# Patient Record
Sex: MALE | Race: WHITE | NOT HISPANIC OR LATINO | URBAN - METROPOLITAN AREA
[De-identification: names, ages, dates, MRNs, and addresses within clinical notes are randomized per-mention and may not be internally consistent; named-entity substitution may affect disease eponyms.]

---

## 2019-01-22 ENCOUNTER — EMERGENCY (EMERGENCY)
Facility: HOSPITAL | Age: 44
LOS: 1 days | Discharge: OTHER INSTITUTION | End: 2019-01-22
Attending: EMERGENCY MEDICINE
Payer: MEDICARE

## 2019-01-22 VITALS
RESPIRATION RATE: 20 BRPM | OXYGEN SATURATION: 97 % | TEMPERATURE: 98 F | SYSTOLIC BLOOD PRESSURE: 162 MMHG | WEIGHT: 147.05 LBS | HEART RATE: 100 BPM | DIASTOLIC BLOOD PRESSURE: 90 MMHG | HEIGHT: 67 IN

## 2019-01-22 DIAGNOSIS — F20.0 PARANOID SCHIZOPHRENIA: ICD-10-CM

## 2019-01-22 DIAGNOSIS — F12.10 CANNABIS ABUSE, UNCOMPLICATED: ICD-10-CM

## 2019-01-22 DIAGNOSIS — F10.10 ALCOHOL ABUSE, UNCOMPLICATED: ICD-10-CM

## 2019-01-22 DIAGNOSIS — F14.10 COCAINE ABUSE, UNCOMPLICATED: ICD-10-CM

## 2019-01-22 LAB
ALBUMIN SERPL ELPH-MCNC: 4.6 G/DL — SIGNIFICANT CHANGE UP (ref 3.3–5.2)
ALP SERPL-CCNC: 71 U/L — SIGNIFICANT CHANGE UP (ref 40–120)
ALT FLD-CCNC: 50 U/L — HIGH
AMPHET UR-MCNC: NEGATIVE — SIGNIFICANT CHANGE UP
ANION GAP SERPL CALC-SCNC: 13 MMOL/L — SIGNIFICANT CHANGE UP (ref 5–17)
AST SERPL-CCNC: 63 U/L — HIGH
BARBITURATES UR SCN-MCNC: NEGATIVE — SIGNIFICANT CHANGE UP
BASOPHILS # BLD AUTO: 0 K/UL — SIGNIFICANT CHANGE UP (ref 0–0.2)
BASOPHILS NFR BLD AUTO: 0.4 % — SIGNIFICANT CHANGE UP (ref 0–2)
BENZODIAZ UR-MCNC: NEGATIVE — SIGNIFICANT CHANGE UP
BILIRUB SERPL-MCNC: 0.5 MG/DL — SIGNIFICANT CHANGE UP (ref 0.4–2)
BUN SERPL-MCNC: 18 MG/DL — SIGNIFICANT CHANGE UP (ref 8–20)
CALCIUM SERPL-MCNC: 9.7 MG/DL — SIGNIFICANT CHANGE UP (ref 8.6–10.2)
CHLORIDE SERPL-SCNC: 105 MMOL/L — SIGNIFICANT CHANGE UP (ref 98–107)
CO2 SERPL-SCNC: 26 MMOL/L — SIGNIFICANT CHANGE UP (ref 22–29)
COCAINE METAB.OTHER UR-MCNC: NEGATIVE — SIGNIFICANT CHANGE UP
CREAT SERPL-MCNC: 0.95 MG/DL — SIGNIFICANT CHANGE UP (ref 0.5–1.3)
EOSINOPHIL # BLD AUTO: 0 K/UL — SIGNIFICANT CHANGE UP (ref 0–0.5)
EOSINOPHIL NFR BLD AUTO: 0.4 % — SIGNIFICANT CHANGE UP (ref 0–5)
ETHANOL SERPL-MCNC: <10 MG/DL — SIGNIFICANT CHANGE UP
GLUCOSE SERPL-MCNC: 53 MG/DL — LOW (ref 70–115)
HCT VFR BLD CALC: 45.3 % — SIGNIFICANT CHANGE UP (ref 42–52)
HGB BLD-MCNC: 15.4 G/DL — SIGNIFICANT CHANGE UP (ref 14–18)
LYMPHOCYTES # BLD AUTO: 1.3 K/UL — SIGNIFICANT CHANGE UP (ref 1–4.8)
LYMPHOCYTES # BLD AUTO: 13.3 % — LOW (ref 20–55)
MCHC RBC-ENTMCNC: 31.3 PG — HIGH (ref 27–31)
MCHC RBC-ENTMCNC: 34 G/DL — SIGNIFICANT CHANGE UP (ref 32–36)
MCV RBC AUTO: 92.1 FL — SIGNIFICANT CHANGE UP (ref 80–94)
METHADONE UR-MCNC: NEGATIVE — SIGNIFICANT CHANGE UP
MONOCYTES # BLD AUTO: 0.7 K/UL — SIGNIFICANT CHANGE UP (ref 0–0.8)
MONOCYTES NFR BLD AUTO: 6.9 % — SIGNIFICANT CHANGE UP (ref 3–10)
NEUTROPHILS # BLD AUTO: 7.6 K/UL — SIGNIFICANT CHANGE UP (ref 1.8–8)
NEUTROPHILS NFR BLD AUTO: 78.8 % — HIGH (ref 37–73)
OPIATES UR-MCNC: NEGATIVE — SIGNIFICANT CHANGE UP
PCP SPEC-MCNC: SIGNIFICANT CHANGE UP
PCP UR-MCNC: NEGATIVE — SIGNIFICANT CHANGE UP
PLATELET # BLD AUTO: 260 K/UL — SIGNIFICANT CHANGE UP (ref 150–400)
POTASSIUM SERPL-MCNC: 4.4 MMOL/L — SIGNIFICANT CHANGE UP (ref 3.5–5.3)
POTASSIUM SERPL-SCNC: 4.4 MMOL/L — SIGNIFICANT CHANGE UP (ref 3.5–5.3)
PROT SERPL-MCNC: 7.4 G/DL — SIGNIFICANT CHANGE UP (ref 6.6–8.7)
RBC # BLD: 4.92 M/UL — SIGNIFICANT CHANGE UP (ref 4.6–6.2)
RBC # FLD: 13.6 % — SIGNIFICANT CHANGE UP (ref 11–15.6)
SODIUM SERPL-SCNC: 144 MMOL/L — SIGNIFICANT CHANGE UP (ref 135–145)
THC UR QL: POSITIVE
WBC # BLD: 9.6 K/UL — SIGNIFICANT CHANGE UP (ref 4.8–10.8)
WBC # FLD AUTO: 9.6 K/UL — SIGNIFICANT CHANGE UP (ref 4.8–10.8)

## 2019-01-22 PROCEDURE — 99285 EMERGENCY DEPT VISIT HI MDM: CPT

## 2019-01-22 PROCEDURE — 93010 ELECTROCARDIOGRAM REPORT: CPT

## 2019-01-22 PROCEDURE — 90792 PSYCH DIAG EVAL W/MED SRVCS: CPT

## 2019-01-22 RX ADMIN — Medication 2 MILLIGRAM(S): at 13:36

## 2019-01-22 NOTE — ED BEHAVIORAL HEALTH ASSESSMENT NOTE - OTHER PAST PSYCHIATRIC HISTORY (INCLUDE DETAILS REGARDING ONSET, COURSE OF ILLNESS, INPATIENT/OUTPATIENT TREATMENT)
h/o over 40 inpatient psychiatric hospitalizations for schizophrenia.  Course is complicated by substance use. Currently followed by [facility] ACT team.

## 2019-01-22 NOTE — ED ADULT NURSE REASSESSMENT NOTE - CONDITION
unchanged/Pt is loud, Making irrational statements. Tells staff he loves  them. Pt is singing, jumping, and swing his arms. Is concerned about his money stating " I want to find a pot of gold to bring me inner peace" No auditory hallucinations observed. Poor impulse control
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
## 2019-01-22 NOTE — ED PROVIDER NOTE - PROGRESS NOTE DETAILS
As per sign-out from Dr. Blevins, patient is medically cleared and awaiting inpatient psych placement for schizophrenia treatment.

## 2019-01-22 NOTE — ED PROVIDER NOTE - CARE PLAN
Principal Discharge DX:	Paranoid schizophrenia  Secondary Diagnosis:	Cocaine abuse  Secondary Diagnosis:	ETOH abuse

## 2019-01-22 NOTE — ED BEHAVIORAL HEALTH NOTE - BEHAVIORAL HEALTH NOTE
SW Note: pt requires inpatient admission for safety and stability. 9.37 completed and on chart. SW called [facility], [facility], [facility], [facility], [facility], [facility], [facility], [facility], [facility] and [facility] and there are no adult beds available at this time.  SW to follow for transfer when appropriate bed is available. SW contacted by pt mother Judy Sainto H: [phone] C:[phone] (note that other numbers in reg are not correct) pt had resided with her at [address] [place] [address] in [facility] but pt can not return to his housing per mother - too many disruptions, etc.  pt mother feels that pt should attend inpatient rehab upon DC from inpatient psych. SW informed family that it was unclear where pt would go as there are no adult male beds available just now - but mother said that [facility] too far. Prefer [facility], [facility] or, ideally [facility] because she visits another family member over there in [place]. Explained that SW would try to get requested facilities - but not able to promise. pt endorsed to day team.

## 2019-01-22 NOTE — ED BEHAVIORAL HEALTH ASSESSMENT NOTE - RISK ASSESSMENT
high-Given disorganized, erratic behavior, impulsivity, h/o aggression, poor insight and poor judgment.

## 2019-01-22 NOTE — ED ADULT NURSE REASSESSMENT NOTE - CONDITION
Pt sleeping soundly. No distress noted. Dinner held until Pt awakes. Breathing soft and even. No distress noted/unchanged
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
## 2019-01-22 NOTE — ED PROVIDER NOTE - OBJECTIVE STATEMENT
The patient is a 43 year old male presents with history of schizophrenia complaining of hearing voices. No HA, No CP, No SOB, No Abd pain, No motor No sensory loss
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
## 2019-01-22 NOTE — ED PROVIDER NOTE - MEDICAL DECISION MAKING DETAILS
The patient presents with paranoid schizophrenia attack and will be transferred to inpatient psychiatry

## 2019-01-22 NOTE — ED ADULT NURSE REASSESSMENT NOTE - CONDITION
Pt continues to escalate and demands his money. Easily deescalated when spoken to about rules of unit. Pt is extremely paranoid and experiencing flight of ideas . thought process is impaired./unchanged

## 2019-01-22 NOTE — ED BEHAVIORAL HEALTH ASSESSMENT NOTE - DESCRIPTION
Patient was pacing, exhibited pressured speech Patient was pacing, exhibited pressured and disorganized speech, intense affect.  He was not violent or physically aggressive towards staff. Patient was given Ativan 2 mg IM x 1 dose voluntarily secondary to psychomotor agitation. Denies Resides intermittently with mother. Has been housed by [facility] last two weeks Patient was pacing, exhibited pressured and disorganized speech, intense affect.  He was not violent or physically aggressive towards staff. Patient was given Ativan 2 mg IM x 1 dose voluntarily secondary to psychomotor agitation. Patient AST: 63, ALT:50, THC: positive, UDS is otherwise unremarkable, BAL<10.    Vital Signs Last 24 Hrs  T(C): 36.8 (22 Jan 2019 13:07), Max: 36.8 (22 Jan 2019 13:07)  T(F): 98.2 (22 Jan 2019 13:07), Max: 98.2 (22 Jan 2019 13:07)  HR: 100 (22 Jan 2019 13:07) (100 - 100)  BP: 162/90 (22 Jan 2019 13:07) (162/90 - 162/90)  BP(mean): --  RR: 20 (22 Jan 2019 13:07) (20 - 20)  SpO2: 97% (22 Jan 2019 13:07) (97% - 97%)

## 2019-01-22 NOTE — ED ADULT NURSE NOTE - NSIMPLEMENTINTERV_GEN_ALL_ED
Implemented All Universal Safety Interventions:  Gar[place]di to call system. Call bell, personal items and telephone within reach. Instruct patient to call for assistance. Room bathroom lighting operational. Non-slip footwear when patient is off stretcher. Physically safe environment: no spills, clutter or unnecessary equipment. Stretcher in lowest position, wheels locked, appropriate side rails in place.

## 2019-01-23 VITALS
HEART RATE: 97 BPM | DIASTOLIC BLOOD PRESSURE: 85 MMHG | TEMPERATURE: 98 F | RESPIRATION RATE: 18 BRPM | OXYGEN SATURATION: 98 % | SYSTOLIC BLOOD PRESSURE: 135 MMHG

## 2019-01-23 PROCEDURE — 99285 EMERGENCY DEPT VISIT HI MDM: CPT | Mod: 25

## 2019-01-23 PROCEDURE — 85027 COMPLETE CBC AUTOMATED: CPT

## 2019-01-23 PROCEDURE — 93005 ELECTROCARDIOGRAM TRACING: CPT

## 2019-01-23 PROCEDURE — 80307 DRUG TEST PRSMV CHEM ANLYZR: CPT

## 2019-01-23 PROCEDURE — 80053 COMPREHEN METABOLIC PANEL: CPT

## 2019-01-23 PROCEDURE — 36415 COLL VENOUS BLD VENIPUNCTURE: CPT

## 2019-01-23 PROCEDURE — 99213 OFFICE O/P EST LOW 20 MIN: CPT

## 2019-01-23 PROCEDURE — 96372 THER/PROPH/DIAG INJ SC/IM: CPT

## 2019-01-23 RX ORDER — FLUPHENAZINE HYDROCHLORIDE 1 MG/1
5 TABLET, FILM COATED ORAL DAILY
Qty: 0 | Refills: 0 | Status: DISCONTINUED | OUTPATIENT
Start: 2019-01-23 | End: 2019-01-27

## 2019-01-23 RX ADMIN — FLUPHENAZINE HYDROCHLORIDE 5 MILLIGRAM(S): 1 TABLET, FILM COATED ORAL at 10:15

## 2019-01-23 RX ADMIN — Medication 1 MILLIGRAM(S): at 10:15

## 2019-01-23 NOTE — ED ADULT NURSE REASSESSMENT NOTE - COMFORT CARE
meal provided/pt threw in the garbage
meal provided/ambulated to bathroom/plan of care explained/po fluids offered/wait time explained
po fluids offered/plan of care explained/ambulated to bathroom/meal provided/wait time explained/darkened lights
dinner held
meal provided/plan of care explained
plan of care explained
po fluids offered/cookies and gingerale given
po fluids offered/snack provided
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
## 2019-01-23 NOTE — ED BEHAVIORAL HEALTH NOTE - BEHAVIORAL HEALTH NOTE
PROGRESS NOTE: 01-23-19 @ 10:31  	  • Reason for Ongoing Consultation: 	on hold for inpatient bed    ID: [id number] Male with HEALTH ISSUES - PROBLEM Dx:  ETOH abuse  Cannabis abuse  Cocaine abuse  Paranoid schizophrenia          INTERVAL DATA:   • Interval Chief Complaint: Pills destroy the brain  • Interval History: pacing delusional easily agitated no insight paranoid themes     REVIEW OF SYSTEMS:   • Constitutional Symptoms	No complaints  • Eyes	No complaints  • Ears / Nose / Throat / Mouth	No complaints  • Cardiovascular	No complaints  • Respiratory	No complaints  • Gastrointestinal	No complaints  • Genitourinary	No complaints  • Musculoskeletal	No complaints  • Skin	No complaints  • Neurological	No complaints  • Psychiatric (see HPI)	See HPI  • Endocrine	No complaints  • Hematologic / Lymphatic	No complaints  • Allergic / Immunologic	No complaints    REVIEW OF VITALS/LABS/IMAGING/INVESTIGATIONS:   • Vital signs reviewed: Yes  • Vital Signs:	    T(C): 36.4 (01-23-19 @ 07:43), Max: 36.9 (01-22-19 @ 19:15)  HR: 84 (01-23-19 @ 07:43) (80 - 100)  BP: 129/79 (01-23-19 @ 07:43) (108/64 - 162/90)  RR: 18 (01-23-19 @ 07:43) (18 - 20)  SpO2: 98% (01-23-19 @ 07:43) (97% - 99%)    • Available labs reviewed: Yes  • Available Lab Results:                           15.4   9.6   )-----------( 260      ( 22 Jan 2019 13:50 )             45.3     01-22    144  |  105  |  18.0  ----------------------------<  53<L>  4.4   |  26.0  |  0.95    Ca    9.7      22 Jan 2019 13:50    TPro  7.4  /  Alb  4.6  /  TBili  0.5  /  DBili  x   /  AST  63<H>  /  ALT  50<H>  /  AlkPhos  71  01-22    LIVER FUNCTIONS - ( 22 Jan 2019 13:50 )  Alb: 4.6 g/dL / Pro: 7.4 g/dL / ALK PHOS: 71 U/L / ALT: 50 U/L / AST: 63 U/L / GGT: x                   MEDICATIONS:      PRN Medications:  • PRN Medications since last evaluation	Ativan 1 mg for anxiety  • PRN Details	    Current Medications:   fluphenazine 5 milliGRAM(s) Oral daily     Medication Side Effects:  • Medication Side Effects or Adverse Reactions (new or ongoing)	None known    MENTAL STATUS EXAM:   • Level of Consciousness	Alert  • General Appearance	Well developed  • Body Habitus	Well nourished  • Hygiene	poor  • Grooming	poor  • Behavior	Cooperative  • Eye Contact	poor  • Relatedness	poor  • Impulse Control	Normal  • Muscle Tone / Strength	Normal muscle tone/strength  • Abnormal Movements	No abnormal movements  • Gait / Station	Normal gait / station  • Speech Volume	loud  • Speech Rate	Normal  • Speech Spontaneity	Normal  • Speech Articulation	Normal  • Mood	Normal  • Affect Quality	Euthymic  • Affect Range	Full  • Affect Congruence	Congruent  • Thought Process	Linear  • Thought Associations	Normal  • Thought Content	Unremarkable  • Perceptions	No abnormalities  • Oriented to Time	Yes  • Oriented to Place	Yes  • Oriented to Situation	Yes  • Oriented to Person	Yes  • Attention / Concentration	Normal  • Estimated Intelligence	Average  • Recent Memory	Normal  • Remote Memory	Normal  • Fund of Knowledge	Normal  • Language	No abnormalities noted  • Judgment (regarding everyday events)	Fair  • Insight (regarding psychiatric illness)	Fair    SUICIDALITY:   • Suicidality (Interval)	none known    HOMICIDALITY/AGGRESSION:   • Homicidality/Aggression	none known    DIAGNOSIS DSM-V:    Psychiatric Diagnosis (Corresponds to DSM-IV Axis I, II):   HEALTH ISSUES - PROBLEM Dx:  ETOH abuse  Cannabis abuse  Cocaine abuse  Paranoid schizophrenia           Medical Diagnosis (Corresponds to DSM-IV Axis III):  • Axis III	  none    ASSESSMENT OF CURRENT CONDITION:   Summary (include case differential, formulation and patient response to therapy): acute exacerbation of paranoid schizophrenia    Risk Assessment (consider static vs modifiable risk factors and protective factors; comment on level of risk for dangerous behavior): elevated risk of violence    PLAN  prolixin given for psychosis  involuntary admission accepted by [facility]

## 2019-01-23 NOTE — ED BEHAVIORAL HEALTH NOTE - BEHAVIORAL HEALTH NOTE
JULIO Note: Pt was transferred to [facility] for inpt psychiatric tx. 9.37 legals completed. Ambulance arranged with [facility]. The pt gave verbal permission and stated we could notify his mother. Notification made to Abeba JACKSON [phone]. Notified his ACT team, Nav Valdes [phone]. No auth needed, medicare and medicaid.

## 2019-01-23 NOTE — ED ADULT NURSE REASSESSMENT NOTE - STATUS
awaiting transfer, no change
awaiting bed/awaiting transfer, no change
awaiting consult
awaiting consult/awaiting psych consult
awaiting transfer, no change

## 2019-01-23 NOTE — ED ADULT NURSE REASSESSMENT NOTE - GENERAL PATIENT STATE
resting/sleeping/comfortable appearance/cooperative
anxious
anxious/cooperative
comfortable appearance/cooperative/resting/sleeping
comfortable appearance/resting/sleeping
resting/sleeping
smiling/interactive/bizarre behavior/anxious

## 2019-12-01 ENCOUNTER — OUTPATIENT (OUTPATIENT)
Dept: OUTPATIENT SERVICES | Facility: HOSPITAL | Age: 44
LOS: 1 days | End: 2019-12-01
Payer: MEDICARE

## 2019-12-01 PROCEDURE — G9001: CPT

## 2019-12-05 ENCOUNTER — EMERGENCY (EMERGENCY)
Facility: HOSPITAL | Age: 44
LOS: 0 days | Discharge: HOME | End: 2019-12-06
Attending: EMERGENCY MEDICINE
Payer: MEDICARE

## 2019-12-05 VITALS
RESPIRATION RATE: 16 BRPM | DIASTOLIC BLOOD PRESSURE: 93 MMHG | OXYGEN SATURATION: 98 % | HEIGHT: 69 IN | TEMPERATURE: 98 F | SYSTOLIC BLOOD PRESSURE: 129 MMHG | HEART RATE: 98 BPM | WEIGHT: 160.06 LBS

## 2019-12-05 DIAGNOSIS — R10.84 GENERALIZED ABDOMINAL PAIN: ICD-10-CM

## 2019-12-05 DIAGNOSIS — Z79.899 OTHER LONG TERM (CURRENT) DRUG THERAPY: ICD-10-CM

## 2019-12-05 DIAGNOSIS — R05 COUGH: ICD-10-CM

## 2019-12-05 DIAGNOSIS — R10.9 UNSPECIFIED ABDOMINAL PAIN: ICD-10-CM

## 2019-12-05 DIAGNOSIS — F33.9 MAJOR DEPRESSIVE DISORDER, RECURRENT, UNSPECIFIED: ICD-10-CM

## 2019-12-05 DIAGNOSIS — F20.9 SCHIZOPHRENIA, UNSPECIFIED: ICD-10-CM

## 2019-12-05 DIAGNOSIS — R41.82 ALTERED MENTAL STATUS, UNSPECIFIED: ICD-10-CM

## 2019-12-05 PROCEDURE — 80053 COMPREHEN METABOLIC PANEL: CPT

## 2019-12-05 PROCEDURE — 81003 URINALYSIS AUTO W/O SCOPE: CPT

## 2019-12-05 PROCEDURE — 90792 PSYCH DIAG EVAL W/MED SRVCS: CPT

## 2019-12-05 PROCEDURE — 80307 DRUG TEST PRSMV CHEM ANLYZR: CPT

## 2019-12-05 PROCEDURE — 85025 COMPLETE CBC W/AUTO DIFF WBC: CPT

## 2019-12-05 PROCEDURE — 99285 EMERGENCY DEPT VISIT HI MDM: CPT

## 2019-12-05 PROCEDURE — 96374 THER/PROPH/DIAG INJ IV PUSH: CPT

## 2019-12-05 PROCEDURE — 99285 EMERGENCY DEPT VISIT HI MDM: CPT | Mod: 25

## 2019-12-05 PROCEDURE — 36415 COLL VENOUS BLD VENIPUNCTURE: CPT

## 2019-12-05 NOTE — ED ADULT TRIAGE NOTE - CHIEF COMPLAINT QUOTE
Patient comes in with AMS as per PD who called EMS. Patient now complaining of cough and abdominal pain. Patient has hx of schizophrenia as per EMS. Patient A&O x3 at triage. Denies chest pain, sob. Patient comes in with AMS as per PD who called EMS. Patient now complaining of cough and abdominal pain. Patient has hx of schizophrenia as per EMS. Patient appears manic at triage. Denies SI and HI. Patient A&O x3 at triage.

## 2019-12-05 NOTE — ED ADULT NURSE NOTE - CHIEF COMPLAINT QUOTE
Patient comes in with AMS as per PD who called EMS. Patient now complaining of cough and abdominal pain. Patient has hx of schizophrenia as per EMS. Patient A&O x3 at triage. Denies chest pain, sob.

## 2019-12-05 NOTE — ED ADULT NURSE NOTE - OBJECTIVE STATEMENT
pt presents to the ED BIB EMS for AMS from group home. pt unable to recall why EMS was called. reports that nothing is bothering him. pt noted to be disoriented, manic. thoughts arent logical, speaking on a tangent.

## 2019-12-06 VITALS
HEART RATE: 71 BPM | OXYGEN SATURATION: 99 % | SYSTOLIC BLOOD PRESSURE: 124 MMHG | DIASTOLIC BLOOD PRESSURE: 71 MMHG | RESPIRATION RATE: 16 BRPM | TEMPERATURE: 98 F

## 2019-12-06 LAB
ALBUMIN SERPL ELPH-MCNC: 4 G/DL — SIGNIFICANT CHANGE UP (ref 3.3–5)
ALP SERPL-CCNC: 92 U/L — SIGNIFICANT CHANGE UP (ref 40–120)
ALT FLD-CCNC: 34 U/L — SIGNIFICANT CHANGE UP (ref 12–78)
ANION GAP SERPL CALC-SCNC: 8 MMOL/L — SIGNIFICANT CHANGE UP (ref 5–17)
APAP SERPL-MCNC: < 2 UG/ML (ref 10–30)
APPEARANCE UR: CLEAR — SIGNIFICANT CHANGE UP
AST SERPL-CCNC: 36 U/L — SIGNIFICANT CHANGE UP (ref 15–37)
BASOPHILS # BLD AUTO: 0.06 K/UL — SIGNIFICANT CHANGE UP (ref 0–0.2)
BASOPHILS NFR BLD AUTO: 0.8 % — SIGNIFICANT CHANGE UP (ref 0–2)
BILIRUB SERPL-MCNC: 0.4 MG/DL — SIGNIFICANT CHANGE UP (ref 0.2–1.2)
BILIRUB UR-MCNC: NEGATIVE — SIGNIFICANT CHANGE UP
BUN SERPL-MCNC: 17 MG/DL — SIGNIFICANT CHANGE UP (ref 7–23)
CALCIUM SERPL-MCNC: 9.7 MG/DL — SIGNIFICANT CHANGE UP (ref 8.5–10.1)
CHLORIDE SERPL-SCNC: 103 MMOL/L — SIGNIFICANT CHANGE UP (ref 96–108)
CO2 SERPL-SCNC: 26 MMOL/L — SIGNIFICANT CHANGE UP (ref 22–31)
COLOR SPEC: YELLOW — SIGNIFICANT CHANGE UP
CREAT SERPL-MCNC: 1.32 MG/DL — HIGH (ref 0.5–1.3)
DIFF PNL FLD: NEGATIVE — SIGNIFICANT CHANGE UP
EOSINOPHIL # BLD AUTO: 0.07 K/UL — SIGNIFICANT CHANGE UP (ref 0–0.5)
EOSINOPHIL NFR BLD AUTO: 1 % — SIGNIFICANT CHANGE UP (ref 0–6)
ETHANOL SERPL-MCNC: <10 MG/DL — SIGNIFICANT CHANGE UP (ref 0–10)
GLUCOSE SERPL-MCNC: 89 MG/DL — SIGNIFICANT CHANGE UP (ref 70–99)
GLUCOSE UR QL: NEGATIVE MG/DL — SIGNIFICANT CHANGE UP
HCT VFR BLD CALC: 44.9 % — SIGNIFICANT CHANGE UP (ref 39–50)
HGB BLD-MCNC: 15.2 G/DL — SIGNIFICANT CHANGE UP (ref 13–17)
IMM GRANULOCYTES NFR BLD AUTO: 0.3 % — SIGNIFICANT CHANGE UP (ref 0–1.5)
KETONES UR-MCNC: NEGATIVE — SIGNIFICANT CHANGE UP
LEUKOCYTE ESTERASE UR-ACNC: NEGATIVE — SIGNIFICANT CHANGE UP
LYMPHOCYTES # BLD AUTO: 1.28 K/UL — SIGNIFICANT CHANGE UP (ref 1–3.3)
LYMPHOCYTES # BLD AUTO: 17.4 % — SIGNIFICANT CHANGE UP (ref 13–44)
MCHC RBC-ENTMCNC: 30.5 PG — SIGNIFICANT CHANGE UP (ref 27–34)
MCHC RBC-ENTMCNC: 33.9 GM/DL — SIGNIFICANT CHANGE UP (ref 32–36)
MCV RBC AUTO: 90 FL — SIGNIFICANT CHANGE UP (ref 80–100)
MONOCYTES # BLD AUTO: 0.98 K/UL — HIGH (ref 0–0.9)
MONOCYTES NFR BLD AUTO: 13.4 % — SIGNIFICANT CHANGE UP (ref 2–14)
NEUTROPHILS # BLD AUTO: 4.93 K/UL — SIGNIFICANT CHANGE UP (ref 1.8–7.4)
NEUTROPHILS NFR BLD AUTO: 67.1 % — SIGNIFICANT CHANGE UP (ref 43–77)
NITRITE UR-MCNC: NEGATIVE — SIGNIFICANT CHANGE UP
PCP SPEC-MCNC: SIGNIFICANT CHANGE UP
PH UR: 7 — SIGNIFICANT CHANGE UP (ref 5–8)
PLATELET # BLD AUTO: 244 K/UL — SIGNIFICANT CHANGE UP (ref 150–400)
POTASSIUM SERPL-MCNC: 4.3 MMOL/L — SIGNIFICANT CHANGE UP (ref 3.5–5.3)
POTASSIUM SERPL-SCNC: 4.3 MMOL/L — SIGNIFICANT CHANGE UP (ref 3.5–5.3)
PROT SERPL-MCNC: 7.4 GM/DL — SIGNIFICANT CHANGE UP (ref 6–8.3)
PROT UR-MCNC: NEGATIVE MG/DL — SIGNIFICANT CHANGE UP
RBC # BLD: 4.99 M/UL — SIGNIFICANT CHANGE UP (ref 4.2–5.8)
RBC # FLD: 13.2 % — SIGNIFICANT CHANGE UP (ref 10.3–14.5)
SALICYLATES SERPL-MCNC: <1.7 MG/DL — LOW (ref 2.8–20)
SODIUM SERPL-SCNC: 137 MMOL/L — SIGNIFICANT CHANGE UP (ref 135–145)
SP GR SPEC: 1.01 — SIGNIFICANT CHANGE UP (ref 1.01–1.02)
UROBILINOGEN FLD QL: NEGATIVE MG/DL — SIGNIFICANT CHANGE UP
WBC # BLD: 7.34 K/UL — SIGNIFICANT CHANGE UP (ref 3.8–10.5)
WBC # FLD AUTO: 7.34 K/UL — SIGNIFICANT CHANGE UP (ref 3.8–10.5)

## 2019-12-06 PROCEDURE — 90792 PSYCH DIAG EVAL W/MED SRVCS: CPT | Mod: GT

## 2019-12-06 RX ADMIN — Medication 2 MILLIGRAM(S): at 00:47

## 2019-12-06 NOTE — ED BEHAVIORAL HEALTH ASSESSMENT NOTE - CURRENT MEDICATION
Abilify Maintaina 400 mg IM X 4 weeks (next due on 1/24/18) unclear what current medications     previously on abilify, invega, prolixin, risperdal Risperidone 50 RENO

## 2019-12-06 NOTE — ED BEHAVIORAL HEALTH ASSESSMENT NOTE - PSYCHIATRIC ISSUES AND PLAN (INCLUDE STANDING AND PRN MEDICATION)
Treatment team to evaluate and make recommendations unclear what current psychiatric medications are, most recent meds have been RENO, haldol 5 mg and ativan q8h prn for severe agitation

## 2019-12-06 NOTE — ED BEHAVIORAL HEALTH ASSESSMENT NOTE - OTHER
looking for area beds [facility] external billing EMS unclear at this time roommates unable to asseess did not assess slurred some paranoia unable to assess

## 2019-12-06 NOTE — ED PROVIDER NOTE - PATIENT PORTAL LINK FT
You can access the FollowMyHealth Patient Portal offered by [facility] by registering at the following website: [email or web address]. By joining [employer]’s FollowMyHealth portal, you will also be able to view your health information using other applications (apps) compatible with our system.
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
## 2019-12-06 NOTE — ED PROVIDER NOTE - PROGRESS NOTE DETAILS
pt refusing any testing but when offered ativan after IV placement and labs, pt agreed.   concern for possible manipulation of system pt refusing CTs.   denies any complaints.   case d/w Dr Medina (telepsych) and will evaluate pt pt evaluated by telepsych and recommend reeval in AM  I spoke to pt and refusing meds now psych is evaluating at the bedside pt was cleared by psych contractewd to safety has follow up as outpatient   Return to the ER immediately for any worsening symptoms, concerns, chest pain, fevers, shortness of breath, vomiting, abdominal pain, rashes, neck pain, back pain, numbness, paresthesias, pain or any difficulties at all.  Please follow up with your own private physician or our medical clinic at [phone] in the next 2-3 days.  Find a doctor at [phone].  Copies of your tests were provided to you for follow-up.  You must address all your findings with your doctor.
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
## 2019-12-06 NOTE — ED ADULT NURSE REASSESSMENT NOTE - NS ED NURSE REASSESS COMMENT FT1
1:1 initiated @0600. pt not currently agitated or restless. awoken for vitals and continues to have confused thoughts. Pt in no acute distress. will ctm
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
## 2019-12-06 NOTE — ED ADULT NURSE REASSESSMENT NOTE - NS ED NURSE REASSESS COMMENT FT1
pt refusing blood glucose, EKG at this time. pt delusional, stating he does not want to be bothered at this time.

## 2019-12-06 NOTE — ED ADULT NURSE REASSESSMENT NOTE - NS ED NURSE REASSESS COMMENT FT1
pt refused CT. MD mace aware. report received from TWYLA SCHMIDT. pt refused CT despite explanations on necessity. patient does not endorse abd pain/nausea/vomiting at this time. MD mace aware.

## 2019-12-06 NOTE — ED ADULT NURSE REASSESSMENT NOTE - NS ED NURSE REASSESS COMMENT FT1
pt belongings returned to him, medicaid cab in place [facility] in [place], per Dr sarkar request. pt discharged calm cooeprative in no acute distress

## 2019-12-06 NOTE — ED BEHAVIORAL HEALTH ASSESSMENT NOTE - ACTIVATING EVENTS/STRESSORS
Non-compliant or not receiving treatment Non-compliant or not receiving treatment/Pending incarceration or homelessness None known

## 2019-12-06 NOTE — ED PROVIDER NOTE - CARE PLAN
Principal Discharge DX:	Generalized abdominal pain  Secondary Diagnosis:	Recurrent major depressive disorder, remission status unspecified

## 2019-12-06 NOTE — ED BEHAVIORAL HEALTH ASSESSMENT NOTE - DESCRIPTION
Denies Resides intermittently with mother. Has been housed by [facility] last two weeks Records checked/sent to [facility] – with data: [facility] (past BH/medical visits), [facility]    Records checked- no data: [facility], [facility], [facility], [facility], [facility] tab, [facility], [facility], [facility], [facility], [facility], [facility], [facility], [facility], [facility] (medical visits), webcrims, [email or web address], [facility], [facility] ED Visits, google search unable to assess Records checked/sent to [facility] – with data: [facility] (past BH/medical visits), [facility]    Records checked- no data: [facility], [facility], [facility], [facility], [facility] tab, [facility], [facility], [facility], [facility], [facility], [facility], [facility], [facility], [facility] (medical visits), webcrims, nysdoccslookup, [facility]iat[facility], [facility] ED Visits, google search      ED Course: Pt was in ED ~4 hours prior to telepsychiatry consult which was requested at 2:21  and started at 3:12.  Per RN pt. arrived by police/EMS for AMS.  Pt. reluctant to engage but complied with triage process, provided routine labs and urine willingly, allowed gowning and security wanding without incident. Pt. irritable and requested medication for sleep and rest, was given Ativan 2mg IV push at 00:36 with calming effect. Pt. appears manic to and disorganized to RN, but denies psychiatric symptoms and SI/HI AH/VH. Pt’s thoughts reportedly illogical, tangential and disorganized.   Pt’s mood described as anxious, with congruent affect.  Pt. has not had any visitors in ED. As per HPI

## 2019-12-06 NOTE — ED BEHAVIORAL HEALTH ASSESSMENT NOTE - RISK ASSESSMENT
high-Given disorganized, erratic behavior, impulsivity, h/o aggression, poor insight and poor judgment. Unable to determine Suicide Risk LOW RISK Low Acute Suicide Risk

## 2019-12-11 DIAGNOSIS — Z71.89 OTHER SPECIFIED COUNSELING: ICD-10-CM

## 2020-01-19 ENCOUNTER — EMERGENCY (EMERGENCY)
Facility: HOSPITAL | Age: 45
LOS: 1 days | Discharge: HOME | End: 2020-01-19
Attending: EMERGENCY MEDICINE
Payer: MEDICARE

## 2020-01-19 VITALS
WEIGHT: 154.98 LBS | OXYGEN SATURATION: 100 % | SYSTOLIC BLOOD PRESSURE: 136 MMHG | HEART RATE: 115 BPM | DIASTOLIC BLOOD PRESSURE: 88 MMHG | TEMPERATURE: 99 F | RESPIRATION RATE: 18 BRPM | HEIGHT: 68 IN

## 2020-01-19 DIAGNOSIS — R69 ILLNESS, UNSPECIFIED: ICD-10-CM

## 2020-01-19 DIAGNOSIS — F14.10 COCAINE ABUSE, UNCOMPLICATED: ICD-10-CM

## 2020-01-19 DIAGNOSIS — F25.0 SCHIZOAFFECTIVE DISORDER, BIPOLAR TYPE: ICD-10-CM

## 2020-01-19 LAB
ALBUMIN SERPL ELPH-MCNC: 4.6 G/DL — SIGNIFICANT CHANGE UP (ref 3.3–5.2)
ALP SERPL-CCNC: 74 U/L — SIGNIFICANT CHANGE UP (ref 40–120)
ALT FLD-CCNC: 20 U/L — SIGNIFICANT CHANGE UP
AMPHET UR-MCNC: NEGATIVE — SIGNIFICANT CHANGE UP
ANION GAP SERPL CALC-SCNC: 16 MMOL/L — SIGNIFICANT CHANGE UP (ref 5–17)
APPEARANCE UR: CLEAR — SIGNIFICANT CHANGE UP
AST SERPL-CCNC: 40 U/L — HIGH
BACTERIA # UR AUTO: ABNORMAL
BARBITURATES UR SCN-MCNC: NEGATIVE — SIGNIFICANT CHANGE UP
BASOPHILS # BLD AUTO: 0.04 K/UL — SIGNIFICANT CHANGE UP (ref 0–0.2)
BASOPHILS NFR BLD AUTO: 0.3 % — SIGNIFICANT CHANGE UP (ref 0–2)
BENZODIAZ UR-MCNC: NEGATIVE — SIGNIFICANT CHANGE UP
BILIRUB SERPL-MCNC: 0.8 MG/DL — SIGNIFICANT CHANGE UP (ref 0.4–2)
BILIRUB UR-MCNC: NEGATIVE — SIGNIFICANT CHANGE UP
BUN SERPL-MCNC: 22 MG/DL — HIGH (ref 8–20)
CALCIUM SERPL-MCNC: 9.6 MG/DL — SIGNIFICANT CHANGE UP (ref 8.6–10.2)
CHLORIDE SERPL-SCNC: 102 MMOL/L — SIGNIFICANT CHANGE UP (ref 98–107)
CO2 SERPL-SCNC: 21 MMOL/L — LOW (ref 22–29)
COCAINE METAB.OTHER UR-MCNC: POSITIVE
COLOR SPEC: YELLOW — SIGNIFICANT CHANGE UP
CREAT SERPL-MCNC: 1.19 MG/DL — SIGNIFICANT CHANGE UP (ref 0.5–1.3)
DIFF PNL FLD: NEGATIVE — SIGNIFICANT CHANGE UP
EOSINOPHIL # BLD AUTO: 0 K/UL — SIGNIFICANT CHANGE UP (ref 0–0.5)
EOSINOPHIL NFR BLD AUTO: 0 % — SIGNIFICANT CHANGE UP (ref 0–6)
EPI CELLS # UR: SIGNIFICANT CHANGE UP
ETHANOL SERPL-MCNC: <10 MG/DL — SIGNIFICANT CHANGE UP
GLUCOSE SERPL-MCNC: 107 MG/DL — SIGNIFICANT CHANGE UP (ref 70–115)
GLUCOSE UR QL: NEGATIVE MG/DL — SIGNIFICANT CHANGE UP
HCT VFR BLD CALC: 42.7 % — SIGNIFICANT CHANGE UP (ref 39–50)
HGB BLD-MCNC: 14.6 G/DL — SIGNIFICANT CHANGE UP (ref 13–17)
IMM GRANULOCYTES NFR BLD AUTO: 0.3 % — SIGNIFICANT CHANGE UP (ref 0–1.5)
KETONES UR-MCNC: ABNORMAL
LEUKOCYTE ESTERASE UR-ACNC: NEGATIVE — SIGNIFICANT CHANGE UP
LYMPHOCYTES # BLD AUTO: 1.46 K/UL — SIGNIFICANT CHANGE UP (ref 1–3.3)
LYMPHOCYTES # BLD AUTO: 12.5 % — LOW (ref 13–44)
MCHC RBC-ENTMCNC: 30.9 PG — SIGNIFICANT CHANGE UP (ref 27–34)
MCHC RBC-ENTMCNC: 34.2 GM/DL — SIGNIFICANT CHANGE UP (ref 32–36)
MCV RBC AUTO: 90.3 FL — SIGNIFICANT CHANGE UP (ref 80–100)
METHADONE UR-MCNC: NEGATIVE — SIGNIFICANT CHANGE UP
MONOCYTES # BLD AUTO: 0.95 K/UL — HIGH (ref 0–0.9)
MONOCYTES NFR BLD AUTO: 8.1 % — SIGNIFICANT CHANGE UP (ref 2–14)
NEUTROPHILS # BLD AUTO: 9.24 K/UL — HIGH (ref 1.8–7.4)
NEUTROPHILS NFR BLD AUTO: 78.8 % — HIGH (ref 43–77)
NITRITE UR-MCNC: NEGATIVE — SIGNIFICANT CHANGE UP
OPIATES UR-MCNC: NEGATIVE — SIGNIFICANT CHANGE UP
PCP SPEC-MCNC: SIGNIFICANT CHANGE UP
PCP UR-MCNC: NEGATIVE — SIGNIFICANT CHANGE UP
PH UR: 6.5 — SIGNIFICANT CHANGE UP (ref 5–8)
PLATELET # BLD AUTO: 243 K/UL — SIGNIFICANT CHANGE UP (ref 150–400)
POTASSIUM SERPL-MCNC: 4 MMOL/L — SIGNIFICANT CHANGE UP (ref 3.5–5.3)
POTASSIUM SERPL-SCNC: 4 MMOL/L — SIGNIFICANT CHANGE UP (ref 3.5–5.3)
PROT SERPL-MCNC: 7.6 G/DL — SIGNIFICANT CHANGE UP (ref 6.6–8.7)
PROT UR-MCNC: 30 MG/DL
RBC # BLD: 4.73 M/UL — SIGNIFICANT CHANGE UP (ref 4.2–5.8)
RBC # FLD: 13.8 % — SIGNIFICANT CHANGE UP (ref 10.3–14.5)
RBC CASTS # UR COMP ASSIST: SIGNIFICANT CHANGE UP /HPF (ref 0–4)
SODIUM SERPL-SCNC: 139 MMOL/L — SIGNIFICANT CHANGE UP (ref 135–145)
SP GR SPEC: 1.02 — SIGNIFICANT CHANGE UP (ref 1.01–1.02)
THC UR QL: POSITIVE
UROBILINOGEN FLD QL: 1 MG/DL
WBC # BLD: 11.72 K/UL — HIGH (ref 3.8–10.5)
WBC # FLD AUTO: 11.72 K/UL — HIGH (ref 3.8–10.5)
WBC UR QL: SIGNIFICANT CHANGE UP

## 2020-01-19 PROCEDURE — 93010 ELECTROCARDIOGRAM REPORT: CPT

## 2020-01-19 PROCEDURE — 99284 EMERGENCY DEPT VISIT MOD MDM: CPT

## 2020-01-19 PROCEDURE — 90792 PSYCH DIAG EVAL W/MED SRVCS: CPT

## 2020-01-19 NOTE — ED PROVIDER NOTE - PATIENT PORTAL LINK FT
You can access the FollowMyHealth Patient Portal offered by [facility] by registering at the following website: [email or web address]. By joining [facility]’s FollowMyHealth portal, you will also be able to view your health information using other applications (apps) compatible with our system.

## 2020-01-19 NOTE — ED BEHAVIORAL HEALTH ASSESSMENT NOTE - PRIMARY DX
Schizoaffective disorder, bipolar type without good prognostic features Deferred condition on axis II

## 2020-01-19 NOTE — ED BEHAVIORAL HEALTH ASSESSMENT NOTE - OTHER
patient lives in a house but will not identify where that is or who he lives with residents not cooperative pending bed availability

## 2020-01-19 NOTE — ED ADULT NURSE NOTE - NSIMPLEMENTINTERV_GEN_ALL_ED
Implemented All Fall Risk Interventions:  [place] to call system. Call bell, personal items and telephone within reach. Instruct patient to call for assistance. Room bathroom lighting operational. Non-slip footwear when patient is off stretcher. Physically safe environment: no spills, clutter or unnecessary equipment. Stretcher in lowest position, wheels locked, appropriate side rails in place. Provide visual cue, wrist band, yellow gown, etc. Monitor gait and stability. Monitor for mental status changes and reorient to person, place, and time. Review medications for side effects contributing to fall risk. Reinforce activity limits and safety measures with patient and family. Implemented All Universal Safety Interventions:  [place] to call system. Call bell, personal items and telephone within reach. Instruct patient to call for assistance. Room bathroom lighting operational. Non-slip footwear when patient is off stretcher. Physically safe environment: no spills, clutter or unnecessary equipment. Stretcher in lowest position, wheels locked, appropriate side rails in place.

## 2020-01-19 NOTE — ED BEHAVIORAL HEALTH NOTE - BEHAVIORAL HEALTH NOTE
JULIONote: pt seen by psych NP(John) pt found to benefit from inpatient hospitalization .Pt was d/c from [facility] recently (no beds available today ), no beds available at [facility] either.  Pt's family requesting to be informed about accepting hospital (no [facility]). SW will continue efforts to find bed.

## 2020-01-19 NOTE — ED BEHAVIORAL HEALTH ASSESSMENT NOTE - SUMMARY
44 year old male recently discharged from [facility] 5 days ago and with chronic hx of schizoaffective d/o, substance abuse and medication non-compliance  brought to the ER after telling his mother he would kill himself. He remains psychotic, with BRAYAN, continued passive SI "if I have to be on psychiatric medications". Easily agitated, unhappy with his life.

## 2020-01-19 NOTE — ED BEHAVIORAL HEALTH ASSESSMENT NOTE - DESCRIPTION
Admit to psychiatry to any bed after medical clearance . none known Patient lives in supportive housing

## 2020-01-19 NOTE — ED ADULT TRIAGE NOTE - CHIEF COMPLAINT QUOTE
Patient arrived via EMS, awake alert, disoriented, bizarre behavior, bizarre statements, breathing unlabored.  Patient admits to doing crack cocaine, and drinking alcohol.   Patient had 6 ativan tablets found in pants pocket.  Patient admits to SI and " self sacrifice"  Patient re-directed in times.  patient undressed placed in yellow gown.

## 2020-01-19 NOTE — ED PROVIDER NOTE - PROGRESS NOTE DETAILS
As per sign-out from Dr. Ceron, patient whom is medically cleared but requires inpatient psych admission for management of schizophrenia. Patient has remained stable. Still awaiting inpatient psych bed placement. Patient signed out to Dr. Blevins. Patient stable and received in sign-out from Dr. Blevins pending placement. Patient remained stable during the day. Placement still pending. cleared by dr javed: will follow up with ACT team. No acute medical conditions.  Will discharge.

## 2020-01-19 NOTE — ED PROVIDER NOTE - OBJECTIVE STATEMENT
45 yo male with SI. Pt has dx schizophrenia, dx age 17 yo. Non compliant on meds. States he is SI and "Will sacrifice himself before dying from drugs" Did use crack last night. Just d/c after a month hospitalization from [facility]. Went to his mother's today and relative found him to be "hyped up" as per mother. Pt admitted crack use to family. Also admitted use during ER interview.  No medical complaints.  Med hx neg  Psych hx Scizophrenia  Soc Hx drug use
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
## 2020-01-19 NOTE — ED BEHAVIORAL HEALTH ASSESSMENT NOTE - RISK ASSESSMENT
Risk factors include chronic mental illness, substance abuse which is active, his persistent refusal to take medications when at home, impulsivity problem. and when he begins to talk about killing himself.  Protective factors is his mothers continued support and that he goes to see. Moderate Acute Suicide Risk

## 2020-01-19 NOTE — ED PROVIDER NOTE - CONSTITUTIONAL, MLM
normal... Well appearing, awake, alert, oriented to person, place, time/situation and in + emotional  distress.

## 2020-01-19 NOTE — ED BEHAVIORAL HEALTH ASSESSMENT NOTE - CURRENT MEDICATION
Chago Palafox : Olanzapine 10mg po in am. Olanzapine 15mg po hs. Melatonin 3 mg  2 tabs po hs, Lorazepam 2mg po daily prn anxiety, Abilify Maintena 400mg once monthly IM - due on 1-20-20  Cyanocobalamin 50mcg po daily

## 2020-01-19 NOTE — ED PROVIDER NOTE - CLINICAL SUMMARY MEDICAL DECISION MAKING FREE TEXT BOX
pt is known schizophrenic. Recent hospitalization at [facility]. Presents after using crack. Suicidality+ flight of ideas. Exaggerated speech.   ekg lab ua tox Psych eval pt is known schizophrenic. Recent hospitalization at [facility]. Presents after using crack. Suicidality+ flight of ideas. Exaggerated speech.   ekg lab ua tox Psych eval  pt to be admited inpatient, bed pending
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
## 2020-01-20 PROCEDURE — 99213 OFFICE O/P EST LOW 20 MIN: CPT

## 2020-01-20 NOTE — CHART NOTE - NSCHARTNOTEFT_GEN_A_CORE
JULIO placed call to [facility] and spoke with employee, Azalea, whom reported patient is accepted to [facility]. Azalea requested SW fax EKG to Fax: [phone]. Fax completed. JULIO continues to follow.

## 2020-01-20 NOTE — CHART NOTE - NSCHARTNOTEFT_GEN_A_CORE
JULIO received call from [facility] employee, Azalea, whom reported patient cannot come to the facility. Patient does not have any medicare days left and [facility] does not accept Medicaid. JULIO placed call to [facility]. There are no available beds at the facility. JULIO placed call to patient's mother, Deisy ([phone]), to make aware patient is no longer accepted to [facility]. JULIO explained worker will continue to look for placement on behalf of patient.

## 2020-01-20 NOTE — ED BEHAVIORAL HEALTH NOTE - BEHAVIORAL HEALTH NOTE
PROGRESS NOTE: 20 @ 11:56  	  • Reason for Ongoing Consultation: 	Disorganized and suicidal ideation     ID: [id number] Male with HEALTH ISSUES - PROBLEM Dx:  Deferred condition on axis II  Cocaine abuse  Schizoaffective disorder, bipolar type without good prognostic features          INTERVAL DATA:   • Interval Chief Complaint:  "I know I am not right"  • Interval History:  Patient admits to feeling "delusional and self sacrificial". Patient was labile. He reports medications make him feel violent.  He reports he wants to die if he takes medications.  He states "medications make me talk about death and dying".   Patient speech was rapid and difficult ot interrupt.        REVIEW OF SYSTEMS:   • Constitutional Symptoms	No complaints  • Eyes	No complaints  • Ears / Nose / Throat / Mouth	No complaints  • Cardiovascular	No complaints  • Respiratory	No complaints  • Gastrointestinal	No complaints  • Genitourinary	No complaints  • Musculoskeletal	No complaints  • Skin	No complaints  • Neurological	No complaints  • Psychiatric (see HPI)	See HPI  • Endocrine	No complaints  • Hematologic / Lymphatic	No complaints  • Allergic / Immunologic	No complaints    REVIEW OF VITALS/LABS/IMAGING/INVESTIGATIONS:   • Vital signs reviewed: Yes  • Vital Signs:	    T(C): 37 (20 @ 23:43), Max: 37 (20 @ 15:20)  HR: 83 (20 @ 23:43) (83 - 93)  BP: 105/68 (20 @ 23:43) (105/68 - 134/88)  RR: 19 (20 @ 23:43) (18 - 20)  SpO2: 93% (20 @ 23:43) (93% - 98%)    • Available labs reviewed: Yes  • Available Lab Results:                           14.6   11.72 )-----------( 243      ( 2020 11:20 )             42.7         139  |  102  |  22.0<H>  ----------------------------<  107  4.0   |  21.0<L>  |  1.19    Ca    9.6      2020 11:20    TPro  7.6  /  Alb  4.6  /  TBili  0.8  /  DBili  x   /  AST  40<H>  /  ALT  20  /  AlkPhos  74      LIVER FUNCTIONS - ( 2020 11:20 )  Alb: 4.6 g/dL / Pro: 7.6 g/dL / ALK PHOS: 74 U/L / ALT: 20 U/L / AST: 40 U/L / GGT: x             Urinalysis Basic - ( 2020 12:45 )    Color: Yellow / Appearance: Clear / S.020 / pH: x  Gluc: x / Ketone: Trace  / Bili: Negative / Urobili: 1 mg/dL   Blood: x / Protein: 30 mg/dL / Nitrite: Negative   Leuk Esterase: Negative / RBC: 0-2 /HPF / WBC 0-2   Sq Epi: x / Non Sq Epi: Few / Bacteria: Few          MEDICATIONS:      PRN Medications:  • PRN Medications since last evaluation	  • PRN Details	    Current Medications:      Medication Side Effects:  • Medication Side Effects or Adverse Reactions (new or ongoing)	None known    SUICIDALITY:   • Suicidality (Interval)	reports suicidal ideation yesterday, vague about current ideation    HOMICIDALITY/AGGRESSION:   • Homicidality/Aggression	denies    MENTAL STATUS EXAM:   · General Appearance	Well developed  · Body Habitus	Well nourished  · Hygiene	Fair  · Grooming	Fair  · Behavior	guarded   · Eye Contact	Fair  · Relatedness	Fair  · Impulse Control	Normal  · Muscle Tone / Strength	Normal muscle tone/strength  · Abnormal Movements	No abnormal movements  · Gait / Station	Normal gait / station  · Speech Volume	Loud  · Speech Rate	Fast, difficult to interrupt  · Speech Spontaneity	Normal  · Speech Articulation	Normal  · Reported mood	Anxious  Irritable  · Observed mood	Irritable  · Affect Range	Labile  · Affect Congruence	Congruent  · Thought Process	Disorganized  · Thought Associations	Loose  · Thought Content	Unremarkable  · Perceptions	No abnormalities  · Oriented to Time	No  · Oriented to Place	Yes  · Oriented to Situation	Yes  · Oriented to Person	Yes  · Attention / Concentration	Impaired  · Estimated Intelligence	Average  · Recent Memory	Other  · Other	not cooperative  · Remote Memory	Other  · Other	not cooperative  · Fund of Knowledge	Normal  · Language	No abnormalities noted  · Judgment (regarding everyday events)	Poor  · Insight (regarding psychiatric illness)	Poor    FORMULATION:    Formulation:  · Summary (brief)	44 year old male recently discharged from [facility] 5 days ago and with chronic hx of schizoaffective d/o, substance abuse and medication non-compliance  brought to the ER after telling his mother he would kill himself. He remains psychotic, with BRAYAN, continued passive SI "if I have to be on psychiatric medications". Easily agitated, unhappy with his life, unreliable historian   · Differential	Schizophrenia  · Risk Assessment	Moderate Acute Suicide Risk  · Elevated Chronic Risk	Yes  · Rationale/Summary (include warning signs, risk indicators, protective factors, access to lethal means, collateral sources used, assessment data, rationale):	Risk factors include chronic mental illness, substance abuse which is active, his persistent refusal to take medications when at home, impulsivity problem. and when he begins to talk about killing himself.  Protective factors is his mothers continued support and that he goes to see.    AXIS:    Axis I:  Primary Dx Schizoaffective disorder, bipolar type without good prognostic features F25.0. Secondary Dx 1 Cocaine abuse F14.10.     Axis II:  Primary Dx Deferred condition on axis II R69.     Axis III:  · Axis III	see PE     Axis IV:  · Axis IV	Problem related to social environment     Axis V:  · GAF	30    PLAN:   Patient requires inpatient psychiatric hospitalization  Will restart Olanzapine  5mg twice daily   Haldol PRN

## 2020-01-21 PROCEDURE — 99212 OFFICE O/P EST SF 10 MIN: CPT

## 2020-01-21 RX ORDER — OLANZAPINE 15 MG/1
10 TABLET, FILM COATED ORAL DAILY
Refills: 0 | Status: DISCONTINUED | OUTPATIENT
Start: 2020-01-21 | End: 2020-02-05

## 2020-01-21 RX ADMIN — Medication 1 MILLIGRAM(S): at 09:48

## 2020-01-21 NOTE — ED BEHAVIORAL HEALTH NOTE - BEHAVIORAL HEALTH NOTE
PROGRESS NOTE: 20 @ 10:24  	  • Reason for Ongoing Consultation: 	    ID: 43 yo Male with HEALTH ISSUES - PROBLEM Dx:    Cocaine abuse  Schizoaffective disorder, bipolar type without good prognostic features          INTERVAL DATA:   • Interval Chief Complaint: "Im not well and want to stop using drugs"  • Interval History: 43 yo male with PMHx of polysubstance abuse and schizoaffective disorder was BIBA, his mother called the police. Pt was recently discharged from [facility] 6 days ago and states he was staying at house in [place] but left because he felt like people were watching him. On Saturday night around 7:30 pm pt states he left and went to the streets to smoke crack and CBD. On  morning he went to his mothers house and she called the police because he was high. Pt denies suicidal/homicidal ideations and states " my life means more to me than drugs." Pt states he sees TOM Caro at [facility] once a month, seen last week. Patient would like to go to a 28 day program for drug abuse. Admits to not taking his psychiatric medications.    Spoke with [facility] Team, patient is on Assisted Outpatient Treatment. He has not been compliant despite a court order.    REVIEW OF SYSTEMS:   • Constitutional Symptoms	No complaints  • Eyes	No complaints  • Ears / Nose / Throat / Mouth	No complaints  • Cardiovascular	No complaints  • Respiratory	No complaints  • Gastrointestinal	No complaints  • Genitourinary	No complaints  • Musculoskeletal	No complaints  • Skin	No complaints  • Neurological	No complaints  • Psychiatric (see HPI)	See HPI  • Endocrine	No complaints  • Hematologic / Lymphatic	No complaints  • Allergic / Immunologic	No complaints    REVIEW OF VITALS/LABS/IMAGING/INVESTIGATIONS:   • Vital signs reviewed: Yes  • Vital Signs:	    T(C): 37 (20 @ 07:12), Max: 37 (20 @ 07:12)  HR: 73 (20 @ 07:12) (68 - 74)  BP: 110/72 (20 @ 07:12) (105/71 - 133/83)  RR: 18 (20 @ 07:12) (18 - 19)  SpO2: 96% (20 @ 07:12) (93% - 96%)    • Available labs reviewed: Yes  • Available Lab Results:                           14.6   11.72 )-----------( 243      ( 2020 11:20 )             42.7     -19    139  |  102  |  22.0<H>  ----------------------------<  107  4.0   |  21.0<L>  |  1.19    Ca    9.6      2020 11:20    TPro  7.6  /  Alb  4.6  /  TBili  0.8  /  DBili  x   /  AST  40<H>  /  ALT  20  /  AlkPhos  74      LIVER FUNCTIONS - ( 2020 11:20 )  Alb: 4.6 g/dL / Pro: 7.6 g/dL / ALK PHOS: 74 U/L / ALT: 20 U/L / AST: 40 U/L / GGT: x             Urinalysis Basic - ( 2020 12:45 )    Color: Yellow / Appearance: Clear / S.020 / pH: x  Gluc: x / Ketone: Trace  / Bili: Negative / Urobili: 1 mg/dL   Blood: x / Protein: 30 mg/dL / Nitrite: Negative   Leuk Esterase: Negative / RBC: 0-2 /HPF / WBC 0-2   Sq Epi: x / Non Sq Epi: Few / Bacteria: Few          MEDICATIONS:      PRN Medications:  • PRN Medications since last evaluation	  • PRN Details	    Current Medications:   LORazepam     Tablet 1 milliGRAM(s) Oral every 6 hours PRN  OLANZapine 10 milliGRAM(s) Oral daily     Medication Side Effects:  • Medication Side Effects or Adverse Reactions (new or ongoing)	None known    MENTAL STATUS EXAM:   • Level of Consciousness	Alert  • General Appearance	Well developed  • Body Habitus	Well nourished  • Hygiene	Fair  • Grooming	Fair  • Behavior	Cooperative  • Eye Contact	Good  • Relatedness	Good  • Impulse Control	Normal  • Muscle Tone / Strength	Normal muscle tone/strength  • Abnormal Movements	restless   • Gait / Station	Normal gait / station  • Speech Volume	Normal  • Speech Rate	Fast  • Speech Spontaneity	Normal  • Speech Articulation	Normal  • Mood	anxious  • Affect Quality	nervous  • Affect Range	Full  • Affect Congruence	Congruent  • Thought Process	              Disorganized  • Thought Associations	Normal  • Thought Content	              Unremarkable  • Perceptions	No abnormalities  • Oriented to Time	Yes  • Oriented to Place	Yes  • Oriented to Situation	Yes  • Oriented to Person	Yes  • Attention / Concentration	Normal  • Estimated Intelligence	Average  • Recent Memory	Normal  • Remote Memory	Normal  • Fund of Knowledge	Normal  • Language	No abnormalities noted  • Judgment (regarding everyday events)	Fair  • Insight (regarding psychiatric illness)	Fair  SUICIDALITY:   • Suicidality (Interval)	none known  HOMICIDALITY/AGGRESSION:   • Homicidality/Aggression	none known  DIAGNOSIS DSM-V:    Psychiatric Diagnosis (Corresponds to DSM-IV Axis I, II):   HEALTH ISSUES - PROBLEM Dx:  Cocaine abuse  Schizoaffective disorder, bipolar type without good prognostic features   Medical Diagnosis (Corresponds to DSM-IV Axis III):  • Axis III	  none  ASSESSMENT OF CURRENT CONDITION:   Summary (include case differential, formulation and patient response to therapy):  43 yo male with PMHx of polysubstance abuse and schizoaffective disorder was BIBA, his mother called the police. Pt was discharged from [facility] 6 days ago, is non compliant on his psychiatric medications, is labile  and is using drugs.  Although  today Pt denies suicidal/homicidal ideations and states " my life means more to me than drugs." Yesterday he endorsed  both violent and self injurious urges. Pt states he sees T[facility] Caro at [facility] once a month, seen last week. Patient would like to go to a 28 day program for drug abuse however is refusing meds despite AOT order his psych meds..     Risk Assessment (consider static vs modifiable risk factors and protective factors; comment on level of risk for dangerous behavior): Risk Factors: Patient does not have a place to live, chronic mental illness, active  substance abuse. Protective Factors: goes to see mother, expresses wish to stop taking drugs.    PLAN:  Admit for Inpatient Treatment  9.37 application

## 2020-01-21 NOTE — ED BEHAVIORAL HEALTH NOTE - BEHAVIORAL HEALTH NOTE
SW Note: Plan is to transfer pt for inpt psychiatric care. Pt is followed by [facility] team [phone] and is under an AOT order. Pt has medicare ( exhausted days) and straight medicaid. Due to ins unable to refer to [facility] and [facility]. Called the following and no beds available at this time: [facility], [facility], [facility], [facility], [facility], [facility], [place], [facility] and [place]. [facility] would not give bed census, chart faxed, msg left for intake dept at [facility]. SW to follow

## 2020-01-22 RX ORDER — ARIPIPRAZOLE 15 MG/1
400 TABLET ORAL ONCE
Refills: 0 | Status: COMPLETED | OUTPATIENT
Start: 2020-01-22 | End: 2020-01-22

## 2020-01-22 RX ADMIN — Medication 1 MILLIGRAM(S): at 09:18

## 2020-01-22 RX ADMIN — ARIPIPRAZOLE 400 MILLIGRAM(S): 15 TABLET ORAL at 10:53

## 2020-01-22 NOTE — ED BEHAVIORAL HEALTH NOTE - BEHAVIORAL HEALTH NOTE
PROGRESS NOTE: 01-22-20 @ 13:52  	  • Reason for Ongoing Consultation: 	    ID: 45yo Male with HEALTH ISSUES - PROBLEM Dx:  Cocaine abuse  Schizoaffective disorder, bipolar type without good prognostic features      INTERVAL DATA:   • Interval Chief Complaint:   • Interval History: Pt is lying in bed calmly answering questions. Refusing Olanzapine, accepted Abilify 400 mg IM. Patient states he feels good and is wondering where he will be transferred. Pt still wants to quit drugs and denies suicidal ideations / homicidal ideations and violent thoughts. He says the Abilify is helping and feels better than yesterday.      REVIEW OF SYSTEMS:   • Constitutional Symptoms	No complaints  • Eyes	No complaints  • Ears / Nose / Throat / Mouth	No complaints  • Cardiovascular	No complaints  • Respiratory	No complaints  • Gastrointestinal	No complaints  • Genitourinary	No complaints  • Musculoskeletal	No complaints  • Skin	No complaints  • Neurological	No complaints  • Psychiatric (see HPI)	See HPI  • Endocrine	No complaints  • Hematologic / Lymphatic	No complaints  • Allergic / Immunologic	No complaints    REVIEW OF VITALS/LABS/IMAGING/INVESTIGATIONS:   • Vital signs reviewed: Yes  • Vital Signs:	    T(C): 36.6 (01-22-20 @ 11:24), Max: 37.2 (01-21-20 @ 16:03)  HR: 67 (01-22-20 @ 11:24) (66 - 85)  BP: 113/75 (01-22-20 @ 11:24) (113/73 - 127/69)  RR: 18 (01-22-20 @ 11:24) (17 - 19)  SpO2: 98% (01-22-20 @ 11:24) (93% - 98%)    • Available labs reviewed: Yes  • Available Lab Results:       MEDICATIONS:      PRN Medications:  • PRN Medications since last evaluation	  • PRN Details	    Current Medications:   LORazepam     Tablet 1 milliGRAM(s) Oral every 6 hours PRN  OLANZapine 10 milliGRAM(s) Oral daily     Medication Side Effects:  • Medication Side Effects or Adverse Reactions (new or ongoing)	None known    MENTAL STATUS EXAM:   • Level of Consciousness	Alert  • General Appearance	Well developed  • Body Habitus	Well nourished  • Hygiene	Good  • Grooming	Good  • Behavior	Cooperative  • Eye Contact	Good  • Relatedness	Good  • Impulse Control	Normal  • Muscle Tone / Strength	Normal muscle tone/strength  • Abnormal Movements	No abnormal movements  • Gait / Station	Normal gait / station  • Speech Volume	Normal  • Speech Rate	Normal  • Speech Spontaneity	Normal  • Speech Articulation	Normal  • Mood	                            Normal  • Affect Quality	              Euthymic  • Affect Range	              Full  • Affect Congruence	Congruent  • Thought Process	              Linear  • Thought Associations	Normal  • Thought Content	              Unremarkable  • Perceptions	              No abnormalities  • Oriented to Time 	Yes  • Oriented to Place 	Yes  • Oriented to Situation	Yes  • Oriented to Person	Yes  • Attention / Concentration	Normal  • Estimated Intelligence	Average  • Recent Memory	Normal  • Remote Memory	Normal  • Fund of Knowledge	Normal  • Language	No abnormalities noted  • Judgment (regarding everyday events)	Fair  • Insight (regarding psychiatric illness)	Fair    SUICIDALITY:   • Suicidality (Interval)	none known    HOMICIDALITY/AGGRESSION:   • Homicidality/Aggression	none known    DIAGNOSIS DSM-V:    Psychiatric Diagnosis (Corresponds to DSM-IV Axis I, II):   HEALTH ISSUES - PROBLEM Dx:    Cocaine abuse  Schizoaffective disorder, bipolar type without good prognostic features           Medical Diagnosis (Corresponds to DSM-IV Axis III):  • Axis III	none      ASSESSMENT OF CURRENT CONDITION:   Summary (include case differential, formulation and patient response to therapy): Pt is lying in bed calmly answering questions. Refusing Olanzapine, accepted Abilify 400 mg IM. Patient states he feels better than yesterday after taking his medication. Pt still wants to quit drugs and denies suicidal ideations / homicidal ideations and violent thoughts.        Risk Assessment (consider static vs modifiable risk factors and protective factors; comment on level of risk for dangerous behavior): Risk Factors: Patient does not have a place to live, chronic mental illness, active  substance abuse. Protective Factors: goes to see mother, expresses wish to stop taking drugs.      PLAN  monitor for response  to Abilify  hold for re-assessmsnet in AM    delusions and dangerous symptoms appear to be abating

## 2020-01-22 NOTE — ED BEHAVIORAL HEALTH NOTE - BEHAVIORAL HEALTH NOTE
SW Note: PLan is to transfer pt for inpt psychiatric care. Pt does not have medicare days left and placement would be under his medicaid policy. No available beds for transfer today.  provider and  staff aware. Following facilities were called: [facility], [facility], [place], [facility] ( [facility] had over 20 in their ED) , , [facility], [facility], [facility], [facility], [facility] and [facility]. Due to ins issue unable to refer to [facility] and [facility].

## 2020-01-23 VITALS
OXYGEN SATURATION: 96 % | SYSTOLIC BLOOD PRESSURE: 117 MMHG | TEMPERATURE: 98 F | RESPIRATION RATE: 20 BRPM | DIASTOLIC BLOOD PRESSURE: 71 MMHG | HEART RATE: 84 BPM

## 2020-01-23 PROCEDURE — 85027 COMPLETE CBC AUTOMATED: CPT

## 2020-01-23 PROCEDURE — 80053 COMPREHEN METABOLIC PANEL: CPT

## 2020-01-23 PROCEDURE — 80307 DRUG TEST PRSMV CHEM ANLYZR: CPT

## 2020-01-23 PROCEDURE — 96372 THER/PROPH/DIAG INJ SC/IM: CPT

## 2020-01-23 PROCEDURE — 99213 OFFICE O/P EST LOW 20 MIN: CPT

## 2020-01-23 PROCEDURE — 36415 COLL VENOUS BLD VENIPUNCTURE: CPT

## 2020-01-23 PROCEDURE — 93005 ELECTROCARDIOGRAM TRACING: CPT

## 2020-01-23 PROCEDURE — 81001 URINALYSIS AUTO W/SCOPE: CPT

## 2020-01-23 PROCEDURE — 99284 EMERGENCY DEPT VISIT MOD MDM: CPT | Mod: 25

## 2020-01-23 RX ADMIN — Medication 1 MILLIGRAM(S): at 08:36

## 2020-01-23 NOTE — ED BEHAVIORAL HEALTH NOTE - BEHAVIORAL HEALTH NOTE
SW Note: pt has been cleared for discharge by  provider. Pt given IM medication yesterday. Called and spoke with Nina at [facility] team, [phone]. They will follow up with pt in the community. Pt is also under an AOT order. Pt is currently homeless and the ACT does not have any housing options at this time. Had discussed possible referral to [facility]. pt has been there before and has had behavioral issues and kicked out. The pt declined a referral to [facility] and requested bus tickets to go to [facility] for shelter placement. pt signed consent form and  D/C note and meds given from 1/19 thru today faxed to [facility] for continued care, Fax# [phone].

## 2020-01-23 NOTE — ED ADULT NURSE REASSESSMENT NOTE - GENERAL PATIENT STATE
resting/sleeping
resting/sleeping
comfortable appearance
cooperative/comfortable appearance
resting/sleeping
resting/sleeping
comfortable appearance
comfortable appearance
comfortable appearance/cooperative
cooperative
cooperative
resting/sleeping
resting/sleeping/comfortable appearance

## 2020-01-23 NOTE — ED ADULT NURSE REASSESSMENT NOTE - NS ED NURSE REASSESS COMMENT FT1
Patient contacted his mother via telephone but ended the call abruptly.  Patient provided reassurance when he questioned if resting in bed would be held against him.
Patient mother Abeba can be contacted at cellphone [phone] and Home phone [phone]
Pt brought to  as per MD Ceron for further evaluation, no IV access in place, pt remains in yellow gown, calm and cooperative, ambulatory to  w/out assistance, mother w/ pt, will remain in ED to speak w/ staff, report given.
Pt with flight of ideas at this time, stating "I can't pee I need to go on vacation first", pt provided with gingerale and urine cup, 1:1 aide at bedside for safety
oob ambulatory to bathroom requested and received apple juice
patient out of bed to nurses station requesting water and given patient asking when he was leaving and to where. Explained that he would be staying until later today and that SW team is working on placement for. patient states understanding of same and then went back to bed. safe environment maintained
pt resting on stretcher , engaged in conversation, pt has a flat affect, denies intent to harm self or others, denies avh. pt made aware of likely isabel of staying over night in  due to no beds available at this time.
pt resting on stretcher s/p dinner exhibiting no acute distress, pt offers no complaints at this time. pt's area screened for safety.
pt sleeping comfortably, in no apparent distress or discomfort. will continue to monitor.
pt was awake and alert, came into common area for lunch and ate 100%, pt offered no complaints at this time, plan of care discussed. pt is calm and cooperative.
remains in room through this  time resting quietly
patient resting/sleeping at long intervals nad noted. safe environment maintained
patient sleeping at long intervals patient woke for vital signs an charted no c/o will maintain safe environment
Patient ate 100% of his lunch.  Patient not overtly expressing delusional thoughts during interaction.  Patient expressing desire to go to rehab in the future to help him cease his drug use.  No attempts to harm self or others.  Patient receptive to verbal support and reassurance.  Safety of patient maintained.
Patient cooperated with vital sign assessment.  Patient ate 100% of his dinner.  Educated about pending transfer when bed is available and he agrees with plan of care.
patient out of bed to bathroom ambulates with steady gait no c/o will monitor and chart changes maintain safe environment
received report.  received pt in room.  states feeling better taking medication and took shot today.  pt stating he would like to go to rehab.  pt cooperative and pleasant at this time
Assumed care of patient at 0720.  Patient laying in bed awake.  Patient calm and pleasant on interaction.  Patient oriented to staff and educated about plan of care.  No attempts to harm self or others and safety maintained.
Assumed care of patient at 0720.  Patient resting in bed asleep with no distress.  Safety of patient maintained.
Assumed care of patient at 0725.  Patient resting in bed, appears to be sleeping with no distress.  Safety of patient maintained.
Assumed care of patient. patient out to nurses station requesting that the lights in the hallway be turned off. patient informed that there is no control over the light but moved patient's bed out to stream of light. patient  patient offer po fluids and tolerated well.  will monitor and chart changes
Patient ate 100% of his breakfast.  Patient offered and accepted Ativan 1mg PO 0918 for anxiety with good effect.  Patient refused Zyprexa but is requesting Abilify long acting injection.  Dr. Jung notified and medication to be administered when available.  Safety of patient maintained.
Patient ate 100% of his dinner.  Patient verbalized knowledge of plan of care related to pending transfer.  Patient reports he does not feel treatment with medication would benefit him.  No attempts to harm self or others and safety maintained.
Patient awake out of his room, ambulating with a steady independent gait.  Patient ate 100% of his breakfast and then returned to bed.  Safety of patient maintained.
Patient complaint of anxiety.  Patient accepted Ativan 1mg PO at 0948.  Patient refused Zyprexa as ordered after education and encouragement to comply and Dr. Jung notified.  Patient reeducated about plan of care about pending transfer when psychiatric bed available.  Safety of patient maintained.
assumed care ofpt at 1915 patient alert and coopertive. patient awakes to verbal stimuli to states feeling better. patient awaiting on of care of plan of care.  patient sleeping at long intervals .  safe environemnt maintained

## 2020-01-23 NOTE — ED ADULT NURSE REASSESSMENT NOTE - COMFORT CARE
ambulated to bathroom
meal provided/plan of care explained
meal provided/plan of care explained
ambulated to bathroom/plan of care explained/meal provided
darkened lights
darkened lights/po fluids offered/side rails down/repositioned/warm blanket provided/ambulated to bathroom
darkened lights/side rails down/warm blanket provided/plan of care explained/po fluids offered
meal provided
meal provided/plan of care explained
plan of care explained
plan of care explained
plan of care explained/po fluids offered

## 2020-01-23 NOTE — ED BEHAVIORAL HEALTH NOTE - BEHAVIORAL HEALTH NOTE
PROGRESS NOTE: 01-23-20 @ 09:28  	  • Reason for Ongoing Consultation: 	    ID: 45yo Male with HEALTH ISSUES - PROBLEM Dx:  Cocaine abuse  Schizoaffective disorder, bipolar type without good prognostic features          INTERVAL DATA:   • Interval Chief Complaint: I feel better  • Interval History: Pt is feeling well this morning, denies any Suicidal/Homicidal Ideations or Violent thoughts. Patient received Ativan 1 mg PO PRN. Pt showered and is calmly waiting for discharge. Discussed possibly going to [facility]. Social work to touch base with [facility] Team for options.    REVIEW OF SYSTEMS:   • Constitutional Symptoms	No complaints  • Eyes	No complaints  • Ears / Nose / Throat / Mouth	No complaints  • Cardiovascular	No complaints  • Respiratory	No complaints  • Gastrointestinal	No complaints  • Genitourinary	No complaints  • Musculoskeletal	No complaints  • Skin	No complaints  • Neurological	No complaints  • Psychiatric (see HPI)	See HPI  • Endocrine	No complaints  • Hematologic / Lymphatic	No complaints  • Allergic / Immunologic	No complaints    REVIEW OF VITALS/LABS/IMAGING/INVESTIGATIONS:   • Vital signs reviewed: Yes  • Vital Signs:	    T(C): 36.8 (01-23-20 @ 07:35), Max: 36.9 (01-22-20 @ 18:40)  HR: 84 (01-23-20 @ 07:35) (66 - 89)  BP: 117/71 (01-23-20 @ 07:35) (102/67 - 117/71)  RR: 20 (01-23-20 @ 07:35) (17 - 20)  SpO2: 96% (01-23-20 @ 07:35) (96% - 98%)    • Available labs reviewed: Yes  • Available Lab Results:       MEDICATIONS:      PRN Medications:  • PRN Medications since last evaluation	  • PRN Details	    Current Medications:   LORazepam     Tablet 1 milliGRAM(s) Oral every 6 hours PRN  OLANZapine 10 milliGRAM(s) Oral daily     Medication Side Effects:  • Medication Side Effects or Adverse Reactions (new or ongoing)	Pt states Olanzapine makes him dizzy with the Abilify    MENTAL STATUS EXAM:   • Level of Consciousness	Alert  • General Appearance	Well developed  • Body Habitus	Well nourished  • Hygiene	Good  • Grooming	Good  • Behavior	Cooperative  • Eye Contact	Good  • Relatedness	Good  • Impulse Control	Normal  • Muscle Tone / Strength	Normal muscle tone/strength  • Abnormal Movements	No abnormal movements  • Gait / Station	Normal gait / station  • Speech Volume	Normal  • Speech Rate	Normal  • Speech Spontaneity	Normal  • Speech Articulation	Normal  • Mood	Normal  • Affect Quality	Euthymic  • Affect Range	Full  • Affect Congruence	Congruent  • Thought Process	              Linear  • Thought Associations	Normal  • Thought Content	Unremarkable  • Perceptions	No abnormalities  • Oriented to Time	Yes  • Oriented to Place	Yes  • Oriented to Situation	Yes  • Oriented to Person	Yes  • Attention / Concentration	Normal  • Estimated Intelligence	Average  • Recent Memory	Normal  • Remote Memory	Normal  • Fund of Knowledge	Normal  • Language	No abnormalities noted  • Judgment (regarding everyday events)	Fair  • Insight (regarding psychiatric illness)	Fair    SUICIDALITY:   • Suicidality (Interval)	none known    HOMICIDALITY/AGGRESSION:   • Homicidality/Aggression	none known    DIAGNOSIS DSM-V:    Psychiatric Diagnosis (Corresponds to DSM-IV Axis I, II):   HEALTH ISSUES - PROBLEM Dx:  Cocaine abuse  Schizoaffective disorder, bipolar type without good prognostic features  Deferred condition on axis II       Medical Diagnosis (Corresponds to DSM-IV Axis III):  • Axis III	none      ASSESSMENT OF CURRENT CONDITION:   Summary (include case differential, formulation and patient response to therapy): At present pt is of no harm to himself or others. Social work working on discharge plan as patient states he is homeless.    Risk Assessment (consider static vs modifiable risk factors and protective factors; comment on level of risk for dangerous behavior):low acute risk but elevated chronic risk due to  cocaine abuse, schizoaffective disorder, bipolar type, homeless non compliance w treatment    PLAN  discharge today  f/u w ACT team

## 2020-02-19 PROBLEM — Z00.00 ENCOUNTER FOR PREVENTIVE HEALTH EXAMINATION: Status: ACTIVE | Noted: 2020-02-19

## 2020-02-25 ENCOUNTER — APPOINTMENT (OUTPATIENT)
Dept: FAMILY MEDICINE | Facility: CLINIC | Age: 45
End: 2020-02-25

## 2020-03-02 NOTE — ED ADULT TRIAGE NOTE - WEIGHT IN LBS
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, mets to liver and is being treated by [facility]  Now at home and is doing better  Still has faitguvarsha  Review of Systems   Constitutional: Positive for appetite change and fatigue  Respiratory: Negative for cough and shortness of breath  Cardiovascular: Negative for chest pain and palpitations  Gastrointestinal: Positive for abdominal pain and diarrhea  Musculoskeletal: Positive for arthralgias  All other systems reviewed and are negative        Objective:    Vitals:    03/02/20 1047   Weight: 57 2 kg (126 lb)   Height: 5' 1" (1 549 m)       Current Outpatient Medications on File Prior to Visit   Medication Sig Dispense Refill    ALPRAZolam (XANAX) 0 25 mg tablet Take by mouth daily at bedtime as needed       aspirin (ESTELA ASPIRIN) 325 mg tablet Take 325 mg by mouth daily       Azelastine HCl 137 MCG/SPRAY SOLN instill 2 sprays into each nostril twice a day if needed for congestion  0    calcium carbonate (TUMS) 500 mg chewable tablet Chew 1 tablet daily as needed for indigestion or heartburn      diphenhydrAMINE (BENADRYL) 25 mg tablet Take 25 mg by mouth every 6 (six) hours as needed for itching      diphenoxylate-atropine (LOMOTIL) 2 5-0 025 mg per tablet TAKE 1 TABLET BY MOUTH 4 TIMES DAILY AS NEEDED FOR DIARRHEA  0    docusate sodium (COLACE) 100 mg capsule Take 100 mg by mouth 2 (two) times a day as needed for constipation      famotidine (PEPCID) 10 mg tablet Take 10 mg by mouth 2 (two) times a day      furosemide (LASIX) 20 mg tablet Take 20 mg by mouth daily       hydroxychloroquine (PLAQUENIL) 200 mg tablet Take 200 mg by mouth daily in the early morning       loperamide (IMODIUM) 2 mg capsule Take 2 capsules by mouth 4 (four) times a day as needed      ondansetron (ZOFRAN) 8 mg tablet take 1 tablet by mouth every 8 hours if needed for nausea      pantoprazole (PROTONIX) 40 mg tablet Take 40 mg by mouth daily      TOPROL XL 25 MG 24 hr tablet 25 mg daily       Triamcinolone Acetonide (NASACORT ALLERGY 24HR NA) into each nostril 1 spray each nostril--at bedtime   (OTC)      [START ON 4/7/2020] ofloxacin (OCUFLOX) 0 3 % ophthalmic solution Starting April 7  0    [START ON 4/7/2020] prednisoLONE acetate (PRED FORTE) 1 % ophthalmic suspension POST OP: 1 DROP INTO LEFT EYE 4 TIMES DAILY; CONTINUE AS DIRECTED  0     No current facility-administered medications on file prior to visit  Physical Exam   Constitutional: She appears well-developed  HENT:   Head: Normocephalic  Eyes: Pupils are equal, round, and reactive to light  Cardiovascular: Normal rate, regular rhythm, normal heart sounds and intact distal pulses  Pulmonary/Chest: Effort normal and breath sounds normal    Abdominal: Soft  Bowel sounds are normal    Musculoskeletal: Normal range of motion  Neurological: She is alert  Skin: Skin is warm and dry  Psychiatric: She has a normal mood and affect  Her behavior is normal          Transitional Care Management Review:    TCM Call (since 1/31/2020)     Date and time call was made  2/24/2020  8:12 AM    Hospital care reviewed  Records reviewed    Patient was hospitialized at  [facility];  Other (comment)    Comment  [facility] [place]    Date of Admission  02/19/20    Date of discharge  02/20/20    Diagnosis  admitting dx---GIST, d/c dx--same and her active problems    Disposition  Home    Were the patients medications reviewed and updated  Yes    Current Symptoms  None      TCM Call (since 1/31/2020)     Should patient be enrolled in anticoag monitoring? No    Scheduled for follow up? Yes    Patients specialists  Other (comment)    Other specialists names  Nyasia Abernathy MD - Gastroenterology     Other specialists contcat #  [id number]     Did you obtain your prescribed medications  Yes    Do you need help managing your prescriptions or medications  No    Is transportation to your appointment needed  No    I have advised the patient to call PCP with any new or worsening symptoms  Cristobal Jewell 28; Family    Are you recieving any outpatient services  No    Are you recieving home care services  No    Comments  I spoke to pt  She is doing ok, but does have an URI, but is doing OTC--I told her to call if she needs anything  We reviewed the meds and the chart has been updated  Has f/u 2/24/20 with gb for TCM  dk              Assessment/Plan:     1  Malignant gastrointestinal stromal tumor (GIST) of other site ([facility] )     2  Metastatic cancer ([facility] )     3  Anemia due to other cause, not classified     4  Dehydration     5   IVIS (acute kidney injury) ([facility] )              Kina Richard MD 160

## 2020-04-28 ENCOUNTER — EMERGENCY (EMERGENCY)
Facility: HOSPITAL | Age: 45
LOS: 1 days | Discharge: HOME | End: 2020-04-28
Attending: EMERGENCY MEDICINE
Payer: MEDICARE

## 2020-04-28 VITALS
OXYGEN SATURATION: 95 % | DIASTOLIC BLOOD PRESSURE: 82 MMHG | SYSTOLIC BLOOD PRESSURE: 121 MMHG | TEMPERATURE: 99 F | HEIGHT: 68 IN | HEART RATE: 91 BPM | WEIGHT: 149.91 LBS | RESPIRATION RATE: 16 BRPM

## 2020-04-28 LAB
ALBUMIN SERPL ELPH-MCNC: 4.6 G/DL — SIGNIFICANT CHANGE UP (ref 3.3–5.2)
ALP SERPL-CCNC: 78 U/L — SIGNIFICANT CHANGE UP (ref 40–120)
ALT FLD-CCNC: 27 U/L — SIGNIFICANT CHANGE UP
ANION GAP SERPL CALC-SCNC: 16 MMOL/L — SIGNIFICANT CHANGE UP (ref 5–17)
APAP SERPL-MCNC: <7.5 UG/ML — LOW (ref 10–26)
AST SERPL-CCNC: 50 U/L — HIGH
BILIRUB SERPL-MCNC: 0.8 MG/DL — SIGNIFICANT CHANGE UP (ref 0.4–2)
BUN SERPL-MCNC: 24 MG/DL — HIGH (ref 8–20)
CALCIUM SERPL-MCNC: 9.7 MG/DL — SIGNIFICANT CHANGE UP (ref 8.6–10.2)
CHLORIDE SERPL-SCNC: 100 MMOL/L — SIGNIFICANT CHANGE UP (ref 98–107)
CO2 SERPL-SCNC: 24 MMOL/L — SIGNIFICANT CHANGE UP (ref 22–29)
CREAT SERPL-MCNC: 1.31 MG/DL — HIGH (ref 0.5–1.3)
ETHANOL SERPL-MCNC: <10 MG/DL — SIGNIFICANT CHANGE UP
GLUCOSE SERPL-MCNC: 97 MG/DL — SIGNIFICANT CHANGE UP (ref 70–99)
HCT VFR BLD CALC: 43.4 % — SIGNIFICANT CHANGE UP (ref 39–50)
HGB BLD-MCNC: 14.9 G/DL — SIGNIFICANT CHANGE UP (ref 13–17)
MCHC RBC-ENTMCNC: 30.8 PG — SIGNIFICANT CHANGE UP (ref 27–34)
MCHC RBC-ENTMCNC: 34.3 GM/DL — SIGNIFICANT CHANGE UP (ref 32–36)
MCV RBC AUTO: 89.9 FL — SIGNIFICANT CHANGE UP (ref 80–100)
PLATELET # BLD AUTO: 202 K/UL — SIGNIFICANT CHANGE UP (ref 150–400)
POTASSIUM SERPL-MCNC: 4 MMOL/L — SIGNIFICANT CHANGE UP (ref 3.5–5.3)
POTASSIUM SERPL-SCNC: 4 MMOL/L — SIGNIFICANT CHANGE UP (ref 3.5–5.3)
PROT SERPL-MCNC: 7.2 G/DL — SIGNIFICANT CHANGE UP (ref 6.6–8.7)
RBC # BLD: 4.83 M/UL — SIGNIFICANT CHANGE UP (ref 4.2–5.8)
RBC # FLD: 13.8 % — SIGNIFICANT CHANGE UP (ref 10.3–14.5)
SALICYLATES SERPL-MCNC: <0.6 MG/DL — LOW (ref 10–20)
SODIUM SERPL-SCNC: 140 MMOL/L — SIGNIFICANT CHANGE UP (ref 135–145)
WBC # BLD: 8.09 K/UL — SIGNIFICANT CHANGE UP (ref 3.8–10.5)
WBC # FLD AUTO: 8.09 K/UL — SIGNIFICANT CHANGE UP (ref 3.8–10.5)

## 2020-04-28 PROCEDURE — 80307 DRUG TEST PRSMV CHEM ANLYZR: CPT

## 2020-04-28 PROCEDURE — 93005 ELECTROCARDIOGRAM TRACING: CPT

## 2020-04-28 PROCEDURE — 36415 COLL VENOUS BLD VENIPUNCTURE: CPT

## 2020-04-28 PROCEDURE — 93010 ELECTROCARDIOGRAM REPORT: CPT

## 2020-04-28 PROCEDURE — 99284 EMERGENCY DEPT VISIT MOD MDM: CPT | Mod: 25

## 2020-04-28 PROCEDURE — 90792 PSYCH DIAG EVAL W/MED SRVCS: CPT

## 2020-04-28 PROCEDURE — 85027 COMPLETE CBC AUTOMATED: CPT

## 2020-04-28 PROCEDURE — 99284 EMERGENCY DEPT VISIT MOD MDM: CPT | Mod: CS

## 2020-04-28 PROCEDURE — 87635 SARS-COV-2 COVID-19 AMP PRB: CPT

## 2020-04-28 PROCEDURE — 96372 THER/PROPH/DIAG INJ SC/IM: CPT

## 2020-04-28 PROCEDURE — 80053 COMPREHEN METABOLIC PANEL: CPT

## 2020-04-28 RX ORDER — FLUPHENAZINE HYDROCHLORIDE 1 MG/1
50 TABLET, FILM COATED ORAL ONCE
Refills: 0 | Status: COMPLETED | OUTPATIENT
Start: 2020-04-28 | End: 2020-04-28

## 2020-04-28 RX ORDER — DIVALPROEX SODIUM 500 MG/1
500 TABLET, DELAYED RELEASE ORAL DAILY
Refills: 0 | Status: DISCONTINUED | OUTPATIENT
Start: 2020-04-28 | End: 2020-05-03

## 2020-04-28 RX ORDER — FLUPHENAZINE HYDROCHLORIDE 1 MG/1
2.5 TABLET, FILM COATED ORAL DAILY
Refills: 0 | Status: DISCONTINUED | OUTPATIENT
Start: 2020-04-28 | End: 2020-05-03

## 2020-04-28 RX ORDER — BENZTROPINE MESYLATE 1 MG
1 TABLET ORAL
Refills: 0 | Status: DISCONTINUED | OUTPATIENT
Start: 2020-04-28 | End: 2020-05-03

## 2020-04-28 RX ADMIN — FLUPHENAZINE HYDROCHLORIDE 50 MILLIGRAM(S): 1 TABLET, FILM COATED ORAL at 18:54

## 2020-04-28 NOTE — ED BEHAVIORAL HEALTH ASSESSMENT NOTE - DESCRIPTION
Denies Resides intermittently with mother. Patient was calm, polite. He did not appear to be responding to internal stimuli.  He was not noted to be aggressive or agitated.  He consistently denied any S/H I/I/P.  Will give patient Prolixin 50 decanoate 50 mg x 1 dose     Vital Signs Last 24 Hrs  T(C): 37.1 (28 Apr 2020 15:51), Max: 37.1 (28 Apr 2020 15:51)  T(F): 98.8 (28 Apr 2020 15:51), Max: 98.8 (28 Apr 2020 15:51)  HR: 91 (28 Apr 2020 15:51) (91 - 91)  BP: 121/82 (28 Apr 2020 15:51) (121/82 - 121/82)  BP(mean): --  RR: 16 (28 Apr 2020 15:51) (16 - 16)  SpO2: 95% (28 Apr 2020 15:51) (95% - 95%)
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
## 2020-04-28 NOTE — ED PROVIDER NOTE - NS_EDPROVIDERDISPOUSERTYPE_ED_A_ED
Attending Attestation (For Attendings USE Only).../Medical Students Attestation (For Medical Student USE Only)... Attending Attestation (For Attendings USE Only)...
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
## 2020-04-28 NOTE — ED BEHAVIORAL HEALTH ASSESSMENT NOTE - CURRENT MEDICATION
Prolixin 2.5 mg daily, Seroquel 100 mg at night, Cogentin 1 mg BID, Depakote 500 mg in am and 1000 mg HS, Prolixin decanoate 50 mg Q two weeks (due 4/27/20)

## 2020-04-28 NOTE — ED BEHAVIORAL HEALTH ASSESSMENT NOTE - OTHER PAST PSYCHIATRIC HISTORY (INCLUDE DETAILS REGARDING ONSET, COURSE OF ILLNESS, INPATIENT/OUTPATIENT TREATMENT)
h/o over 40 inpatient psychiatric hospitalizations for schizophrenia.  Course is complicated by substance use. Currently followed by [facility] ACT team. Last discharged from hospital in March ([facility]) h/o over 40 inpatient psychiatric hospitalizations for schizophrenia.  Course is complicated by substance use. Currently followed by [facility] ACT team. Last discharged from hospital in March ([facility]) where he was admitted for psychotic sx's and disorganized behavior.

## 2020-04-28 NOTE — ED PROVIDER NOTE - OBJECTIVE STATEMENT
43 yo male pmh schizoaffective disorder on prolixin brought in by ambulance because pt missed monthly dose of prolixin; pt admits  to using crack cocaine;  denies any headache, fever, cough, or chest pain; pt states rarely misses his medications; ACT sent ambulance to bring patient to ED for evaluation 45 yo male pmh schizophrenia   on prolixin brought in by ambulance because pt missed monthly dose of prolixin; pt admits  to using crack cocaine;  denies any headache, fever, cough, or chest pain; pt states rarely misses his medications; ACT sent ambulance to bring patient to ED for evaluation

## 2020-04-28 NOTE — ED ADULT NURSE REASSESSMENT NOTE - DESCRIPTION
received report.  received pt in room laying in bed awake alert. no c/o offered.  pt is cooperative at this time. orders noted as per prior rn urine is not needed.  awaiting sw for dispo.  awaiting covid results

## 2020-04-28 NOTE — ED ADULT TRIAGE NOTE - CHIEF COMPLAINT QUOTE
Pt A&Ox4 states "I missed my injection today."  BIBA c/o missing psychiatric injection Prolxin and hasn't taking his Seroquel. Patient denies any SI/HI, admits to crack use last night, ambulated into ED with steady gait no obvious signs of injury or trauma. MD STARKS at bedside to clear for BH.
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
## 2020-04-28 NOTE — ED ADULT NURSE NOTE - OBJECTIVE STATEMENT
pt states that he missed his appointment with "AOT" for his Prolixin Dec. injection. pt denies suicidal or homicidal ideations. pt is calm and cooperative, pt denies auditory or visual hallucinations. pt is A&O x3, pt speaking in an organized manner. pt makes good eye contact, grooming and hygiene is poor.

## 2020-04-28 NOTE — ED ADULT NURSE REASSESSMENT NOTE - NS ED NURSE REASSESS COMMENT FT1
pt evaluated by psychiatry and cleared. pt offered meal which he consumed 100%. pt received Prolixin decanoate im and tolerated well.

## 2020-04-28 NOTE — CHART NOTE - NSCHARTNOTEFT_GEN_A_CORE
SW Note: Per psych team, pt is T&R, and covid swab is pending. SW received verbal handoff from RN that pt reported he is not welcome to return to his mother's house where he was staying as she is sick and pt is not self isolating. Pt mentioned to psych. that he may be able to stay with a friend. SW met with pt at bedside to discuss further. Pt stating he does not think his mom will let him return, provided SW with his mother's # to call to inquire if he can go home tonight (Abeba- [phone]; [phone]). SW placed call to pt's mom, Pt's mo stating pt cannot return home as he is not self isolating and she is fearful for her own health, as well as other members of the household. Pt's mother requesting covid test results prior to pt returning. SW explained that there needs to be a d/c plan in place regardless of results, and legally this is pt's residence. Pt's mother still refusing pt to return at this time. MD made aware, Dr. Andino spoke to pt's mother as well, who is still declining accepting pt back home at this time. SW explored with pt option of staying with friend he mentioned to the psychiatrist. Pt reports "it's some noa I heard of who takes people in." SW inquired if he had any sort of relationship with him or knew for certain he could stay with him, which pt stated he did not. SW will continue to follow for d/c plan- (potentially emergency housing) when pt's covid results come back.

## 2020-04-28 NOTE — ED PROVIDER NOTE - PATIENT PORTAL LINK FT
You can access the FollowMyHealth Patient Portal offered by [facility] by registering at the following website: [email or web address]. By joining [facility]’s FollowMyHealth portal, you will also be able to view your health information using other applications (apps) compatible with our system.

## 2020-04-28 NOTE — ED BEHAVIORAL HEALTH ASSESSMENT NOTE - RISK ASSESSMENT
. Low Acute Suicide Risk Low-No acute sx's, denies any S/H I/I/P, missed Prolixin yesterday (but will receive dose in ER), no acute psychotic sx's were elicited, patient does have chronic substance use, and used crack yesterday and does have h/o aggressive behavior which increase in chronic risk, however no reports of recent aggression, denies high psychic anxiety, denies global insomnia, denies panic attacks, and states he has a place to stay, followed by ACT team on AOT.

## 2020-04-28 NOTE — ED PROVIDER NOTE - PHYSICAL EXAMINATION
Alert, lucid, and in no apparent distress. Pt is normocephalic, atraumatic.  Pupils are equal, round, no dysmetria.   Lungs clear . Heart regular rate and rhythm, normal S1, S2, no murmurs, gallops, rubs.  Abdomen is soft, nontender, no pulsatile mass, no masses, no distension, no rebound. No CVA Tenderness, no suprapubic tenderness.   Non-focal sensory, 5 out of 5 motor strength, no dysmetria, fluent, goal directed speech.

## 2020-04-29 VITALS
RESPIRATION RATE: 18 BRPM | HEART RATE: 68 BPM | SYSTOLIC BLOOD PRESSURE: 100 MMHG | TEMPERATURE: 98 F | DIASTOLIC BLOOD PRESSURE: 60 MMHG | OXYGEN SATURATION: 96 %

## 2020-04-29 LAB — SARS-COV-2 RNA SPEC QL NAA+PROBE: DETECTED

## 2020-04-29 NOTE — ED ADULT NURSE REASSESSMENT NOTE - GENERAL PATIENT STATE
resting/sleeping
resting/sleeping
resting/sleeping/comfortable appearance
resting/sleeping/comfortable appearance/cooperative

## 2020-04-29 NOTE — ED ADULT NURSE REASSESSMENT NOTE - NS ED NURSE REASSESS COMMENT FT1
Patient sitting in chair, comfortable, no acute distress, resp even/unlabored, awaiting for transportation, will monitor patient, IV DCd.

## 2020-04-29 NOTE — ED BEHAVIORAL HEALTH NOTE - BEHAVIORAL HEALTH NOTE
Social work note:  met with pt who gave SW permission to call his mother, Abeba ([phone]) to go over covid+ results and discharge planning. Worker spoke with pt's mother and informed that pt is COVID+ and at this time, [place] has halted all evictions and at this time, pt can return home to self quarantine. Worker encouraged pt's mother to call the police if she is concerned for her safety when pt returns home. Pt's mother refuses to let pt return home stating there are other people in the home (his sister) and they cannot quarantine from him. Pt is currently sleeping in the living room and uses all shared spaces in the home. Pt's mother was tested positive for COVID 2 weeks ago and no longer has any symptoms. SW explained to pt's mother again that he can return home, went over isolation. Pt's mother asked worker to call [employer] as he is part of the ACT team and they will find him a hotel to stay in. Worker called [phone] and left a message for someone to call this worker back. Worker also left message for ACT team nurse. Pt is medically and psychiatrically cleared at this time. SW to follow

## 2020-04-29 NOTE — ED ADULT NURSE REASSESSMENT NOTE - NS ED NURSE REASSESS COMMENT FT1
Pt  received in bed 12 , awake and alert , medically and psych cleared , awaiting SW and dispo , will continue to monitor

## 2020-04-29 NOTE — SBIRT NOTE ADULT - NSSBIRTDRGPASSREFTXDET_GEN_A_CORE
Worker offered referral to outpatient treatment at this time due to the pandemic. Pt declined. Pt is involved with ACT team.

## 2020-04-29 NOTE — ED ADULT NURSE REASSESSMENT NOTE - NS ED NURSE REASSESS COMMENT FT1
pt awake and consumed breakfast 100%, pt made aware of his covid test results, pt un-domiciled at this time referred to  for emergency housing and moved to an isolated room until ready for discharge. pt offers no complaints of chills, cough, or sob at this time.

## 2020-04-29 NOTE — SBIRT NOTE ADULT - NSSBIRTALCPOSREINDET_GEN_A_CORE
met with pt and discussed substance use. Pt states he does not have an "issue" and can stop when he wants to. Pt reports he has not been involved with substance abuse resources and worker offered to pt, he declined.

## 2020-11-12 ENCOUNTER — EMERGENCY (EMERGENCY)
Facility: HOSPITAL | Age: 45
LOS: 1 days | End: 2020-11-12
Admitting: EMERGENCY MEDICINE
Payer: MEDICARE

## 2020-11-12 PROCEDURE — 99283 EMERGENCY DEPT VISIT LOW MDM: CPT

## 2021-01-20 ENCOUNTER — EMERGENCY (EMERGENCY)
Facility: HOSPITAL | Age: 46
LOS: 1 days | Discharge: OTHER INSTITUTION | End: 2021-01-20
Attending: EMERGENCY MEDICINE
Payer: MEDICARE

## 2021-01-20 VITALS
SYSTOLIC BLOOD PRESSURE: 150 MMHG | HEIGHT: 68 IN | DIASTOLIC BLOOD PRESSURE: 90 MMHG | OXYGEN SATURATION: 96 % | HEART RATE: 109 BPM | WEIGHT: 184.97 LBS | RESPIRATION RATE: 20 BRPM

## 2021-01-20 LAB
ALBUMIN SERPL ELPH-MCNC: 4.3 G/DL — SIGNIFICANT CHANGE UP (ref 3.3–5.2)
ALP SERPL-CCNC: 97 U/L — SIGNIFICANT CHANGE UP (ref 40–120)
ALT FLD-CCNC: 38 U/L — SIGNIFICANT CHANGE UP
ANION GAP SERPL CALC-SCNC: 9 MMOL/L — SIGNIFICANT CHANGE UP (ref 5–17)
APAP SERPL-MCNC: <3 UG/ML — LOW (ref 10–26)
AST SERPL-CCNC: 47 U/L — HIGH
BASOPHILS # BLD AUTO: 0.07 K/UL — SIGNIFICANT CHANGE UP (ref 0–0.2)
BASOPHILS NFR BLD AUTO: 0.8 % — SIGNIFICANT CHANGE UP (ref 0–2)
BILIRUB SERPL-MCNC: 0.3 MG/DL — LOW (ref 0.4–2)
BUN SERPL-MCNC: 27 MG/DL — HIGH (ref 8–20)
CALCIUM SERPL-MCNC: 9.2 MG/DL — SIGNIFICANT CHANGE UP (ref 8.6–10.2)
CHLORIDE SERPL-SCNC: 105 MMOL/L — SIGNIFICANT CHANGE UP (ref 98–107)
CO2 SERPL-SCNC: 26 MMOL/L — SIGNIFICANT CHANGE UP (ref 22–29)
CREAT SERPL-MCNC: 1.03 MG/DL — SIGNIFICANT CHANGE UP (ref 0.5–1.3)
EOSINOPHIL # BLD AUTO: 0.07 K/UL — SIGNIFICANT CHANGE UP (ref 0–0.5)
EOSINOPHIL NFR BLD AUTO: 0.8 % — SIGNIFICANT CHANGE UP (ref 0–6)
ETHANOL SERPL-MCNC: <10 MG/DL — HIGH (ref 0–9)
GLUCOSE SERPL-MCNC: 120 MG/DL — HIGH (ref 70–99)
HCT VFR BLD CALC: 43.8 % — SIGNIFICANT CHANGE UP (ref 39–50)
HGB BLD-MCNC: 15 G/DL — SIGNIFICANT CHANGE UP (ref 13–17)
IMM GRANULOCYTES NFR BLD AUTO: 0.3 % — SIGNIFICANT CHANGE UP (ref 0–1.5)
LYMPHOCYTES # BLD AUTO: 2.35 K/UL — SIGNIFICANT CHANGE UP (ref 1–3.3)
LYMPHOCYTES # BLD AUTO: 25.9 % — SIGNIFICANT CHANGE UP (ref 13–44)
MCHC RBC-ENTMCNC: 31.3 PG — SIGNIFICANT CHANGE UP (ref 27–34)
MCHC RBC-ENTMCNC: 34.2 GM/DL — SIGNIFICANT CHANGE UP (ref 32–36)
MCV RBC AUTO: 91.4 FL — SIGNIFICANT CHANGE UP (ref 80–100)
MONOCYTES # BLD AUTO: 0.8 K/UL — SIGNIFICANT CHANGE UP (ref 0–0.9)
MONOCYTES NFR BLD AUTO: 8.8 % — SIGNIFICANT CHANGE UP (ref 2–14)
NEUTROPHILS # BLD AUTO: 5.75 K/UL — SIGNIFICANT CHANGE UP (ref 1.8–7.4)
NEUTROPHILS NFR BLD AUTO: 63.4 % — SIGNIFICANT CHANGE UP (ref 43–77)
PLATELET # BLD AUTO: 336 K/UL — SIGNIFICANT CHANGE UP (ref 150–400)
POTASSIUM SERPL-MCNC: 4.3 MMOL/L — SIGNIFICANT CHANGE UP (ref 3.5–5.3)
POTASSIUM SERPL-SCNC: 4.3 MMOL/L — SIGNIFICANT CHANGE UP (ref 3.5–5.3)
PROT SERPL-MCNC: 7 G/DL — SIGNIFICANT CHANGE UP (ref 6.6–8.7)
RBC # BLD: 4.79 M/UL — SIGNIFICANT CHANGE UP (ref 4.2–5.8)
RBC # FLD: 13.1 % — SIGNIFICANT CHANGE UP (ref 10.3–14.5)
SALICYLATES SERPL-MCNC: <0.6 MG/DL — LOW (ref 10–20)
SODIUM SERPL-SCNC: 139 MMOL/L — SIGNIFICANT CHANGE UP (ref 135–145)
WBC # BLD: 9.07 K/UL — SIGNIFICANT CHANGE UP (ref 3.8–10.5)
WBC # FLD AUTO: 9.07 K/UL — SIGNIFICANT CHANGE UP (ref 3.8–10.5)

## 2021-01-20 PROCEDURE — 99285 EMERGENCY DEPT VISIT HI MDM: CPT

## 2021-01-20 PROCEDURE — 93010 ELECTROCARDIOGRAM REPORT: CPT

## 2021-01-20 PROCEDURE — 71045 X-RAY EXAM CHEST 1 VIEW: CPT | Mod: 26

## 2021-01-20 RX ADMIN — Medication 4 MILLIGRAM(S): at 21:30

## 2021-01-20 NOTE — ED PROVIDER NOTE - CLINICAL SUMMARY MEDICAL DECISION MAKING FREE TEXT BOX
Decompensated schizophrenia, required lorazepam for behavior control and safety, no apparent medical issues. Will need psychiatric evaluation and likely admission. Decompensated schizophrenia, required lorazepam for behavior control and safety. Reportedly inhaled "oven " which could possibly contain hydrocarbon or caustics depending on brand, but CXR has no signs of pulmonary edema or lung injury, pt has no apparent toxidrome, no signs of inhalational injury on exam, or caustic ingestion requiring treatment or further investigation. Will need psychiatric evaluation and likely admission.
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
## 2021-01-21 VITALS
RESPIRATION RATE: 18 BRPM | SYSTOLIC BLOOD PRESSURE: 112 MMHG | TEMPERATURE: 98 F | OXYGEN SATURATION: 95 % | DIASTOLIC BLOOD PRESSURE: 72 MMHG | HEART RATE: 82 BPM

## 2021-01-21 LAB
AMPHET UR-MCNC: NEGATIVE — SIGNIFICANT CHANGE UP
APPEARANCE UR: CLEAR — SIGNIFICANT CHANGE UP
BACTERIA # UR AUTO: NEGATIVE — SIGNIFICANT CHANGE UP
BARBITURATES UR SCN-MCNC: NEGATIVE — SIGNIFICANT CHANGE UP
BENZODIAZ UR-MCNC: NEGATIVE — SIGNIFICANT CHANGE UP
BILIRUB UR-MCNC: NEGATIVE — SIGNIFICANT CHANGE UP
COCAINE METAB.OTHER UR-MCNC: NEGATIVE — SIGNIFICANT CHANGE UP
COLOR SPEC: YELLOW — SIGNIFICANT CHANGE UP
DIFF PNL FLD: NEGATIVE — SIGNIFICANT CHANGE UP
EPI CELLS # UR: SIGNIFICANT CHANGE UP
GLUCOSE UR QL: NEGATIVE MG/DL — SIGNIFICANT CHANGE UP
KETONES UR-MCNC: NEGATIVE — SIGNIFICANT CHANGE UP
LEUKOCYTE ESTERASE UR-ACNC: NEGATIVE — SIGNIFICANT CHANGE UP
METHADONE UR-MCNC: NEGATIVE — SIGNIFICANT CHANGE UP
NITRITE UR-MCNC: NEGATIVE — SIGNIFICANT CHANGE UP
OPIATES UR-MCNC: NEGATIVE — SIGNIFICANT CHANGE UP
PCP SPEC-MCNC: SIGNIFICANT CHANGE UP
PCP UR-MCNC: NEGATIVE — SIGNIFICANT CHANGE UP
PH UR: 6 — SIGNIFICANT CHANGE UP (ref 5–8)
PROT UR-MCNC: 15 MG/DL
RBC CASTS # UR COMP ASSIST: SIGNIFICANT CHANGE UP /HPF (ref 0–4)
SARS-COV-2 RNA SPEC QL NAA+PROBE: SIGNIFICANT CHANGE UP
SP GR SPEC: 1.02 — SIGNIFICANT CHANGE UP (ref 1.01–1.02)
THC UR QL: NEGATIVE — SIGNIFICANT CHANGE UP
UROBILINOGEN FLD QL: NEGATIVE MG/DL — SIGNIFICANT CHANGE UP
WBC UR QL: SIGNIFICANT CHANGE UP

## 2021-01-21 PROCEDURE — 96372 THER/PROPH/DIAG INJ SC/IM: CPT | Mod: XU

## 2021-01-21 PROCEDURE — 85025 COMPLETE CBC W/AUTO DIFF WBC: CPT

## 2021-01-21 PROCEDURE — 71045 X-RAY EXAM CHEST 1 VIEW: CPT

## 2021-01-21 PROCEDURE — 96374 THER/PROPH/DIAG INJ IV PUSH: CPT

## 2021-01-21 PROCEDURE — 81001 URINALYSIS AUTO W/SCOPE: CPT

## 2021-01-21 PROCEDURE — U0003: CPT

## 2021-01-21 PROCEDURE — 80053 COMPREHEN METABOLIC PANEL: CPT

## 2021-01-21 PROCEDURE — 36415 COLL VENOUS BLD VENIPUNCTURE: CPT

## 2021-01-21 PROCEDURE — 99220: CPT | Mod: CS

## 2021-01-21 PROCEDURE — G0378: CPT

## 2021-01-21 PROCEDURE — 93005 ELECTROCARDIOGRAM TRACING: CPT

## 2021-01-21 PROCEDURE — 99285 EMERGENCY DEPT VISIT HI MDM: CPT

## 2021-01-21 PROCEDURE — U0005: CPT

## 2021-01-21 PROCEDURE — 99285 EMERGENCY DEPT VISIT HI MDM: CPT | Mod: 25

## 2021-01-21 PROCEDURE — 80307 DRUG TEST PRSMV CHEM ANLYZR: CPT

## 2021-01-21 RX ORDER — DIPHENHYDRAMINE HCL 50 MG
50 CAPSULE ORAL EVERY 6 HOURS
Refills: 0 | Status: DISCONTINUED | OUTPATIENT
Start: 2021-01-21 | End: 2021-01-25

## 2021-01-21 RX ORDER — HALOPERIDOL DECANOATE 100 MG/ML
5 INJECTION INTRAMUSCULAR EVERY 6 HOURS
Refills: 0 | Status: DISCONTINUED | OUTPATIENT
Start: 2021-01-21 | End: 2021-01-25

## 2021-01-21 RX ADMIN — Medication 50 MILLIGRAM(S): at 17:20

## 2021-01-21 RX ADMIN — HALOPERIDOL DECANOATE 5 MILLIGRAM(S): 100 INJECTION INTRAMUSCULAR at 17:20

## 2021-01-21 RX ADMIN — Medication 2 MILLIGRAM(S): at 17:20

## 2021-01-21 NOTE — ED CDU PROVIDER DISPOSITION NOTE - CLINICAL COURSE
Pt presented with psychosis secondary to schizophrenia requiring psychiatric admission for stabilization and treatment.

## 2021-01-21 NOTE — ED BEHAVIORAL HEALTH ASSESSMENT NOTE - PAST PSYCHOTROPIC MEDICATION
As of April 28, 2020: Prolixin 2.5 mg daily, Seroquel 100 mg at night, Cogentin 1 mg BID, Depakote 500 mg in am and 1000 mg HS, Prolixin decanoate 50 mg Q two weeks

## 2021-01-21 NOTE — ED ADULT NURSE NOTE - OBJECTIVE STATEMENT
Assumed care of patient at 0725, patient is resting in bed at this time in a yellow gown. Pt appears to be sleeping, respirations are even and non labored.
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
## 2021-01-21 NOTE — ED BEHAVIORAL HEALTH ASSESSMENT NOTE - RISK ASSESSMENT
Low-No acute sx's, denies any S/H I/I/P, missed Prolixin yesterday (but will receive dose in ER), no acute psychotic sx's were elicited, patient does have chronic substance use, and used crack yesterday and does have h/o aggressive behavior which increase in chronic risk, however no reports of recent aggression, denies high psychic anxiety, denies global insomnia, denies panic attacks, and states he has a place to stay, followed by ACT team on AOT. Low Acute Suicide Risk High- acute symptoms of HI and violent behavior. patient does have chronic substance abuse history and does have h/o aggressive behavior which increase in chronic risk.

## 2021-01-21 NOTE — CHART NOTE - NSCHARTNOTEFT_GEN_A_CORE
JULIO Note: Per psych team, pt is in need of inpt psych trx on involuntary status. Pt is covid negative and medically cleared. JULIO placed call to [facility], spoke to Nicole who reported no adult beds at this time. JULIO placed call to [facility], spoke to Chelsea who stated no male beds for today. JULIO placed call to [facility], spoke to Mitzi who reported male beds are available, advised this writer to fax referral to [phone]. Referral faxed via [facility], referral currently under review. JULIO will continue to follow for safe/appropriate trx.

## 2021-01-21 NOTE — ED ADULT NURSE REASSESSMENT NOTE - REASSESS COMMUNICATION
received pt to  unit, pt reporting he feels more terrible after getting 24 hours of sleep at the hospital as apposed to the 3 minute intermittent sleeps he gets while living on the streets for the past 2 weeks. pt provided with lunch tray, given urine specimen cup with explanation to collect a sample when able too.

## 2021-01-21 NOTE — ED BEHAVIORAL HEALTH ASSESSMENT NOTE - PSYCHIATRIC ISSUES AND PLAN (INCLUDE STANDING AND PRN MEDICATION)
Schizophrenia, violent behavior - Ativan 4mg IV, push, once at 9:00PM on 1/20/21 would benefit from antipsychotic meds including long acting injectable

## 2021-01-21 NOTE — ED CDU PROVIDER INITIAL DAY NOTE - PROGRESS NOTE DETAILS
Pt awaiting acceptance to [facility] for psychiatric admission. Pt with no signs of any delayed lung injury, caustic injury or toxic ingestion and is medically clear for transfer.
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
## 2021-01-21 NOTE — ED BEHAVIORAL HEALTH ASSESSMENT NOTE - DESCRIPTION
Denies Homeless T(C): 36.7 (20 Jan 2021 23:38), Max: 36.7 (20 Jan 2021 23:38)  T(F): 98.1 (20 Jan 2021 23:38), Max: 98.1 (20 Jan 2021 23:38)  HR: 83 (21 Jan 2021 07:23) (82 - 109)  BP: 117/57 (21 Jan 2021 07:23) (116/74 - 150/90)  BP(mean): --  RR: 18 (21 Jan 2021 07:23) (16 - 20)  SpO2: 94% (21 Jan 2021 07:23) (92% - 96%)               15.0   9.07  )-----------( 336      ( 20 Jan 2021 21:37 )             43.8     01-20    139  |  105  |  27.0<H>  ----------------------------<  120<H>  4.3   |  26.0  |  1.03    Ca    9.2      20 Jan 2021 21:37  TPro  7.0  /  Alb  4.3  /  TBili  0.3<L>  /  DBili  x   /  AST  47<H>  /  ALT  38  /  AlkPhos  97  01-20
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
## 2021-01-21 NOTE — ED BEHAVIORAL HEALTH ASSESSMENT NOTE - OTHER
ACT Team Unable to assess due to pt being uncooperative Pt laying in bed billing in PK Homeless Sister
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
## 2021-01-21 NOTE — ED BEHAVIORAL HEALTH ASSESSMENT NOTE - VIOLENCE RISK FACTORS:
Violent ideation/threat/speech/Substance abuse/Impulsivity/Noncompliance with treatment/Community stressors that increase the risk of destabilization

## 2021-01-22 ENCOUNTER — INPATIENT (INPATIENT)
Facility: HOSPITAL | Age: 46
LOS: 41 days | Discharge: NURSING, REHAB OR LONG-TERM CARE | End: 2021-03-05
Attending: PSYCHIATRY & NEUROLOGY | Admitting: PSYCHIATRY & NEUROLOGY
Payer: MEDICARE

## 2021-01-22 VITALS — TEMPERATURE: 97 F

## 2021-01-22 DIAGNOSIS — F33.9 MAJOR DEPRESSIVE DISORDER, RECURRENT, UNSPECIFIED: ICD-10-CM

## 2021-01-22 RX ORDER — CHLORPROMAZINE HCL 10 MG
100 TABLET ORAL EVERY 4 HOURS
Refills: 0 | Status: DISCONTINUED | OUTPATIENT
Start: 2021-01-22 | End: 2021-03-05

## 2021-01-22 RX ORDER — CHLORPROMAZINE HCL 10 MG
100 TABLET ORAL ONCE
Refills: 0 | Status: DISCONTINUED | OUTPATIENT
Start: 2021-01-22 | End: 2021-03-05

## 2021-01-22 RX ORDER — DIPHENHYDRAMINE HCL 50 MG
50 CAPSULE ORAL EVERY 6 HOURS
Refills: 0 | Status: DISCONTINUED | OUTPATIENT
Start: 2021-01-22 | End: 2021-01-25

## 2021-01-22 RX ORDER — QUETIAPINE FUMARATE 200 MG/1
300 TABLET, FILM COATED ORAL AT BEDTIME
Refills: 0 | Status: DISCONTINUED | OUTPATIENT
Start: 2021-01-22 | End: 2021-01-28

## 2021-01-22 RX ORDER — PALIPERIDONE 1.5 MG/1
6 TABLET, EXTENDED RELEASE ORAL AT BEDTIME
Refills: 0 | Status: DISCONTINUED | OUTPATIENT
Start: 2021-01-22 | End: 2021-01-27

## 2021-01-22 RX ORDER — DIPHENHYDRAMINE HCL 50 MG
50 CAPSULE ORAL ONCE
Refills: 0 | Status: DISCONTINUED | OUTPATIENT
Start: 2021-01-22 | End: 2021-01-25

## 2021-01-22 RX ORDER — FLUPHENAZINE HYDROCHLORIDE 1 MG/1
5 TABLET, FILM COATED ORAL ONCE
Refills: 0 | Status: DISCONTINUED | OUTPATIENT
Start: 2021-01-22 | End: 2021-01-25

## 2021-01-22 RX ORDER — LANOLIN ALCOHOL/MO/W.PET/CERES
5 CREAM (GRAM) TOPICAL AT BEDTIME
Refills: 0 | Status: DISCONTINUED | OUTPATIENT
Start: 2021-01-22 | End: 2021-02-23

## 2021-01-22 RX ORDER — BENZTROPINE MESYLATE 1 MG
2 TABLET ORAL AT BEDTIME
Refills: 0 | Status: DISCONTINUED | OUTPATIENT
Start: 2021-01-22 | End: 2021-03-05

## 2021-01-22 RX ORDER — FLUPHENAZINE HYDROCHLORIDE 1 MG/1
5 TABLET, FILM COATED ORAL EVERY 6 HOURS
Refills: 0 | Status: DISCONTINUED | OUTPATIENT
Start: 2021-01-22 | End: 2021-01-25

## 2021-01-22 RX ADMIN — Medication 2 MILLIGRAM(S): at 11:02

## 2021-01-22 RX ADMIN — QUETIAPINE FUMARATE 300 MILLIGRAM(S): 200 TABLET, FILM COATED ORAL at 21:31

## 2021-01-22 RX ADMIN — Medication 100 MILLIGRAM(S): at 21:41

## 2021-01-22 RX ADMIN — Medication 5 MILLIGRAM(S): at 21:31

## 2021-01-22 RX ADMIN — Medication 100 MILLIGRAM(S): at 11:01

## 2021-01-22 RX ADMIN — Medication 2 MILLIGRAM(S): at 21:31

## 2021-01-22 RX ADMIN — Medication 50 MILLIGRAM(S): at 21:42

## 2021-01-22 RX ADMIN — Medication 2 MILLIGRAM(S): at 21:42

## 2021-01-22 RX ADMIN — PALIPERIDONE 6 MILLIGRAM(S): 1.5 TABLET, EXTENDED RELEASE ORAL at 21:30
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
## 2021-01-22 NOTE — BH SOCIAL WORK INITIAL PSYCHOSOCIAL EVALUATION - NSPROPTRIGHTSUPPORTPERSON_GEN_A_NUR
Chief Complaint   Patient presents with   • Numbness     Pt is experiencing finger numbness on both hands for past 2 weeks, no pain. Last Oct could not move neck or arm on left side, lasted about 30 seconds. Wonders if that is at all related to numbness in fingers.   • Dizziness     Pt almost fainted at work 2 weeks ago, no recurrence.         SUBJECTIVE:     HISTORY OF PRESENT ILLNESS:  Sky Mathew is a 20 year old male who presents today with a chief complaint of numbness and tingling in both of his hands x2 weeks. Pt states the numbness and tingling are located on the palmar side of his b/l hands, located primarily in the first, second, third, and part of the 4th digit. He also experiences occasional \"shocks\" with certain hand positions.     Pt does not recall any inciting injury. 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 Reports no accidents or spills onto his skin. He does do a lot of twisting motions, screwing on caps and performing other manual labors. Pt also lifts weights, especially biceps and triceps workouts, but does not do other repetitive motions such as typing on a keyboard.     Pt also reports that around 2 weeks ago, he was working the opening shift at [employer] (around 4:00am) and he experienced an episode of dizziness, diaphoresis, and confusion. His co-workers heard that regular coke with lime could help, so they provided that to him and pt reports this resolved his symptoms. These symptoms have not recurred since then and had never happened before that. He ate breakfast later that day at work, generally does not skip meals.        PAST MEDICAL HISTORY:  History reviewed. No pertinent past medical history.    PAST SURGICAL HISTORY:  Past Surgical History:   Procedure Laterality Date   • No past surgeries         FAMILY HISTORY:  Family History   Problem Relation Age of Onset   • Hypertension Mother    • Patient is unaware of any medical problems Father    • Patient  is unaware of any medical problems Sister        SOCIAL HISTORY:  Social History     Tobacco Use   • Smoking status: Never Smoker   • Smokeless tobacco: Never Used   Substance Use Topics   • Alcohol use: Not on file   • Drug use: Not on file       ALLERGIES:  ALLERGIES:  No Known Allergies    MEDICATIONS:  Current Outpatient Medications   Medication Sig Dispense Refill   • triamcinolone (ARISTOCORT) 0.1 % ointment Apply 1 application topically 2 times daily. 30 g 2     No current facility-administered medications for this visit.        Review of Systems   Constitutional: Negative for chills, fatigue and fever.   HENT: Negative.    Eyes: Negative for visual disturbance.   Respiratory: Negative for cough, chest tightness and shortness of breath.    Cardiovascular: Negative for chest pain.   Gastrointestinal: Negative for abdominal pain, constipation, diarrhea, nausea and vomiting.   Genitourinary: Negative.    Musculoskeletal: Negative for arthralgias, joint swelling, myalgias, neck pain and neck stiffness.   Skin: Negative for rash.   Neurological: Positive for dizziness (x1 episode), light-headedness (x1 episode) and numbness (b/l hands). Negative for weakness and headaches.   Psychiatric/Behavioral: Negative for dysphoric mood, self-injury and suicidal ideas. The patient is not nervous/anxious.        OBJECTIVE:     Visit Vitals  /79 (BP Location: RUE - Right upper extremity, Patient Position: Sitting, Cuff Size: Regular)   Pulse (!) 58   Temp 98.9 °F (37.2 °C) (Temporal)   Resp 16   Ht 5' 2.89\" (1.597 m)   Wt 78.2 kg (172 lb 8 oz)   BMI 30.66 kg/m²       Physical Exam  Constitutional:       General: He is not in acute distress.     Appearance: Normal appearance.   HENT:      Head: Normocephalic and atraumatic.      Right Ear: Tympanic membrane normal.      Left Ear: Tympanic membrane normal.      Nose: Nose normal. No rhinorrhea.      Mouth/Throat:      Mouth: Mucous membranes are moist.      Pharynx:  Oropharynx is clear. No oropharyngeal exudate or posterior oropharyngeal erythema.   Eyes:      Extraocular Movements: Extraocular movements intact.      Conjunctiva/sclera: Conjunctivae normal.      Pupils: Pupils are equal, round, and reactive to light.   Neck:      Musculoskeletal: Normal range of motion and neck supple.   Cardiovascular:      Rate and Rhythm: Normal rate and regular rhythm.      Pulses: Normal pulses.      Heart sounds: Normal heart sounds. No murmur.   Pulmonary:      Effort: Pulmonary effort is normal.      Breath sounds: Normal breath sounds. No wheezing, rhonchi or rales.   Abdominal:      General: Abdomen is flat.      Palpations: Abdomen is soft.      Tenderness: There is no abdominal tenderness. There is no guarding.      Hernia: No hernia is present.   Musculoskeletal: Normal range of motion.         General: No deformity.      Right wrist: He exhibits no tenderness, no bony tenderness and no swelling.      Left wrist: He exhibits no tenderness, no bony tenderness and no swelling.      Right hand: Decreased sensation noted. Decreased sensation is present in the medial distribution. Normal strength noted.      Left hand: Decreased sensation noted. Decreased sensation is present in the medial distribution. Normal strength noted.        Hands:    Skin:     General: Skin is warm and dry.      Capillary Refill: Capillary refill takes less than 2 seconds.      Findings: No lesion or rash.   Neurological:      General: No focal deficit present.      Mental Status: He is alert and oriented to person, place, and time.      Cranial Nerves: Cranial nerves are intact.      Sensory: Sensation is intact.      Motor: Motor function is intact.      Deep Tendon Reflexes:      Reflex Scores:       Brachioradialis reflexes are 2+ on the right side and 2+ on the left side.       Patellar reflexes are 2+ on the right side and 2+ on the left side.     Comments: Strength 5/5 in the b/l UE and LE. Sensation  intact in the b/l UE and LE. Sensation decreased but equal along the median nerve distribution of the b/l hands. B/l Carpal Tunnel hypertonic, but non-tender to palpation.    Psychiatric:         Mood and Affect: Mood normal.         Behavior: Behavior normal.         Thought Content: Thought content normal.           ASSESSMENT AND PLAN:     Problem List Items Addressed This Visit        Nervous    Bilateral carpal tunnel syndrome - Primary     - Pt with numbness/tingling along the median nerve distribution, with positive Phalen's test and positive left Tinnel's test  - Pt advised to keep his wrist in a neutral position as much as possible  - Pt education handouts provided  - Advised pt try wrist splinting at night, avoiding exercises that may bend his wrist (I.e. weight lifting)  - Informed pt there is no \"quick fix,\" but to reach out if symptoms persist or worsen.            Other Visit Diagnoses     Pre-syncope        - Per pt, one episode 2 weeks ago at AM shift at work  - Sx resolved with regular Coke + Lime  - BG checked today nl at 103  - Advised pt to stay hydrated    Relevant Orders    POCT BLOOD SUGAR HAND HELD DEVICE (Completed)    Elevated blood pressure reading without diagnosis of hypertension        - BP x2 elevated to 130's / 80's  - Advised pt to increase cardiovascular exercise, decrease fatty foods  - Recheck at next visit          Return if symptoms worsen or fail to improve.    Instructions provided as documented in the AVS.    The patient indicated understanding of the diagnosis, agreed with the plan of care and agreed to call or return with any new or worsening symptoms.    Discussed with Dr. Tim Mcfarland DO  PGY-2  Family Medicine  07/07/20     declines

## 2021-01-22 NOTE — BH INPATIENT PSYCHIATRY ASSESSMENT NOTE - DESCRIPTION
Dr. Larson can you change rx on med list. Rx says 1 tab tid before meals but note states 3/3/4. Please advise.    per HPI
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
## 2021-01-22 NOTE — BH SOCIAL WORK INITIAL PSYCHOSOCIAL EVALUATION - NSPTSTATEDGOAL_PSY_ALL_CORE
Patient was sleeping in the quiet room when SW attempted to meet with him.    Patient will comply with treatment and be in good behavioral control on the unit.
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
## 2021-01-22 NOTE — BH SOCIAL WORK INITIAL PSYCHOSOCIAL EVALUATION - NSCMSPTSTRENGTHS_PSY_ALL_CORE
Patient was sleeping in the quiet room when SW attempted to meet with him./Unable to obtain (specify)

## 2021-01-22 NOTE — BH SOCIAL WORK INITIAL PSYCHOSOCIAL EVALUATION - NSBHCOGDIS_PSY_ALL_CORE
Patient was sleeping in the quiet room when SW attempted to meet with him./Unable to answer (specify)

## 2021-01-22 NOTE — BH INPATIENT PSYCHIATRY ASSESSMENT NOTE - NSSUICRSKFACTOR_PSY_ALL_CORE
Current and Past Psychiatric Diagnoses/Presenting Symptoms/Historical Factors/Activating Events/Stressors

## 2021-01-22 NOTE — BH INPATIENT PSYCHIATRY ASSESSMENT NOTE - CURRENT MEDICATION
MEDICATIONS  (STANDING):    MEDICATIONS  (PRN):  diphenhydrAMINE 50 milliGRAM(s) Oral every 6 hours PRN agitation/eps ppx  diphenhydrAMINE   Injectable 50 milliGRAM(s) IntraMuscular once PRN severe agitation/eps ppx  fluPHENAZine 5 milliGRAM(s) Oral every 6 hours PRN agitation  fluPHENAZine  Injectable 5 milliGRAM(s) IntraMuscular once PRN severe agitation  LORazepam     Tablet 2 milliGRAM(s) Oral every 6 hours PRN agitation  LORazepam     Tablet 2 milliGRAM(s) Oral every 2 hours PRN CIWA score increase by 2 points and current CIWA score GREATER THAN 9  LORazepam   Injectable 2 milliGRAM(s) IntraMuscular once PRN severe agitation

## 2021-01-22 NOTE — BH INPATIENT PSYCHIATRY ASSESSMENT NOTE - NSBHCHARTREVIEWLAB_PSY_A_CORE FT
15.0   9.07  )-----------( 336      ( 2021 21:37 )             43.8         139  |  105  |  27.0<H>  ----------------------------<  120<H>  4.3   |  26.0  |  1.03    Ca    9.2      2021 21:37    TPro  7.0  /  Alb  4.3  /  TBili  0.3<L>  /  DBili  x   /  AST  47<H>  /  ALT  38  /  AlkPhos  97      Urinalysis Basic - ( 2021 12:29 )    Color: Yellow / Appearance: Clear / S.025 / pH: x  Gluc: x / Ketone: Negative  / Bili: Negative / Urobili: Negative mg/dL   Blood: x / Protein: 15 mg/dL / Nitrite: Negative   Leuk Esterase: Negative / RBC: 0-2 /HPF / WBC 3-5   Sq Epi: x / Non Sq Epi: Occasional / Bacteria: Negative
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
## 2021-01-22 NOTE — BH INPATIENT PSYCHIATRY ASSESSMENT NOTE - NSBHCHARTREVIEWINVESTIGATE_PSY_A_CORE FT
< from: Xray Chest 1 View- PORTABLE-Urgent (Xray Chest 1 View- PORTABLE-Urgent .) (01.20.21 @ 22:42) >    PROCEDURE DATE:  01/20/2021          INTERPRETATION:  TECHNIQUE: Single portable view of the chest.    COMPARISON: None.    CLINICAL HISTORY: inhalation    FINDINGS:    Single frontal view of the chest demonstrates the lungs to be clear. The cardiomediastinal silhouette is normal. No acute osseous abnormalities. Consider CT chest as clinically warranted    IMPRESSION: No acute cardiopulmonary disease process.    < end of copied text >     < from: Xray Chest 1 View- PORTABLE-Urgent (Xray Chest 1 View- PORTABLE-Urgent .) (01.20.21 @ 22:42) >    PROCEDURE DATE:  01/20/2021          INTERPRETATION:  TECHNIQUE: Single portable view of the chest.    COMPARISON: None.    CLINICAL HISTORY: inhalation    FINDINGS:    Single frontal view of the chest demonstrates the lungs to be clear. The cardiomediastinal silhouette is normal. No acute osseous abnormalities. Consider CT chest as clinically warranted    IMPRESSION: No acute cardiopulmonary disease process.    < end of copied text >    < from: 12 Lead ECG (01.20.21 @ 23:22) >    Ventricular Rate 90 BPM    Atrial Rate 90 BPM    P-R Interval 150 ms    QRS Duration 100 ms    Q-T Interval 360 ms    QTC Calculation(Bazett) 440 ms    P Axis 67 degrees    R Axis -47 degrees    T Axis 59 degrees    Diagnosis Line Normal sinus rhythm  Left axis deviation    < end of copied text >

## 2021-01-22 NOTE — BH INPATIENT PSYCHIATRY ASSESSMENT NOTE - NSDCCRITERIA_PSY_ALL_CORE
Stabilization of psychotic symptoms such that patient is no longer presenting as a danger to others.

## 2021-01-22 NOTE — BH PATIENT PROFILE - NSPROEDALEARNPREFOTH_GEN_A_NUR
audio/computer/internet/group instruction/individual instruction/verbal instruction/written material
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
## 2021-01-22 NOTE — BH INPATIENT PSYCHIATRY ASSESSMENT NOTE - NSBHMETABOLIC_PSY_ALL_CORE_FT
BMI: BMI (kg/m2): 28.1 (01-20-21 @ 21:08)  HbA1c:   Glucose:   BP: --  Lipid Panel:  BMI: BMI (kg/m2): 28.1 (01-20-21 @ 21:08)
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
## 2021-01-22 NOTE — BH INPATIENT PSYCHIATRY ASSESSMENT NOTE - NSBHCHARTREVIEWVS_PSY_A_CORE FT
Vital Signs Last 24 Hrs  T(C): 36.4 (21 Jan 2021 23:06), Max: 37.2 (21 Jan 2021 11:13)  T(F): 97.5 (21 Jan 2021 23:06), Max: 99 (21 Jan 2021 11:13)  HR: 82 (21 Jan 2021 23:06) (82 - 98)  BP: 112/72 (21 Jan 2021 23:06) (112/72 - 120/78)  BP(mean): --  RR: 18 (21 Jan 2021 23:06) (18 - 19)  SpO2: 95% (21 Jan 2021 23:06) (93% - 95%)

## 2021-01-23 PROCEDURE — 99232 SBSQ HOSP IP/OBS MODERATE 35: CPT

## 2021-01-23 RX ADMIN — Medication 2 MILLIGRAM(S): at 15:08

## 2021-01-23 RX ADMIN — Medication 100 MILLIGRAM(S): at 15:08

## 2021-01-23 RX ADMIN — Medication 100 MILLIGRAM(S): at 09:05

## 2021-01-23 RX ADMIN — QUETIAPINE FUMARATE 300 MILLIGRAM(S): 200 TABLET, FILM COATED ORAL at 20:32

## 2021-01-23 RX ADMIN — Medication 50 MILLIGRAM(S): at 09:04

## 2021-01-23 RX ADMIN — Medication 2 MILLIGRAM(S): at 09:04

## 2021-01-23 RX ADMIN — Medication 50 MILLIGRAM(S): at 15:07

## 2021-01-23 RX ADMIN — PALIPERIDONE 6 MILLIGRAM(S): 1.5 TABLET, EXTENDED RELEASE ORAL at 20:33

## 2021-01-23 RX ADMIN — Medication 2 MILLIGRAM(S): at 20:33

## 2021-01-23 RX ADMIN — Medication 5 MILLIGRAM(S): at 20:33

## 2021-01-23 NOTE — BH INPATIENT PSYCHIATRY PROGRESS NOTE - NSBHASSESSSUMMFT_PSY_ALL_CORE
>Obs: Routine, no current SI. no need for CO, patient not expected to pose risk to self or others in controlled inpatient setting  >Psychiatric Meds: Continue with current medication regimen. Continue to observe for tolerability and efficacy.  >Social: milieu therapy  >Treatment: Group therapy and individual therapy, coping skills development for emotion management and mood regulation. Motivational counseling for substance abuse related issues.   >Continue to provide therapeutic interventions in support of treatment goals.  >Dispo: Continues to require inpatient level of care for treatment of psychotic symptoms. Upon improvement in patients functioning the patient will be appropriate for step down to the outpatient level of care.

## 2021-01-23 NOTE — BH INPATIENT PSYCHIATRY PROGRESS NOTE - NSBHFUPINTERVALHXFT_PSY_A_CORE
Patient is followed up for psychosis. Chart, medications and labs reviewed.  Patient is discussed with nursing staff. No significant overnight issues.  No appreciated change in status today. Patient remains disorganized, with ongoing perceptual disturbances. Psychotic symptoms successfully elicited; talking to self, RIS, appears internally preoccupied, and disorganized. Patient is irritable during interview reports “I feel worse by looking at you.”  Patient is intrusive, demanding to see paper in writer’s hand.  Jumped on table and sat on table repeatedly telling writer “let’s work on this together.”  No mention of sleep disturbances or appetite concerns.  Remains compliant with medications, no reported or observed adverse effects.  No akithisia, EPS, or tremors. Encouraged patient to continue medication regimen.  Self- care remains an issue.  Per nursing patient improved sleep last night.

## 2021-01-24 PROCEDURE — 99232 SBSQ HOSP IP/OBS MODERATE 35: CPT

## 2021-01-24 RX ORDER — IBUPROFEN 200 MG
400 TABLET ORAL ONCE
Refills: 0 | Status: COMPLETED | OUTPATIENT
Start: 2021-01-24 | End: 2021-01-24

## 2021-01-24 RX ADMIN — PALIPERIDONE 6 MILLIGRAM(S): 1.5 TABLET, EXTENDED RELEASE ORAL at 20:38

## 2021-01-24 RX ADMIN — Medication 5 MILLIGRAM(S): at 21:20

## 2021-01-24 RX ADMIN — Medication 400 MILLIGRAM(S): at 03:43

## 2021-01-24 RX ADMIN — Medication 2 MILLIGRAM(S): at 21:13

## 2021-01-24 RX ADMIN — Medication 2 MILLIGRAM(S): at 08:15

## 2021-01-24 RX ADMIN — QUETIAPINE FUMARATE 300 MILLIGRAM(S): 200 TABLET, FILM COATED ORAL at 21:20

## 2021-01-24 RX ADMIN — Medication 50 MILLIGRAM(S): at 20:38

## 2021-01-24 RX ADMIN — Medication 50 MILLIGRAM(S): at 08:15

## 2021-01-24 NOTE — BH INPATIENT PSYCHIATRY PROGRESS NOTE - NSBHFUPINTERVALHXFT_PSY_A_CORE
Patient is followed up for psychosis. Chart, medications and labs reviewed.  Patient is discussed with nursing staff. No significant overnight issues.  No appreciated change in status today. Patient remains disorganized, with ongoing perceptual disturbances. Psychotic symptoms successfully elicited; bizarre, talking to self, RIS, appears internally preoccupied, pacing and disorganized. Patient is irritable during interview, nonsensical, rambling speech.  Patient is intrusive, demanding to see paper in writer’s hand, but not physically aggressive.  No mention of sleep disturbances.  No appetite concerns.  Remains compliant with medications, no reported or observed adverse effects.  No new SE. Encouraged patient to continue medication regimen.  Self- care remains an issue.   Patient is followed up for psychosis. Chart, medications and labs reviewed.  Patient is discussed with nursing staff. No significant overnight issues.  No appreciated change in status today. Patient remains disorganized, with ongoing perceptual disturbances. Psychotic symptoms successfully elicited; bizarre, talking to self, RIS, appears internally preoccupied, pacing and disorganized. Patient is irritable during interview, nonsensical, rambling speech. Patient asked writer "do you want to repeat a curse to see how people react."  Patient is intrusive, demanding to see paper in writer’s hand, but not physically aggressive.  No mention of sleep disturbances.  No appetite concerns.  Remains compliant with medications, no reported or observed adverse effects.  No new SE. Encouraged patient to continue medication regimen.  Self- care remains an issue.

## 2021-01-25 PROCEDURE — 99232 SBSQ HOSP IP/OBS MODERATE 35: CPT

## 2021-01-25 RX ORDER — FLUPHENAZINE HYDROCHLORIDE 1 MG/1
5 TABLET, FILM COATED ORAL ONCE
Refills: 0 | Status: COMPLETED | OUTPATIENT
Start: 2021-01-25 | End: 2021-01-25

## 2021-01-25 RX ORDER — DIVALPROEX SODIUM 500 MG/1
1000 TABLET, DELAYED RELEASE ORAL AT BEDTIME
Refills: 0 | Status: DISCONTINUED | OUTPATIENT
Start: 2021-01-25 | End: 2021-01-28

## 2021-01-25 RX ORDER — DIPHENHYDRAMINE HCL 50 MG
50 CAPSULE ORAL EVERY 6 HOURS
Refills: 0 | Status: DISCONTINUED | OUTPATIENT
Start: 2021-01-25 | End: 2021-03-05

## 2021-01-25 RX ADMIN — FLUPHENAZINE HYDROCHLORIDE 5 MILLIGRAM(S): 1 TABLET, FILM COATED ORAL at 10:02

## 2021-01-25 RX ADMIN — Medication 100 MILLIGRAM(S): at 13:20

## 2021-01-25 RX ADMIN — Medication 3 MILLIGRAM(S): at 13:20

## 2021-01-25 RX ADMIN — Medication 2 MILLIGRAM(S): at 09:12

## 2021-01-25 RX ADMIN — Medication 50 MILLIGRAM(S): at 09:12

## 2021-01-25 RX ADMIN — Medication 100 MILLIGRAM(S): at 09:12

## 2021-01-25 RX ADMIN — Medication 1 MILLIGRAM(S): at 10:02

## 2021-01-25 NOTE — BH INPATIENT PSYCHIATRY PROGRESS NOTE - NSBHFUPINTERVALHXFT_PSY_A_CORE
Pt compliant with medication and tolerating it well.  Pt has required multiple PO PRN medications for agitation and intrusiveness.  Pt loud on the unit with pressured speech, talking nonsensically.  Pt remains disorganized, tangential, illogical.  Pt reports noncompliance with medication for 7 days prior to admission.  Reports he was on Ativan and Risperdal.  Pt gives verbal consent to speak with his ACT team.  Pt denies AH/VH, reporting, "I only have the thoughts God gave me."  Pt appears internally preoccupied.  Pt denies SI/HI/I/P.  Pt with grandiose delusions, reporting, "I am rich."  When asked how he made his money, he reports "the  wrote me a check."  Pt also reports, "my ACT team owes me several checks for 20 thousand dollars."  Pt reports poor sleep and appetite.  Pt with poor self care. Pt compliant with medication and tolerating it well.  Pt has required multiple PO PRN medications for agitation and intrusiveness.  Pt loud on the unit with pressured speech, talking nonsensically.  Pt remains disorganized, tangential, illogical.  Pt reports noncompliance with medication for 7 days prior to admission.  Reports he was on Ativan and Risperdal.  Pt gives verbal consent to speak with his ACT team.  Pt denies AH/VH, reporting, "I only have the thoughts God gave me."  Pt appears internally preoccupied.  Pt denies SI/HI/I/P.  Pt with grandiose delusions, reporting, "I am rich."  When asked how he made his money, he reports "the  wrote me a check."  Pt also reports, "my ACT team owes me several checks for 20 thousand dollars."  Pt reports poor sleep and appetite.  Pt with poor self care.    Spoke with Dr. Matthew [phone] from patient's [facility] ACT team who reported patient is on AOT as well and has had multiple hospitalizations "every other week."  Pt reports when he is discharged he uses cocaine, becomes agitated and is readmitted.  Pt reports he did not do well on Abilify Maintaina which he was most recently on after discharge from [facility].  He reports patient did well previously on Invega Sustenna, Prolixin Decanoate and Haldol Decanoate.  He reports patient is not compliant with PO medication.  He reports inpatient rehab will not help patient and reports [facility] may as he will not be able to use cocaine.  He reports when patient does well he has been reported to present "as a gentleman" without sx.  Advised him that state will be considered, but if patient becomes stabilized on medication, he will may not qualify.  He reports he was living with his mother, she could not handle him, then he went to a residence, was kicked out and now he has been staying at various shelters.

## 2021-01-25 NOTE — BH INPATIENT PSYCHIATRY PROGRESS NOTE - NSBHASSESSSUMMFT_PSY_ALL_CORE
>Obs: Routine, no current SI. no need for CO, patient not expected to pose risk to self or others in controlled inpatient setting  >Psychiatric Meds: Continue with current medication regimen. Continue to observe for tolerability and efficacy.  Add Depakote ER 1000mg PO at bedtime for mood  >Social: milieu therapy  >Treatment: Group therapy, coping skills development for emotion management and mood regulation. Motivational counseling for substance abuse related issues.   >Continue to provide therapeutic interventions in support of treatment goals.  >Dispo: Continues to require inpatient level of care for treatment of psychotic symptoms. Upon improvement in patients functioning the patient will be appropriate for step down to the outpatient level of care.

## 2021-01-26 PROCEDURE — 99232 SBSQ HOSP IP/OBS MODERATE 35: CPT

## 2021-01-26 RX ADMIN — QUETIAPINE FUMARATE 300 MILLIGRAM(S): 200 TABLET, FILM COATED ORAL at 20:41

## 2021-01-26 RX ADMIN — DIVALPROEX SODIUM 1000 MILLIGRAM(S): 500 TABLET, DELAYED RELEASE ORAL at 20:43

## 2021-01-26 RX ADMIN — Medication 3 MILLIGRAM(S): at 20:41

## 2021-01-26 RX ADMIN — Medication 100 MILLIGRAM(S): at 20:41

## 2021-01-26 RX ADMIN — Medication 3 MILLIGRAM(S): at 09:45

## 2021-01-26 RX ADMIN — Medication 100 MILLIGRAM(S): at 11:00

## 2021-01-26 RX ADMIN — Medication 5 MILLIGRAM(S): at 20:43

## 2021-01-26 RX ADMIN — Medication 2 MILLIGRAM(S): at 20:43

## 2021-01-26 RX ADMIN — PALIPERIDONE 6 MILLIGRAM(S): 1.5 TABLET, EXTENDED RELEASE ORAL at 20:43

## 2021-01-26 NOTE — BH INPATIENT PSYCHIATRY PROGRESS NOTE - NSBHFUPINTERVALHXFT_PSY_A_CORE
Per report, patient refused standing medication last night, but has been accepting PO PRN medication for agitation.  Pt remains loud, labile and intrusive on the unit.  Pt disorganized and tangential, switching from topic to topic, saying nonsensical things.  After risks and benefits discussed, patient agreeable to be compliant with standing medication.  Pt reports he has not used cocaine since the beginning of the month.

## 2021-01-27 PROCEDURE — 99232 SBSQ HOSP IP/OBS MODERATE 35: CPT

## 2021-01-27 RX ORDER — PALIPERIDONE 1.5 MG/1
9 TABLET, EXTENDED RELEASE ORAL AT BEDTIME
Refills: 0 | Status: DISCONTINUED | OUTPATIENT
Start: 2021-01-27 | End: 2021-01-28

## 2021-01-27 RX ADMIN — Medication 3 MILLIGRAM(S): at 09:39

## 2021-01-27 NOTE — BH INPATIENT PSYCHIATRY PROGRESS NOTE - NSBHFUPINTERVALHXFT_PSY_A_CORE
Pt compliant with medication and tolerating it well.  No events reported overnight.  Pt continues to be loud, irritable and intrusive with staff and peers at times on the unit.  Pt remains tangential and disorganized.  Pt requesting "1.999 Abilify."  Advised patient that per his outpatient psychiatrist, that is not a medication that has been helpful for him in the past as he was on Abilify Maintaina prior to admission and was rehospitalized.  Pt agreeable to take prescribed medication, but still asking for Abilify.  Pt observed responding to internal stimuli on the unit.  Pt denies SI/HI/I/P.

## 2021-01-27 NOTE — BH INPATIENT PSYCHIATRY PROGRESS NOTE - NSBHASSESSSUMMFT_PSY_ALL_CORE
>Obs: Routine, no current SI. no need for CO, patient not expected to pose risk to self or others in controlled inpatient setting  >Psychiatric Meds: Continue with current medication regimen. Continue to observe for tolerability and efficacy.  Titrate Invega to 9mg PO at bedtime for psychosis, Depakote ER 1000mg PO at bedtime for mood, continue Seroquel  >Social: milieu therapy  >Treatment: Group therapy, coping skills development for emotion management and mood regulation. Motivational counseling for substance abuse related issues.   >Continue to provide therapeutic interventions in support of treatment goals.  >Dispo: Continues to require inpatient level of care for treatment of psychotic symptoms. Upon improvement in patients functioning the patient will be appropriate for step down to the outpatient level of care.

## 2021-01-28 PROCEDURE — 99232 SBSQ HOSP IP/OBS MODERATE 35: CPT

## 2021-01-28 RX ORDER — LITHIUM CARBONATE 300 MG/1
300 TABLET, EXTENDED RELEASE ORAL AT BEDTIME
Refills: 0 | Status: DISCONTINUED | OUTPATIENT
Start: 2021-01-28 | End: 2021-01-29

## 2021-01-28 RX ORDER — ARIPIPRAZOLE 15 MG/1
2 TABLET ORAL AT BEDTIME
Refills: 0 | Status: DISCONTINUED | OUTPATIENT
Start: 2021-01-28 | End: 2021-02-08

## 2021-01-28 RX ORDER — FLUPHENAZINE HYDROCHLORIDE 1 MG/1
10 TABLET, FILM COATED ORAL AT BEDTIME
Refills: 0 | Status: DISCONTINUED | OUTPATIENT
Start: 2021-01-28 | End: 2021-02-01

## 2021-01-28 RX ADMIN — ARIPIPRAZOLE 2 MILLIGRAM(S): 15 TABLET ORAL at 21:21

## 2021-01-28 RX ADMIN — Medication 5 MILLIGRAM(S): at 21:22

## 2021-01-28 RX ADMIN — Medication 100 MILLIGRAM(S): at 09:27

## 2021-01-28 RX ADMIN — FLUPHENAZINE HYDROCHLORIDE 10 MILLIGRAM(S): 1 TABLET, FILM COATED ORAL at 21:22

## 2021-01-28 RX ADMIN — LITHIUM CARBONATE 300 MILLIGRAM(S): 300 TABLET, EXTENDED RELEASE ORAL at 21:22

## 2021-01-28 RX ADMIN — Medication 2 MILLIGRAM(S): at 09:27

## 2021-01-28 RX ADMIN — Medication 2 MILLIGRAM(S): at 21:22

## 2021-01-28 RX ADMIN — Medication 100 MILLIGRAM(S): at 21:22

## 2021-01-28 NOTE — BH INPATIENT PSYCHIATRY PROGRESS NOTE - NSBHASSESSSUMMFT_PSY_ALL_CORE
>Obs: Routine, no current SI. no need for CO, patient not expected to pose risk to self or others in controlled inpatient setting  >Psychiatric Meds: Discontinue Depakote ER, Seroquel and Invega as patient unwilling to continue to take.  Start Prolixin 10mg PO at bedtime and Lithobid 300mg PO at bedtime  >Social: milieu therapy  >Treatment: Group therapy, coping skills development for emotion management and mood regulation. Motivational counseling for substance abuse related issues.   >Continue to provide therapeutic interventions in support of treatment goals.  >Dispo: Continues to require inpatient level of care for treatment of psychotic symptoms. Upon improvement in patients functioning the patient will be appropriate for step down to the outpatient level of care.    >Obs: Routine, no current SI. no need for CO, patient not expected to pose risk to self or others in controlled inpatient setting  >Psychiatric Meds: Discontinue Depakote ER, Seroquel and Invega as patient unwilling to continue to take.  Start Prolixin 10mg PO at bedtime and Lithobid 300mg PO at bedtime with Abilify 2mg PO at bedtime  >Social: milieu therapy  >Treatment: Group therapy, coping skills development for emotion management and mood regulation. Motivational counseling for substance abuse related issues.   >Continue to provide therapeutic interventions in support of treatment goals.  >Dispo: Continues to require inpatient level of care for treatment of psychotic symptoms. Upon improvement in patients functioning the patient will be appropriate for step down to the outpatient level of care.

## 2021-01-28 NOTE — BH INPATIENT PSYCHIATRY PROGRESS NOTE - NSBHFUPINTERVALHXFT_PSY_A_CORE
Pt refused all medication last night.  Pt reports, "I don't feel well on that medication, I have no sex drive at all."  After discussion of medication, patient agreeable to take Prolixin and Lithium.  Pt adamant that he does not want Invega or Depakote or Seroquel.  Pt remains disorganized and tangential.  Pt loud, labile and intrusive on the unit.  Pt interrupts writer during conversations with other peers on the unit.  Pt reports he is noncompliant with his [facility] on an outpatient basis because his ACT team can never find him where he is sent after discharge.  Pt requesting to go to a hotel placement after discharge to avoid "getting into fights."  Pt denies SI/HI/I/P or AH/VH.  Pt observed responding to internal stimuli on the unit. Pt refused all medication last night.  Pt reports, "I don't feel well on that medication, I have no sex drive at all."  After discussion of medication, patient agreeable to take Prolixin and Lithium.  Pt adamant that he does not want Invega or Depakote or Seroquel and will only take other medication with low dose of Abilify.  Pt remains disorganized and tangential.  Pt loud, labile and intrusive on the unit.  Pt interrupts writer during conversations with other peers on the unit.  Pt reports he is noncompliant with his [facility] on an outpatient basis because his ACT team can never find him where he is sent after discharge.  Pt requesting to go to a hotel placement after discharge to avoid "getting into fights."  Pt denies SI/HI/I/P or AH/VH.  Pt observed responding to internal stimuli on the unit.

## 2021-01-29 PROCEDURE — 99232 SBSQ HOSP IP/OBS MODERATE 35: CPT

## 2021-01-29 RX ORDER — LITHIUM CARBONATE 300 MG/1
600 TABLET, EXTENDED RELEASE ORAL AT BEDTIME
Refills: 0 | Status: DISCONTINUED | OUTPATIENT
Start: 2021-01-29 | End: 2021-02-01

## 2021-01-29 RX ADMIN — Medication 100 MILLIGRAM(S): at 15:24

## 2021-01-29 RX ADMIN — Medication 2 MILLIGRAM(S): at 20:30

## 2021-01-29 RX ADMIN — ARIPIPRAZOLE 2 MILLIGRAM(S): 15 TABLET ORAL at 20:29

## 2021-01-29 RX ADMIN — LITHIUM CARBONATE 600 MILLIGRAM(S): 300 TABLET, EXTENDED RELEASE ORAL at 20:30

## 2021-01-29 RX ADMIN — Medication 5 MILLIGRAM(S): at 20:30

## 2021-01-29 RX ADMIN — Medication 2 MILLIGRAM(S): at 20:29

## 2021-01-29 RX ADMIN — FLUPHENAZINE HYDROCHLORIDE 10 MILLIGRAM(S): 1 TABLET, FILM COATED ORAL at 20:29

## 2021-01-29 RX ADMIN — Medication 100 MILLIGRAM(S): at 20:30

## 2021-01-29 NOTE — BH INPATIENT PSYCHIATRY PROGRESS NOTE - NSBHFUPINTERVALHXFT_PSY_A_CORE
Pt compliant with medication and tolerating it well.  No events reported overnight.  Pt remains disorganized and tangential, but is more cooperative with interview.  Pt irritable and intrusive with peers and staff on the unit.  Pt talks about how his ACT team owes him so much money and he wants that money prior to discharge.  Pt denies SI/HI/I/P and AH/VH.  Pt observed responding to internal stimuli on the unit.

## 2021-01-29 NOTE — BH INPATIENT PSYCHIATRY PROGRESS NOTE - NSBHASSESSSUMMFT_PSY_ALL_CORE
>Obs: Routine, no current SI. no need for CO, patient not expected to pose risk to self or others in controlled inpatient setting  >Psychiatric Meds: Start Prolixin 10mg PO at bedtime and titrate Lithobid to 600mg PO at bedtime with Abilify 2mg PO at bedtime  >Social: milieu therapy  >Treatment: Group therapy, coping skills development for emotion management and mood regulation. Motivational counseling for substance abuse related issues.   >Continue to provide therapeutic interventions in support of treatment goals.  >Dispo: Continues to require inpatient level of care for treatment of psychotic symptoms. Upon improvement in patients functioning the patient will be appropriate for step down to the outpatient level of care.    >Obs: Routine, no current SI. no need for CO, patient not expected to pose risk to self or others in controlled inpatient setting  >Psychiatric Meds: Start Prolixin 10mg PO at bedtime and titrate Lithobid to 600mg PO at bedtime with Abilify 2mg PO at bedtime, Lithium level and BMP on 2/1 AM  >Social: milieu therapy  >Treatment: Group therapy, coping skills development for emotion management and mood regulation. Motivational counseling for substance abuse related issues.   >Continue to provide therapeutic interventions in support of treatment goals.  >Dispo: Continues to require inpatient level of care for treatment of psychotic symptoms. Upon improvement in patients functioning the patient will be appropriate for step down to the outpatient level of care.

## 2021-01-30 RX ADMIN — Medication 2 MILLIGRAM(S): at 20:52

## 2021-01-30 RX ADMIN — Medication 100 MILLIGRAM(S): at 09:17

## 2021-01-30 RX ADMIN — Medication 2 MILLIGRAM(S): at 09:17

## 2021-01-30 RX ADMIN — Medication 5 MILLIGRAM(S): at 20:52

## 2021-01-30 RX ADMIN — Medication 50 MILLIGRAM(S): at 20:52

## 2021-01-30 RX ADMIN — Medication 100 MILLIGRAM(S): at 20:51

## 2021-01-30 RX ADMIN — ARIPIPRAZOLE 2 MILLIGRAM(S): 15 TABLET ORAL at 20:51

## 2021-01-30 RX ADMIN — LITHIUM CARBONATE 300 MILLIGRAM(S): 300 TABLET, EXTENDED RELEASE ORAL at 20:52

## 2021-01-30 RX ADMIN — FLUPHENAZINE HYDROCHLORIDE 10 MILLIGRAM(S): 1 TABLET, FILM COATED ORAL at 20:52

## 2021-01-31 RX ADMIN — ARIPIPRAZOLE 2 MILLIGRAM(S): 15 TABLET ORAL at 20:42

## 2021-01-31 RX ADMIN — Medication 5 MILLIGRAM(S): at 20:41

## 2021-01-31 RX ADMIN — Medication 50 MILLIGRAM(S): at 08:14

## 2021-01-31 RX ADMIN — Medication 2 MILLIGRAM(S): at 08:16

## 2021-01-31 RX ADMIN — Medication 50 MILLIGRAM(S): at 20:41

## 2021-01-31 RX ADMIN — Medication 2 MILLIGRAM(S): at 20:42

## 2021-01-31 RX ADMIN — Medication 100 MILLIGRAM(S): at 08:13

## 2021-01-31 RX ADMIN — LITHIUM CARBONATE 300 MILLIGRAM(S): 300 TABLET, EXTENDED RELEASE ORAL at 20:42

## 2021-01-31 RX ADMIN — FLUPHENAZINE HYDROCHLORIDE 10 MILLIGRAM(S): 1 TABLET, FILM COATED ORAL at 20:42

## 2021-02-01 LAB
ANION GAP SERPL CALC-SCNC: 9 MMOL/L — SIGNIFICANT CHANGE UP (ref 7–14)
BUN SERPL-MCNC: 25 MG/DL — HIGH (ref 7–23)
CALCIUM SERPL-MCNC: 10.1 MG/DL — SIGNIFICANT CHANGE UP (ref 8.4–10.5)
CHLORIDE SERPL-SCNC: 100 MMOL/L — SIGNIFICANT CHANGE UP (ref 98–107)
CO2 SERPL-SCNC: 30 MMOL/L — SIGNIFICANT CHANGE UP (ref 22–31)
CREAT SERPL-MCNC: 1.27 MG/DL — SIGNIFICANT CHANGE UP (ref 0.5–1.3)
GLUCOSE SERPL-MCNC: 77 MG/DL — SIGNIFICANT CHANGE UP (ref 70–99)
LITHIUM SERPL-MCNC: 0.5 MMOL/L — LOW (ref 0.6–1.2)
POTASSIUM SERPL-MCNC: 4.6 MMOL/L — SIGNIFICANT CHANGE UP (ref 3.5–5.3)
POTASSIUM SERPL-SCNC: 4.6 MMOL/L — SIGNIFICANT CHANGE UP (ref 3.5–5.3)
SODIUM SERPL-SCNC: 139 MMOL/L — SIGNIFICANT CHANGE UP (ref 135–145)

## 2021-02-01 PROCEDURE — 99232 SBSQ HOSP IP/OBS MODERATE 35: CPT

## 2021-02-01 RX ORDER — FLUPHENAZINE HYDROCHLORIDE 1 MG/1
15 TABLET, FILM COATED ORAL AT BEDTIME
Refills: 0 | Status: DISCONTINUED | OUTPATIENT
Start: 2021-02-01 | End: 2021-02-03

## 2021-02-01 RX ORDER — LITHIUM CARBONATE 300 MG/1
900 TABLET, EXTENDED RELEASE ORAL AT BEDTIME
Refills: 0 | Status: DISCONTINUED | OUTPATIENT
Start: 2021-02-01 | End: 2021-02-05

## 2021-02-01 RX ADMIN — FLUPHENAZINE HYDROCHLORIDE 5 MILLIGRAM(S): 1 TABLET, FILM COATED ORAL at 20:37

## 2021-02-01 RX ADMIN — LITHIUM CARBONATE 300 MILLIGRAM(S): 300 TABLET, EXTENDED RELEASE ORAL at 20:37

## 2021-02-01 RX ADMIN — Medication 50 MILLIGRAM(S): at 20:37

## 2021-02-01 RX ADMIN — Medication 2 MILLIGRAM(S): at 20:37

## 2021-02-01 RX ADMIN — ARIPIPRAZOLE 2 MILLIGRAM(S): 15 TABLET ORAL at 20:37

## 2021-02-01 RX ADMIN — Medication 5 MILLIGRAM(S): at 20:37

## 2021-02-01 RX ADMIN — Medication 2 MILLIGRAM(S): at 17:37

## 2021-02-01 NOTE — BH INPATIENT PSYCHIATRY PROGRESS NOTE - NSBHFUPINTERVALHXFT_PSY_A_CORE
Pt compliant with medication and tolerating it well.  No events reported overnight.  Pt with less disorganization and tangentiality today than previous days.  Pt focused on discharge and going to a safe place where he will not get into altercations.  Pt considering inpatient rehab.  Pt also focused on getting his checks from the ACT team.  Pt continues to reports that he is not able to connect with the ACT team in the community due to him not having access to phones.  Pt reports he sold his phone because he could not pay for the monthly bill.  Pt reports no store or place he stays in allows him to use their phone.  Pt intrusive with staff.

## 2021-02-01 NOTE — BH INPATIENT PSYCHIATRY PROGRESS NOTE - NSBHASSESSSUMMFT_PSY_ALL_CORE
>Obs: Routine, no current SI. no need for CO, patient not expected to pose risk to self or others in controlled inpatient setting  >Psychiatric Meds: Titrate Prolixin to 15mg PO at bedtime and titrate Lithobid to 900mg PO at bedtime with Abilify 2mg PO at bedtime  >Social: milieu therapy  >Treatment: Group therapy, coping skills development for emotion management and mood regulation. Motivational counseling for substance abuse related issues.   >Continue to provide therapeutic interventions in support of treatment goals.  >Dispo: Continues to require inpatient level of care for treatment of psychotic symptoms. Upon improvement in patients functioning the patient will be appropriate for step down to the outpatient level of care.

## 2021-02-02 PROCEDURE — 99232 SBSQ HOSP IP/OBS MODERATE 35: CPT

## 2021-02-02 RX ADMIN — FLUPHENAZINE HYDROCHLORIDE 15 MILLIGRAM(S): 1 TABLET, FILM COATED ORAL at 21:08

## 2021-02-02 RX ADMIN — Medication 2 MILLIGRAM(S): at 11:46

## 2021-02-02 RX ADMIN — Medication 5 MILLIGRAM(S): at 21:08

## 2021-02-02 RX ADMIN — Medication 2 MILLIGRAM(S): at 21:08

## 2021-02-02 RX ADMIN — LITHIUM CARBONATE 900 MILLIGRAM(S): 300 TABLET, EXTENDED RELEASE ORAL at 21:09

## 2021-02-02 RX ADMIN — ARIPIPRAZOLE 2 MILLIGRAM(S): 15 TABLET ORAL at 21:07

## 2021-02-02 NOTE — BH INPATIENT PSYCHIATRY PROGRESS NOTE - NSBHFUPINTERVALHXFT_PSY_A_CORE
Pt compliant with medication and tolerating it well.  No events reported overnight.  Pt focused on getting to a safe place after discharge.  Pt considering inpatient rehab, but also inquiring about going back to a residence, although he admits he was kicked out of residence's in the past.  Pt not forthcoming about his substance use.  Initially says he has not used substances in a long time, despite telling writer he used cocaine in the beginning of January last week.  Pt then talks about using rat poison prior to admission and someone giving him some powder substance that he did not know what it was.  Pt less disorganized and tangential than previous days.

## 2021-02-03 PROCEDURE — 99232 SBSQ HOSP IP/OBS MODERATE 35: CPT

## 2021-02-03 RX ORDER — FLUPHENAZINE HYDROCHLORIDE 1 MG/1
20 TABLET, FILM COATED ORAL AT BEDTIME
Refills: 0 | Status: DISCONTINUED | OUTPATIENT
Start: 2021-02-03 | End: 2021-02-08

## 2021-02-03 RX ORDER — NICOTINE POLACRILEX 2 MG
1 GUM BUCCAL DAILY
Refills: 0 | Status: DISCONTINUED | OUTPATIENT
Start: 2021-02-03 | End: 2021-03-05

## 2021-02-03 RX ADMIN — LITHIUM CARBONATE 900 MILLIGRAM(S): 300 TABLET, EXTENDED RELEASE ORAL at 20:55

## 2021-02-03 RX ADMIN — Medication 2 MILLIGRAM(S): at 20:55

## 2021-02-03 RX ADMIN — ARIPIPRAZOLE 2 MILLIGRAM(S): 15 TABLET ORAL at 20:54

## 2021-02-03 RX ADMIN — FLUPHENAZINE HYDROCHLORIDE 20 MILLIGRAM(S): 1 TABLET, FILM COATED ORAL at 20:54

## 2021-02-03 RX ADMIN — Medication 2 MILLIGRAM(S): at 15:52

## 2021-02-03 RX ADMIN — Medication 2 MILLIGRAM(S): at 08:37

## 2021-02-03 RX ADMIN — Medication 5 MILLIGRAM(S): at 20:55

## 2021-02-03 NOTE — BH INPATIENT PSYCHIATRY PROGRESS NOTE - NSBHFUPINTERVALHXFT_PSY_A_CORE
Pt compliant with medication and tolerating it well.  No events reported overnight.  Pt observed attending morning group.  Pt reports he learned in group about what people thought about the community.  Pt reports for himself he needs to go to a place like rehab or a structured residence and to stay there if he goes and only leave for essential things.  Pt able to acknowledge that if he stays where he is placed, his ACT team can contact him to avoid relapse.  Pt reports he would like to do inpatient rehab.  Pt reports at times he has trouble remembering things, so that is why he asks questions multiple times and can come off as intrusive.

## 2021-02-03 NOTE — BH INPATIENT PSYCHIATRY PROGRESS NOTE - NSBHASSESSSUMMFT_PSY_ALL_CORE
>Obs: Routine, no current SI. no need for CO, patient not expected to pose risk to self or others in controlled inpatient setting  >Psychiatric Meds: Titrate Prolixin to 20mg PO at bedtime and titrate Lithobid to 900mg PO at bedtime with Abilify 2mg PO at bedtime  >Social: milieu therapy  >Treatment: Group therapy, coping skills development for emotion management and mood regulation. Motivational counseling for substance abuse related issues.   >Continue to provide therapeutic interventions in support of treatment goals.  >Dispo: Continues to require inpatient level of care for treatment of psychotic symptoms. Upon improvement in patients functioning the patient will be appropriate for step down to the outpatient level of care.

## 2021-02-04 PROCEDURE — 99232 SBSQ HOSP IP/OBS MODERATE 35: CPT

## 2021-02-04 RX ADMIN — Medication 2 MILLIGRAM(S): at 13:43

## 2021-02-04 RX ADMIN — Medication 5 MILLIGRAM(S): at 20:01

## 2021-02-04 RX ADMIN — LITHIUM CARBONATE 900 MILLIGRAM(S): 300 TABLET, EXTENDED RELEASE ORAL at 20:01

## 2021-02-04 RX ADMIN — Medication 2 MILLIGRAM(S): at 20:02

## 2021-02-04 RX ADMIN — ARIPIPRAZOLE 2 MILLIGRAM(S): 15 TABLET ORAL at 20:02

## 2021-02-04 RX ADMIN — Medication 1 PATCH: at 08:33

## 2021-02-04 RX ADMIN — FLUPHENAZINE HYDROCHLORIDE 20 MILLIGRAM(S): 1 TABLET, FILM COATED ORAL at 20:01

## 2021-02-04 NOTE — BH INPATIENT PSYCHIATRY PROGRESS NOTE - NSBHASSESSSUMMFT_PSY_ALL_CORE
>Obs: Routine, no current SI. no need for CO, patient not expected to pose risk to self or others in controlled inpatient setting  >Psychiatric Meds: Titrate Prolixin to 20mg PO at bedtime and titrate Lithobid to 900mg PO at bedtime, Lithium level tomorrow AM, with Abilify 2mg PO at bedtime  >Social: milieu therapy  >Treatment: Group therapy, coping skills development for emotion management and mood regulation. Motivational counseling for substance abuse related issues.   >Continue to provide therapeutic interventions in support of treatment goals.  >Dispo: Continues to require inpatient level of care for treatment of psychotic symptoms. Upon improvement in patients functioning the patient will be appropriate for step down to the outpatient level of care.

## 2021-02-04 NOTE — ED ADULT NURSE NOTE - NS ED NURSE AMBULANCES2
Tazorac Counseling:  Patient advised that medication is irritating and drying.  Patient may need to apply sparingly and wash off after an hour before eventually leaving it on overnight.  The patient verbalized understanding of the proper use and possible adverse effects of tazorac.  All of the patient's questions and concerns were addressed. High Dose Vitamin A Pregnancy And Lactation Text: High dose vitamin A therapy is contraindicated during pregnancy and breast feeding. Dapsone Counseling: I discussed with the patient the risks of dapsone including but not limited to hemolytic anemia, agranulocytosis, rashes, methemoglobinemia, kidney failure, peripheral neuropathy, headaches, GI upset, and liver toxicity.  Patients who start dapsone require monitoring including baseline LFTs and weekly CBCs for the first month, then every month thereafter.  The patient verbalized understanding of the proper use and possible adverse effects of dapsone.  All of the patient's questions and concerns were addressed. Tetracycline Pregnancy And Lactation Text: This medication is Pregnancy Category D and not consider safe during pregnancy. It is also excreted in breast milk. Topical Sulfur Applications Counseling: Topical Sulfur Counseling: Patient counseled that this medication may cause skin irritation or allergic reactions.  In the event of skin irritation, the patient was advised to reduce the amount of the drug applied or use it less frequently.   The patient verbalized understanding of the proper use and possible adverse effects of topical sulfur application.  All of the patient's questions and concerns were addressed. Azithromycin Pregnancy And Lactation Text: This medication is considered safe during pregnancy and is also secreted in breast milk. Detail Level: Zone Erythromycin Counseling:  I discussed with the patient the risks of erythromycin including but not limited to GI upset, allergic reaction, drug rash, diarrhea, increase in liver enzymes, and yeast infections. Topical Retinoid Pregnancy And Lactation Text: This medication is Pregnancy Category C. It is unknown if this medication is excreted in breast milk. Tetracycline Counseling: Patient counseled regarding possible photosensitivity and increased risk for sunburn.  Patient instructed to avoid sunlight, if possible.  When exposed to sunlight, patients should wear protective clothing, sunglasses, and sunscreen.  The patient was instructed to call the office immediately if the following severe adverse effects occur:  hearing changes, easy bruising/bleeding, severe headache, or vision changes.  The patient verbalized understanding of the proper use and possible adverse effects of tetracycline.  All of the patient's questions and concerns were addressed. Patient understands to avoid pregnancy while on therapy due to potential birth defects. Topical Clindamycin Pregnancy And Lactation Text: This medication is Pregnancy Category B and is considered safe during pregnancy. It is unknown if it is excreted in breast milk. Birth Control Pills Pregnancy And Lactation Text: This medication should be avoided if pregnant and for the first 30 days post-partum. Azithromycin Counseling:  I discussed with the patient the risks of azithromycin including but not limited to GI upset, allergic reaction, drug rash, diarrhea, and yeast infections. High Dose Vitamin A Counseling: Side effects reviewed, pt to contact office should one occur. Doxycycline Pregnancy And Lactation Text: This medication is Pregnancy Category D and not consider safe during pregnancy. It is also excreted in breast milk but is considered safe for shorter treatment courses. Sarecycline Counseling: Patient advised regarding possible photosensitivity and discoloration of the teeth, skin, lips, tongue and gums.  Patient instructed to avoid sunlight, if possible.  When exposed to sunlight, patients should wear protective clothing, sunglasses, and sunscreen.  The patient was instructed to call the office immediately if the following severe adverse effects occur:  hearing changes, easy bruising/bleeding, severe headache, or vision changes.  The patient verbalized understanding of the proper use and possible adverse effects of sarecycline.  All of the patient's questions and concerns were addressed. Include Pregnancy/Lactation Warning?: No Topical Retinoid counseling:  Patient advised to apply a pea-sized amount only at bedtime and wait 30 minutes after washing their face before applying.  If too drying, patient may add a non-comedogenic moisturizer. The patient verbalized understanding of the proper use and possible adverse effects of retinoids.  All of the patient's questions and concerns were addressed. Birth Control Pills Counseling: Birth Control Pill Counseling: I discussed with the patient the potential side effects of OCPs including but not limited to increased risk of stroke, heart attack, thrombophlebitis, deep venous thrombosis, hepatic adenomas, breast changes, GI upset, headaches, and depression.  The patient verbalized understanding of the proper use and possible adverse effects of OCPs. All of the patient's questions and concerns were addressed. Topical Clindamycin Counseling: Patient counseled that this medication may cause skin irritation or allergic reactions.  In the event of skin irritation, the patient was advised to reduce the amount of the drug applied or use it less frequently.   The patient verbalized understanding of the proper use and possible adverse effects of clindamycin.  All of the patient's questions and concerns were addressed. Isotretinoin Pregnancy And Lactation Text: This medication is Pregnancy Category X and is considered extremely dangerous during pregnancy. It is unknown if it is excreted in breast milk. Spironolactone Pregnancy And Lactation Text: This medication can cause feminization of the male fetus and should be avoided during pregnancy. The active metabolite is also found in breast milk. Doxycycline Counseling:  Patient counseled regarding possible photosensitivity and increased risk for sunburn.  Patient instructed to avoid sunlight, if possible.  When exposed to sunlight, patients should wear protective clothing, sunglasses, and sunscreen.  The patient was instructed to call the office immediately if the following severe adverse effects occur:  hearing changes, easy bruising/bleeding, severe headache, or vision changes.  The patient verbalized understanding of the proper use and possible adverse effects of doxycycline.  All of the patient's questions and concerns were addressed. Benzoyl Peroxide Pregnancy And Lactation Text: This medication is Pregnancy Category C. It is unknown if benzoyl peroxide is excreted in breast milk. Bactrim Pregnancy And Lactation Text: This medication is Pregnancy Category D and is known to cause fetal risk.  It is also excreted in breast milk. Isotretinoin Counseling: Patient should get monthly blood tests, not donate blood, not drive at night if vision affected, not share medication, and not undergo elective surgery for 6 months after tx completed. Side effects reviewed, pt to contact office should one occur. Dapsone Pregnancy And Lactation Text: This medication is Pregnancy Category C and is not considered safe during pregnancy or breast feeding. Spironolactone Counseling: Patient advised regarding risks of diarrhea, abdominal pain, hyperkalemia, birth defects (for female patients), liver toxicity and renal toxicity. The patient may need blood work to monitor liver and kidney function and potassium levels while on therapy. The patient verbalized understanding of the proper use and possible adverse effects of spironolactone.  All of the patient's questions and concerns were addressed. Tazorac Pregnancy And Lactation Text: This medication is not safe during pregnancy. It is unknown if this medication is excreted in breast milk. Minocycline Counseling: Patient advised regarding possible photosensitivity and discoloration of the teeth, skin, lips, tongue and gums.  Patient instructed to avoid sunlight, if possible.  When exposed to sunlight, patients should wear protective clothing, sunglasses, and sunscreen.  The patient was instructed to call the office immediately if the following severe adverse effects occur:  hearing changes, easy bruising/bleeding, severe headache, or vision changes.  The patient verbalized understanding of the proper use and possible adverse effects of minocycline.  All of the patient's questions and concerns were addressed. Benzoyl Peroxide Counseling: Patient counseled that medicine may cause skin irritation and bleach clothing.  In the event of skin irritation, the patient was advised to reduce the amount of the drug applied or use it less frequently.   The patient verbalized understanding of the proper use and possible adverse effects of benzoyl peroxide.  All of the patient's questions and concerns were addressed. Topical Sulfur Applications Pregnancy And Lactation Text: This medication is Pregnancy Category C and has an unknown safety profile during pregnancy. It is unknown if this topical medication is excreted in breast milk. Bactrim Counseling:  I discussed with the patient the risks of sulfa antibiotics including but not limited to GI upset, allergic reaction, drug rash, diarrhea, dizziness, photosensitivity, and yeast infections.  Rarely, more serious reactions can occur including but not limited to aplastic anemia, agranulocytosis, methemoglobinemia, blood dyscrasias, liver or kidney failure, lung infiltrates or desquamative/blistering drug rashes. Erythromycin Pregnancy And Lactation Text: This medication is Pregnancy Category B and is considered safe during pregnancy. It is also excreted in breast milk. [facility] Detail Level: Detailed

## 2021-02-04 NOTE — BH INPATIENT PSYCHIATRY PROGRESS NOTE - NSBHFUPINTERVALHXFT_PSY_A_CORE
Pt compliant with medication and tolerating it well.  No events reported overnight.  Pt able to have more goal directed conversation today.  Pt denies SI/HI/I/P or AH/VH or paranoia.  Pt continues to want to go to inpatient rehab or to a residence where he would feel safe and would be able to be in contact with his ACT team.  Pt understands that if he keeps coming back to the hospital after discharge, does not take his medications and uses substances, another treatment team may pursue state hospitalization.

## 2021-02-05 LAB — LITHIUM SERPL-MCNC: 0.6 MMOL/L — SIGNIFICANT CHANGE UP (ref 0.6–1.2)

## 2021-02-05 PROCEDURE — 99232 SBSQ HOSP IP/OBS MODERATE 35: CPT

## 2021-02-05 RX ORDER — LITHIUM CARBONATE 300 MG/1
1200 TABLET, EXTENDED RELEASE ORAL AT BEDTIME
Refills: 0 | Status: DISCONTINUED | OUTPATIENT
Start: 2021-02-05 | End: 2021-02-11

## 2021-02-05 RX ADMIN — LITHIUM CARBONATE 300 MILLIGRAM(S): 300 TABLET, EXTENDED RELEASE ORAL at 20:46

## 2021-02-05 RX ADMIN — Medication 2 MILLIGRAM(S): at 20:46

## 2021-02-05 RX ADMIN — FLUPHENAZINE HYDROCHLORIDE 10 MILLIGRAM(S): 1 TABLET, FILM COATED ORAL at 20:47

## 2021-02-05 RX ADMIN — Medication 5 MILLIGRAM(S): at 20:46

## 2021-02-05 RX ADMIN — Medication 2 MILLIGRAM(S): at 20:47

## 2021-02-05 RX ADMIN — ARIPIPRAZOLE 2 MILLIGRAM(S): 15 TABLET ORAL at 20:47

## 2021-02-05 NOTE — BH INPATIENT PSYCHIATRY PROGRESS NOTE - NSBHFUPINTERVALHXFT_PSY_A_CORE
Pt compliant with medication and tolerating it well.  No events reported overnight.  Pt denies SI/HI/I/P or AH/VH or paranoia.  Pt eating and sleeping well.  Pt less intrusive on the unit.  Pt more organized and goal directed during interview.  Pt reports having memory issues, attributes it to long term substance use.  Pt continues to want to go to inpatient rehab.

## 2021-02-05 NOTE — BH INPATIENT PSYCHIATRY PROGRESS NOTE - NSBHASSESSSUMMFT_PSY_ALL_CORE
>Obs: Routine, no current SI. no need for CO, patient not expected to pose risk to self or others in controlled inpatient setting  >Psychiatric Meds: Titrate Prolixin to 20mg PO at bedtime and titrate Lithobid to 1200mg PO at bedtime, Lithium level tomorrow AM, with Abilify 2mg PO at bedtime  >Social: milieu therapy  >Treatment: Group therapy, coping skills development for emotion management and mood regulation. Motivational counseling for substance abuse related issues.   >Continue to provide therapeutic interventions in support of treatment goals.  >Dispo: Continues to require inpatient level of care for treatment of psychotic symptoms. Upon improvement in patients functioning the patient will be appropriate for step down to the outpatient level of care.

## 2021-02-06 RX ADMIN — ARIPIPRAZOLE 2 MILLIGRAM(S): 15 TABLET ORAL at 21:02

## 2021-02-06 RX ADMIN — FLUPHENAZINE HYDROCHLORIDE 20 MILLIGRAM(S): 1 TABLET, FILM COATED ORAL at 21:02

## 2021-02-06 RX ADMIN — Medication 5 MILLIGRAM(S): at 21:03

## 2021-02-06 RX ADMIN — Medication 2 MILLIGRAM(S): at 21:02

## 2021-02-06 RX ADMIN — LITHIUM CARBONATE 1200 MILLIGRAM(S): 300 TABLET, EXTENDED RELEASE ORAL at 21:03

## 2021-02-07 RX ADMIN — LITHIUM CARBONATE 300 MILLIGRAM(S): 300 TABLET, EXTENDED RELEASE ORAL at 20:31

## 2021-02-07 RX ADMIN — Medication 5 MILLIGRAM(S): at 20:31

## 2021-02-07 RX ADMIN — ARIPIPRAZOLE 2 MILLIGRAM(S): 15 TABLET ORAL at 20:31

## 2021-02-07 RX ADMIN — FLUPHENAZINE HYDROCHLORIDE 10 MILLIGRAM(S): 1 TABLET, FILM COATED ORAL at 20:31

## 2021-02-07 RX ADMIN — Medication 2 MILLIGRAM(S): at 20:31

## 2021-02-08 PROCEDURE — 99232 SBSQ HOSP IP/OBS MODERATE 35: CPT

## 2021-02-08 RX ORDER — ARIPIPRAZOLE 15 MG/1
5 TABLET ORAL AT BEDTIME
Refills: 0 | Status: DISCONTINUED | OUTPATIENT
Start: 2021-02-08 | End: 2021-02-11

## 2021-02-08 RX ORDER — FLUPHENAZINE HYDROCHLORIDE 1 MG/1
25 TABLET, FILM COATED ORAL AT BEDTIME
Refills: 0 | Status: DISCONTINUED | OUTPATIENT
Start: 2021-02-08 | End: 2021-02-10

## 2021-02-08 RX ADMIN — Medication 2 MILLIGRAM(S): at 20:49

## 2021-02-08 RX ADMIN — ARIPIPRAZOLE 5 MILLIGRAM(S): 15 TABLET ORAL at 20:48

## 2021-02-08 RX ADMIN — Medication 5 MILLIGRAM(S): at 20:48

## 2021-02-08 RX ADMIN — FLUPHENAZINE HYDROCHLORIDE 10 MILLIGRAM(S): 1 TABLET, FILM COATED ORAL at 20:48

## 2021-02-08 RX ADMIN — LITHIUM CARBONATE 300 MILLIGRAM(S): 300 TABLET, EXTENDED RELEASE ORAL at 20:48

## 2021-02-08 NOTE — BH INPATIENT PSYCHIATRY PROGRESS NOTE - NSBHFUPINTERVALHXFT_PSY_A_CORE
Pt compliant with medication and tolerating it well.  No events reported overnight.  Pt perseverative at times about the checks his ACT team has for him and how he is going to get them.  SW reported she will inquire with ACT team.  Pt agreeable to do rehab phone screening today.  Pt denies SI/HI/I/P or AH/VH or paranoia.  Pt eating and sleeping well.

## 2021-02-08 NOTE — BH INPATIENT PSYCHIATRY PROGRESS NOTE - NSBHASSESSSUMMFT_PSY_ALL_CORE
>Obs: Routine, no current SI. no need for CO, patient not expected to pose risk to self or others in controlled inpatient setting  >Psychiatric Meds: Titrate Prolixin to 20mg PO at bedtime and titrate Lithobid to 1200mg PO at bedtime, with Abilify 2mg PO at bedtime  >Social: milieu therapy  >Treatment: Group therapy, coping skills development for emotion management and mood regulation. Motivational counseling for substance abuse related issues.   >Continue to provide therapeutic interventions in support of treatment goals.  >Dispo: Continues to require inpatient level of care for treatment of psychotic symptoms. Upon improvement in patients functioning the patient will be appropriate for step down to the outpatient level of care.    >Obs: Routine, no current SI. no need for CO, patient not expected to pose risk to self or others in controlled inpatient setting  >Psychiatric Meds: Titrate Prolixin to 25mg PO at bedtime and titrate Lithobid to 1200mg PO at bedtime, with Abilify titrated to 5mg PO at bedtime.  Case discussed with unit chief, Dr. Zapata and patient may benefit from Prolixin Decanoate + Aristada 1064mg IM without Lithium as patient tends to be noncompliant with PO medication in the community  >Social: milieu therapy  >Treatment: Group therapy, coping skills development for emotion management and mood regulation. Motivational counseling for substance abuse related issues.   >Continue to provide therapeutic interventions in support of treatment goals.  >Dispo: Continues to require inpatient level of care for treatment of psychotic symptoms. Upon improvement in patients functioning the patient will be appropriate for step down to the outpatient level of care.

## 2021-02-09 LAB
A1C WITH ESTIMATED AVERAGE GLUCOSE RESULT: 6 % — HIGH (ref 4–5.6)
CHOLEST SERPL-MCNC: 264 MG/DL — HIGH
ESTIMATED AVERAGE GLUCOSE: 126 MG/DL — HIGH (ref 68–114)
HDLC SERPL-MCNC: 84 MG/DL — SIGNIFICANT CHANGE UP
LIPID PNL WITH DIRECT LDL SERPL: 154 MG/DL — HIGH
NON HDL CHOLESTEROL: 180 MG/DL — HIGH
TRIGL SERPL-MCNC: 130 MG/DL — SIGNIFICANT CHANGE UP

## 2021-02-09 PROCEDURE — 99232 SBSQ HOSP IP/OBS MODERATE 35: CPT

## 2021-02-09 RX ADMIN — FLUPHENAZINE HYDROCHLORIDE 25 MILLIGRAM(S): 1 TABLET, FILM COATED ORAL at 19:54

## 2021-02-09 RX ADMIN — ARIPIPRAZOLE 5 MILLIGRAM(S): 15 TABLET ORAL at 19:54

## 2021-02-09 RX ADMIN — LITHIUM CARBONATE 1200 MILLIGRAM(S): 300 TABLET, EXTENDED RELEASE ORAL at 19:54

## 2021-02-09 RX ADMIN — Medication 5 MILLIGRAM(S): at 19:54

## 2021-02-09 RX ADMIN — Medication 2 MILLIGRAM(S): at 19:54

## 2021-02-09 RX ADMIN — Medication 1 PATCH: at 19:54

## 2021-02-09 RX ADMIN — Medication 1 PATCH: at 11:15

## 2021-02-09 RX ADMIN — Medication 2 MILLIGRAM(S): at 19:51

## 2021-02-09 RX ADMIN — Medication 50 MILLIGRAM(S): at 19:51

## 2021-02-09 NOTE — BH INPATIENT PSYCHIATRY PROGRESS NOTE - NSBHASSESSSUMMFT_PSY_ALL_CORE
>Obs: Routine, no current SI. no need for CO, patient not expected to pose risk to self or others in controlled inpatient setting  >Psychiatric Meds: Titrate Prolixin to 25mg PO at bedtime and titrate Lithobid to 1200mg PO at bedtime, with Abilify titrated to 5mg PO at bedtime.  Case discussed with unit chief, Dr. Zapata and patient may benefit from Prolixin Decanoate + Aristada 1064mg IM without Lithium as patient tends to be noncompliant with PO medication in the community  >Social: milieu therapy  >Treatment: Group therapy, coping skills development for emotion management and mood regulation. Motivational counseling for substance abuse related issues.   >Continue to provide therapeutic interventions in support of treatment goals.  >Dispo: Continues to require inpatient level of care for treatment. Upon improvement in patients functioning the patient will be appropriate for step down to the outpatient level of care.

## 2021-02-09 NOTE — BH INPATIENT PSYCHIATRY PROGRESS NOTE - NSBHFUPINTERVALHXFT_PSY_A_CORE
Pt compliant with medication and tolerating it well.  No events reported overnight.   Pt compliant with medication and tolerating it well.  No events reported overnight.  Pt pleasant and cooperative upon approach, without manic or psychotic sx.  Pt remains agreeable to go to inpatient rehab.  Pt understands if he does not comply he may end up in state hospital as recommended by his ACT and AOT team.  Discussed with patient recommendation of getting him on 2 Juan, Aristada and Prolixin, as he seems to be noncompliant with medication in the community and he is agreeable.      Writer, JULIO Tay, patient's mother, ACT and AOT team worker and ACT psychiatrist partake in meeting today.  Outpatient team recommends state as patient is noncompliant, aggressive and uses cocaine in the community.  Inpatient team advocating for substance abuse treatment and to add it to AOT order.  Inpatient team will contact state if patient is appropriate as he seems to be clearing mentally and substance abuse seems to be the main issue at this time.

## 2021-02-10 PROCEDURE — 99238 HOSP IP/OBS DSCHRG MGMT 30/<: CPT

## 2021-02-10 RX ORDER — FLUPHENAZINE HYDROCHLORIDE 1 MG/1
30 TABLET, FILM COATED ORAL AT BEDTIME
Refills: 0 | Status: DISCONTINUED | OUTPATIENT
Start: 2021-02-10 | End: 2021-02-23

## 2021-02-10 RX ADMIN — LITHIUM CARBONATE 1200 MILLIGRAM(S): 300 TABLET, EXTENDED RELEASE ORAL at 21:02

## 2021-02-10 RX ADMIN — Medication 2 MILLIGRAM(S): at 21:01

## 2021-02-10 RX ADMIN — FLUPHENAZINE HYDROCHLORIDE 30 MILLIGRAM(S): 1 TABLET, FILM COATED ORAL at 21:01

## 2021-02-10 RX ADMIN — Medication 5 MILLIGRAM(S): at 21:02

## 2021-02-10 RX ADMIN — ARIPIPRAZOLE 5 MILLIGRAM(S): 15 TABLET ORAL at 21:01

## 2021-02-10 RX ADMIN — Medication 50 MILLIGRAM(S): at 21:03

## 2021-02-10 NOTE — BH INPATIENT PSYCHIATRY PROGRESS NOTE - NSBHASSESSSUMMFT_PSY_ALL_CORE
>Obs: Routine, no current SI. no need for CO, patient not expected to pose risk to self or others in controlled inpatient setting  >Psychiatric Meds: Titrate Prolixin to 30mg PO at bedtime, Lithobid to 1200mg PO at bedtime plan to taper down and discontinue with Abilify titration, with Abilify titrated to 5mg PO at bedtime.  Case discussed with unit chief, Dr. Zapata and patient may benefit from Prolixin Decanoate + Aristada 1064mg IM without Lithium as patient tends to be noncompliant with PO medication in the community  >Social: milieu therapy  >Treatment: Group therapy, coping skills development for emotion management and mood regulation. Motivational counseling for substance abuse related issues.   >Continue to provide therapeutic interventions in support of treatment goals.  >Dispo: Continues to require inpatient level of care for treatment. Upon improvement in patients functioning the patient will be appropriate for step down to the outpatient level of care.

## 2021-02-10 NOTE — BH INPATIENT PSYCHIATRY PROGRESS NOTE - NSBHFUPINTERVALHXFT_PSY_A_CORE
Pt compliant with medication and tolerating it well.  No events reported overnight.  Discussed with patient outcome of treatment team meeting with his ACT and AOT workers and how they recommend state hospitalization.  Pt understands if he keeps coming back to the hospital another treatment team may pursue this.  Pt continues to be agreeable to receive 2 Juan and go to inpatient rehab.  Pt denies SI/HI/I/P or AH/VH.  Pt reports good sleep and appetite.

## 2021-02-11 PROCEDURE — 99232 SBSQ HOSP IP/OBS MODERATE 35: CPT

## 2021-02-11 RX ORDER — LITHIUM CARBONATE 300 MG/1
900 TABLET, EXTENDED RELEASE ORAL AT BEDTIME
Refills: 0 | Status: DISCONTINUED | OUTPATIENT
Start: 2021-02-11 | End: 2021-02-18

## 2021-02-11 RX ORDER — ARIPIPRAZOLE 15 MG/1
10 TABLET ORAL AT BEDTIME
Refills: 0 | Status: DISCONTINUED | OUTPATIENT
Start: 2021-02-11 | End: 2021-02-16

## 2021-02-11 RX ADMIN — ARIPIPRAZOLE 10 MILLIGRAM(S): 15 TABLET ORAL at 20:54

## 2021-02-11 RX ADMIN — FLUPHENAZINE HYDROCHLORIDE 30 MILLIGRAM(S): 1 TABLET, FILM COATED ORAL at 20:55

## 2021-02-11 RX ADMIN — Medication 2 MILLIGRAM(S): at 20:54

## 2021-02-11 RX ADMIN — LITHIUM CARBONATE 900 MILLIGRAM(S): 300 TABLET, EXTENDED RELEASE ORAL at 20:54

## 2021-02-11 RX ADMIN — Medication 5 MILLIGRAM(S): at 20:54

## 2021-02-11 NOTE — BH INPATIENT PSYCHIATRY PROGRESS NOTE - NSBHASSESSSUMMFT_PSY_ALL_CORE
>Obs: Routine, no current SI. no need for CO, patient not expected to pose risk to self or others in controlled inpatient setting  >Psychiatric Meds: Titrate Prolixin to 30mg PO at bedtime, Lithobid tapered down to 900mg PO at bedtime plan to taper down and discontinue with Abilify titration, with Abilify titrated to 10mg PO at bedtime.  Case discussed with unit chief, Dr. Zapata and patient may benefit from Prolixin Decanoate + Aristada 1064mg IM without Lithium as patient tends to be noncompliant with PO medication in the community  >Social: milieu therapy  >Treatment: Group therapy, coping skills development for emotion management and mood regulation. Motivational counseling for substance abuse related issues.   >Continue to provide therapeutic interventions in support of treatment goals.  >Dispo: Continues to require inpatient level of care for treatment. Upon improvement in patients functioning the patient will be appropriate for step down to the outpatient level of care.

## 2021-02-11 NOTE — BH INPATIENT PSYCHIATRY PROGRESS NOTE - NSBHFUPINTERVALHXFT_PSY_A_CORE
Pt compliant with medication and tolerating it well.  No events reported overnight.  Pt denies SI/HI/I/P and AH/VH or paranoia.  Pt reports eating and sleeping well.  Pt reports frustration over not knowing which rehab he will be going to and not having any concrete answers at this time.

## 2021-02-12 PROCEDURE — 99232 SBSQ HOSP IP/OBS MODERATE 35: CPT

## 2021-02-12 RX ADMIN — Medication 5 MILLIGRAM(S): at 21:02

## 2021-02-12 RX ADMIN — ARIPIPRAZOLE 10 MILLIGRAM(S): 15 TABLET ORAL at 21:00

## 2021-02-12 RX ADMIN — Medication 2 MILLIGRAM(S): at 21:00

## 2021-02-12 RX ADMIN — FLUPHENAZINE HYDROCHLORIDE 30 MILLIGRAM(S): 1 TABLET, FILM COATED ORAL at 21:01

## 2021-02-12 RX ADMIN — LITHIUM CARBONATE 900 MILLIGRAM(S): 300 TABLET, EXTENDED RELEASE ORAL at 21:02

## 2021-02-12 RX ADMIN — Medication 50 MILLIGRAM(S): at 21:04

## 2021-02-12 NOTE — BH INPATIENT PSYCHIATRY PROGRESS NOTE - NSBHFUPINTERVALHXFT_PSY_A_CORE
Pt compliant with medication and tolerating it well.  No events reported overnight.  Pt calm and cooperative with interview.  Pt perseverative about how he will receive his checks as he has no money.  Pt denies SI/HI/I/P or AH/VH or paranoia.  Pt eating and sleeping well.

## 2021-02-13 RX ADMIN — Medication 5 MILLIGRAM(S): at 20:21

## 2021-02-13 RX ADMIN — LITHIUM CARBONATE 900 MILLIGRAM(S): 300 TABLET, EXTENDED RELEASE ORAL at 20:21

## 2021-02-13 RX ADMIN — Medication 2 MILLIGRAM(S): at 20:21

## 2021-02-13 RX ADMIN — ARIPIPRAZOLE 10 MILLIGRAM(S): 15 TABLET ORAL at 20:20

## 2021-02-13 RX ADMIN — FLUPHENAZINE HYDROCHLORIDE 30 MILLIGRAM(S): 1 TABLET, FILM COATED ORAL at 20:21

## 2021-02-14 RX ADMIN — FLUPHENAZINE HYDROCHLORIDE 30 MILLIGRAM(S): 1 TABLET, FILM COATED ORAL at 20:43

## 2021-02-14 RX ADMIN — Medication 5 MILLIGRAM(S): at 20:43

## 2021-02-14 RX ADMIN — LITHIUM CARBONATE 900 MILLIGRAM(S): 300 TABLET, EXTENDED RELEASE ORAL at 20:43

## 2021-02-14 RX ADMIN — ARIPIPRAZOLE 10 MILLIGRAM(S): 15 TABLET ORAL at 20:44

## 2021-02-14 RX ADMIN — Medication 2 MILLIGRAM(S): at 20:44

## 2021-02-15 RX ADMIN — ARIPIPRAZOLE 10 MILLIGRAM(S): 15 TABLET ORAL at 20:25

## 2021-02-15 RX ADMIN — FLUPHENAZINE HYDROCHLORIDE 10 MILLIGRAM(S): 1 TABLET, FILM COATED ORAL at 20:24

## 2021-02-15 RX ADMIN — Medication 5 MILLIGRAM(S): at 20:25

## 2021-02-15 RX ADMIN — LITHIUM CARBONATE 300 MILLIGRAM(S): 300 TABLET, EXTENDED RELEASE ORAL at 20:24

## 2021-02-15 RX ADMIN — Medication 2 MILLIGRAM(S): at 20:25

## 2021-02-16 PROCEDURE — 99232 SBSQ HOSP IP/OBS MODERATE 35: CPT

## 2021-02-16 RX ORDER — ARIPIPRAZOLE 15 MG/1
15 TABLET ORAL AT BEDTIME
Refills: 0 | Status: DISCONTINUED | OUTPATIENT
Start: 2021-02-16 | End: 2021-02-24

## 2021-02-16 RX ADMIN — ARIPIPRAZOLE 15 MILLIGRAM(S): 15 TABLET ORAL at 20:53

## 2021-02-16 RX ADMIN — LITHIUM CARBONATE 300 MILLIGRAM(S): 300 TABLET, EXTENDED RELEASE ORAL at 20:53

## 2021-02-16 RX ADMIN — Medication 5 MILLIGRAM(S): at 20:53

## 2021-02-16 RX ADMIN — Medication 2 MILLIGRAM(S): at 20:53

## 2021-02-16 RX ADMIN — FLUPHENAZINE HYDROCHLORIDE 10 MILLIGRAM(S): 1 TABLET, FILM COATED ORAL at 20:53

## 2021-02-16 NOTE — BH INPATIENT PSYCHIATRY PROGRESS NOTE - NSBHCONTPROVIDER_PSY_ALL_CORE
Yes... Melolabial Transposition Flap Text: The defect edges were debeveled with a #15 scalpel blade.  Given the location of the defect and the proximity to free margins a melolabial flap was deemed most appropriate.  Using a sterile surgical marker, an appropriate melolabial transposition flap was drawn incorporating the defect.    The area thus outlined was incised deep to adipose tissue with a #15 scalpel blade.  The skin margins were undermined to an appropriate distance in all directions utilizing iris scissors.

## 2021-02-16 NOTE — BH INPATIENT PSYCHIATRY PROGRESS NOTE - NSBHFUPINTERVALHXFT_PSY_A_CORE
Pt compliant with medication and tolerating it well.  No events reported overnight.  Pt denies SI/HI/I/P or AH/VH or paranoia.  Pt reports eating and sleeping well.  Pt reports he feels his current medication is helping him and continues to be agreeable to be on 2 Juan.  Pt expresses frustration about not having a discharge date.  Pt reports, "I am aware that if I do not do rehab I will come back to the hospital."  Pt reports marijuana is his substance of choice and "I do the hard stuff when I am bored."  Pt continues to be agreeable to do inpatient rehab.

## 2021-02-16 NOTE — BH INPATIENT PSYCHIATRY PROGRESS NOTE - NSBHASSESSSUMMFT_PSY_ALL_CORE
>Obs: Routine, no current SI. no need for CO, patient not expected to pose risk to self or others in controlled inpatient setting  >Psychiatric Meds: Titrate Prolixin to 30mg PO at bedtime, Lithobid tapered down to 900mg PO at bedtime plan to taper down and discontinue with Abilify titration, with Abilify titrated to 15mg PO at bedtime.  Case discussed with unit chief, Dr. Zapata and patient may benefit from Prolixin Decanoate + Aristada 1064mg IM without Lithium as patient tends to be noncompliant with PO medication in the community  >Social: milieu therapy  >Treatment: Group therapy, coping skills development for emotion management and mood regulation. Motivational counseling for substance abuse related issues.   >Continue to provide therapeutic interventions in support of treatment goals.  >Dispo: Continues to require inpatient level of care for treatment. Upon improvement in patients functioning the patient will be appropriate for step down to the outpatient level of care.

## 2021-02-17 PROCEDURE — 99232 SBSQ HOSP IP/OBS MODERATE 35: CPT

## 2021-02-17 RX ORDER — TUBERCULIN PURIFIED PROTEIN DERIVATIVE 5 [IU]/.1ML
5 INJECTION, SOLUTION INTRADERMAL ONCE
Refills: 0 | Status: COMPLETED | OUTPATIENT
Start: 2021-02-17 | End: 2021-02-17

## 2021-02-17 RX ADMIN — FLUPHENAZINE HYDROCHLORIDE 30 MILLIGRAM(S): 1 TABLET, FILM COATED ORAL at 20:24

## 2021-02-17 RX ADMIN — TUBERCULIN PURIFIED PROTEIN DERIVATIVE 5 UNIT(S): 5 INJECTION, SOLUTION INTRADERMAL at 13:19

## 2021-02-17 RX ADMIN — ARIPIPRAZOLE 15 MILLIGRAM(S): 15 TABLET ORAL at 20:24

## 2021-02-17 RX ADMIN — LITHIUM CARBONATE 900 MILLIGRAM(S): 300 TABLET, EXTENDED RELEASE ORAL at 20:25

## 2021-02-17 RX ADMIN — Medication 2 MILLIGRAM(S): at 20:26

## 2021-02-17 RX ADMIN — Medication 5 MILLIGRAM(S): at 20:24

## 2021-02-17 NOTE — BH INPATIENT PSYCHIATRY PROGRESS NOTE - NSBHFUPINTERVALHXFT_PSY_A_CORE
Pt compliant with medication and tolerating it well.  No events reported overnight.  Pt remains agreeable to do inpatient rehab and would like to be discharged next week.  Pt denies SI/HI/I/P or AH/VH or paranoia.  Pt reports eating and sleeping well.

## 2021-02-18 PROCEDURE — 99232 SBSQ HOSP IP/OBS MODERATE 35: CPT

## 2021-02-18 RX ORDER — LITHIUM CARBONATE 300 MG/1
600 TABLET, EXTENDED RELEASE ORAL AT BEDTIME
Refills: 0 | Status: DISCONTINUED | OUTPATIENT
Start: 2021-02-18 | End: 2021-02-19

## 2021-02-18 RX ADMIN — Medication 5 MILLIGRAM(S): at 20:56

## 2021-02-18 RX ADMIN — ARIPIPRAZOLE 15 MILLIGRAM(S): 15 TABLET ORAL at 20:55

## 2021-02-18 RX ADMIN — Medication 2 MILLIGRAM(S): at 20:55

## 2021-02-18 RX ADMIN — LITHIUM CARBONATE 600 MILLIGRAM(S): 300 TABLET, EXTENDED RELEASE ORAL at 20:56

## 2021-02-18 RX ADMIN — Medication 100 MILLIGRAM(S): at 16:01

## 2021-02-18 RX ADMIN — FLUPHENAZINE HYDROCHLORIDE 30 MILLIGRAM(S): 1 TABLET, FILM COATED ORAL at 20:55

## 2021-02-18 RX ADMIN — Medication 50 MILLIGRAM(S): at 20:56

## 2021-02-18 RX ADMIN — Medication 100 MILLIGRAM(S): at 20:56

## 2021-02-18 RX ADMIN — Medication 1 PATCH: at 08:40

## 2021-02-18 NOTE — BH INPATIENT PSYCHIATRY PROGRESS NOTE - NSBHFUPINTERVALHXFT_PSY_A_CORE
Pt compliant with medication and tolerating it well.  No events reported overnight.  Pt asking if he will be discharged to rehab next week, feels he is ready to go.  No updates on rehab acceptance yet, let patient know.  Pt denies SI/HI/I/P or AH/VH or paranoia.  Pt eating and sleeping well.

## 2021-02-18 NOTE — BH TREATMENT PLAN - NSTXDISORGINTERPR_PSY_ALL_CORE
Pt was able to identify 3 healthy and effective coping skills of walking, eating food, and reading. Pt's goal was discontinued.
Pt was able to identify 3 healthy and effective coping skills of walking, eating food, and reading. Pt's goal was discontinued.
Psych rehab staff will continue to meet with patient daily to build theraputic rapport and assist pt in psych rehab goal pertaining to identifying healthy and effective coping skills to assist with organization for improved symptom management.
Pt will benefit from identifying healthy and effective coping skills for improved symptom managment within seven days.
Pt will benefit from identifying healthy and effective coping skills for improved symptom managment within seven days.

## 2021-02-18 NOTE — BH TREATMENT PLAN - NSTXDCOPNOINTERPR_PSY_ALL_CORE
Psych rehab staff will encourage pt to work with SW staff in exploring options for outpatient treatment. Pt will be compliant with outpatient treatment.
Psych rehab staff will encourage pt to work with SW staff in exploring options for outpatient treatment. Pt will be compliant with outpatient treatment.

## 2021-02-18 NOTE — BH INPATIENT PSYCHIATRY PROGRESS NOTE - NSBHASSESSSUMMFT_PSY_ALL_CORE
>Obs: Routine, no current SI. no need for CO, patient not expected to pose risk to self or others in controlled inpatient setting  >Psychiatric Meds: Titrate Prolixin to 30mg PO at bedtime, Lithobid tapered down to 600mg PO at bedtime plan to taper down and discontinue with Abilify titration, with Abilify titrated to 15mg PO at bedtime.  Case discussed with unit chief, Dr. Zapata and patient may benefit from Prolixin Decanoate + Aristada 1064mg IM without Lithium as patient tends to be noncompliant with PO medication in the community  >Social: milieu therapy  >Treatment: Group therapy, coping skills development for emotion management and mood regulation. Motivational counseling for substance abuse related issues.   >Continue to provide therapeutic interventions in support of treatment goals.  >Dispo: Continues to require inpatient level of care for treatment. Upon improvement in patients functioning the patient will be appropriate for step down to the outpatient level of care.

## 2021-02-19 PROCEDURE — 99232 SBSQ HOSP IP/OBS MODERATE 35: CPT

## 2021-02-19 RX ORDER — LITHIUM CARBONATE 300 MG/1
600 TABLET, EXTENDED RELEASE ORAL AT BEDTIME
Refills: 0 | Status: COMPLETED | OUTPATIENT
Start: 2021-02-19 | End: 2021-02-20

## 2021-02-19 RX ADMIN — FLUPHENAZINE HYDROCHLORIDE 30 MILLIGRAM(S): 1 TABLET, FILM COATED ORAL at 20:39

## 2021-02-19 RX ADMIN — Medication 100 MILLIGRAM(S): at 20:39

## 2021-02-19 RX ADMIN — ARIPIPRAZOLE 15 MILLIGRAM(S): 15 TABLET ORAL at 20:38

## 2021-02-19 RX ADMIN — Medication 2 MILLIGRAM(S): at 20:38

## 2021-02-19 RX ADMIN — Medication 5 MILLIGRAM(S): at 20:39

## 2021-02-19 RX ADMIN — LITHIUM CARBONATE 600 MILLIGRAM(S): 300 TABLET, EXTENDED RELEASE ORAL at 20:39

## 2021-02-19 NOTE — BH INPATIENT PSYCHIATRY PROGRESS NOTE - NSBHFUPINTERVALHXFT_PSY_A_CORE
Pt compliant with medication and tolerating it well.  No events reported overnight.  Pt hoping for discharge to rehab next week.  Pt continues to deny SI/HI/I/P or AH/VH or paranoia.  Pt eating and sleeping well.  Pt understands if he does not complete rehab and continue to engage in treatment [facility]

## 2021-02-19 NOTE — BH INPATIENT PSYCHIATRY PROGRESS NOTE - NSBHASSESSSUMMFT_PSY_ALL_CORE
>Obs: Routine, no current SI. no need for CO, patient not expected to pose risk to self or others in controlled inpatient setting  >Psychiatric Meds: Titrate Prolixin to 30mg PO at bedtime, Lithobid tapered down to 600mg PO at bedtime plan to taper down and discontinue in 2 days with Abilify titration, with Abilify titrated to 15mg PO at bedtime.  Case discussed with unit chief, Dr. Zapata and patient may benefit from Prolixin Decanoate + Aristada 1064mg IM without Lithium as patient tends to be noncompliant with PO medication in the community  >Social: milieu therapy  >Treatment: Group therapy, coping skills development for emotion management and mood regulation. Motivational counseling for substance abuse related issues.   >Continue to provide therapeutic interventions in support of treatment goals.  >Dispo: awaiting inpatient rehab acceptance

## 2021-02-20 RX ADMIN — Medication 50 MILLIGRAM(S): at 20:11

## 2021-02-20 RX ADMIN — Medication 1 PATCH: at 09:15

## 2021-02-20 RX ADMIN — ARIPIPRAZOLE 15 MILLIGRAM(S): 15 TABLET ORAL at 20:12

## 2021-02-20 RX ADMIN — Medication 100 MILLIGRAM(S): at 08:31

## 2021-02-20 RX ADMIN — Medication 2 MILLIGRAM(S): at 20:12

## 2021-02-20 RX ADMIN — Medication 50 MILLIGRAM(S): at 08:30

## 2021-02-20 RX ADMIN — Medication 100 MILLIGRAM(S): at 17:47

## 2021-02-20 RX ADMIN — LITHIUM CARBONATE 300 MILLIGRAM(S): 300 TABLET, EXTENDED RELEASE ORAL at 20:12

## 2021-02-20 RX ADMIN — TUBERCULIN PURIFIED PROTEIN DERIVATIVE 5 UNIT(S): 5 INJECTION, SOLUTION INTRADERMAL at 09:19

## 2021-02-20 RX ADMIN — FLUPHENAZINE HYDROCHLORIDE 10 MILLIGRAM(S): 1 TABLET, FILM COATED ORAL at 20:11

## 2021-02-20 RX ADMIN — Medication 5 MILLIGRAM(S): at 20:12

## 2021-02-21 RX ADMIN — FLUPHENAZINE HYDROCHLORIDE 30 MILLIGRAM(S): 1 TABLET, FILM COATED ORAL at 20:03

## 2021-02-21 RX ADMIN — ARIPIPRAZOLE 15 MILLIGRAM(S): 15 TABLET ORAL at 20:03

## 2021-02-21 RX ADMIN — Medication 5 MILLIGRAM(S): at 20:03

## 2021-02-21 RX ADMIN — Medication 2 MILLIGRAM(S): at 20:03

## 2021-02-22 PROCEDURE — 99232 SBSQ HOSP IP/OBS MODERATE 35: CPT | Mod: 25

## 2021-02-22 PROCEDURE — 90853 GROUP PSYCHOTHERAPY: CPT

## 2021-02-22 RX ORDER — LITHIUM CARBONATE 300 MG/1
600 TABLET, EXTENDED RELEASE ORAL AT BEDTIME
Refills: 0 | Status: DISCONTINUED | OUTPATIENT
Start: 2021-02-22 | End: 2021-02-24

## 2021-02-22 RX ORDER — CHLORPROMAZINE HCL 10 MG
100 TABLET ORAL ONCE
Refills: 0 | Status: DISCONTINUED | OUTPATIENT
Start: 2021-02-22 | End: 2021-02-22

## 2021-02-22 RX ADMIN — ARIPIPRAZOLE 15 MILLIGRAM(S): 15 TABLET ORAL at 20:06

## 2021-02-22 RX ADMIN — FLUPHENAZINE HYDROCHLORIDE 10 MILLIGRAM(S): 1 TABLET, FILM COATED ORAL at 20:05

## 2021-02-22 RX ADMIN — Medication 50 MILLIGRAM(S): at 10:54

## 2021-02-22 RX ADMIN — Medication 2 MILLIGRAM(S): at 20:05

## 2021-02-22 RX ADMIN — Medication 50 MILLIGRAM(S): at 20:05

## 2021-02-22 RX ADMIN — LITHIUM CARBONATE 300 MILLIGRAM(S): 300 TABLET, EXTENDED RELEASE ORAL at 20:05

## 2021-02-22 RX ADMIN — Medication 100 MILLIGRAM(S): at 10:54

## 2021-02-22 RX ADMIN — Medication 2 MILLIGRAM(S): at 20:06

## 2021-02-22 RX ADMIN — Medication 5 MILLIGRAM(S): at 20:05

## 2021-02-22 NOTE — BH INPATIENT PSYCHIATRY PROGRESS NOTE - NSBHFUPINTERVALHXFT_PSY_A_CORE
Pt compliant with medication and tolerating it well.  Per report, patient has not slept over the weekend and seems to be getting more manic and disorganized.  Pt observed in the mileu loud and intrusive.  Pt asks to speak with writer, is irritable, talks about his frustration still being in the hospital and how is not "a danger to myself or others."  Pt inquires if someone comes after him with a knife and he does not defend himself, "does that make me a danger to myself."  Pt asks writer not to speak then goes on to talk about how when he was at [facility] in the past he was forced to be "dangerous" to protect himself from negative things occurring there.  Pt reports, he is no longer "a dangerous person and I try to be a good person, I am good at heart."  Pt with some grandiosity, talking about how he enrolled in the  after [facility] and "knows how to act."  Pt denies current SI/HI/I/P or AH/VH.  Pt then storms out of the room.  Later, patient continued to be loud and irritable, accepted PO PRN medication.  Writer asked patient if he will take Aristada and Prolixin RENO today and patient reported he will not do it today, due to getting PRN medication and does not want to "be overmedicated."  Pt agrees to take them within the next two days.

## 2021-02-22 NOTE — BH INPATIENT PSYCHIATRY PROGRESS NOTE - NSBHASSESSSUMMFT_PSY_ALL_CORE
>Obs: Routine, no current SI. no need for CO, patient not expected to pose risk to self or others in controlled inpatient setting  >Psychiatric Meds: Titrate Prolixin to 30mg PO at bedtime, Lithobid added back at 600mg PO at bedtime, with Abilify titrated to 15mg PO at bedtime.  Case discussed with unit chief, Dr. Zapata and patient may benefit from Prolixin Decanoate 37.5mg IM + Aristada 1064mg IM to be given this week  >Social: milieu therapy  >Treatment: Group therapy, coping skills development for emotion management and mood regulation. Motivational counseling for substance abuse related issues.   >Continue to provide therapeutic interventions in support of treatment goals.  >Dispo: awaiting inpatient rehab acceptance

## 2021-02-23 PROCEDURE — 99232 SBSQ HOSP IP/OBS MODERATE 35: CPT

## 2021-02-23 RX ORDER — LANOLIN ALCOHOL/MO/W.PET/CERES
5 CREAM (GRAM) TOPICAL AT BEDTIME
Refills: 0 | Status: DISCONTINUED | OUTPATIENT
Start: 2021-02-23 | End: 2021-03-05

## 2021-02-23 RX ORDER — FLUPHENAZINE HYDROCHLORIDE 1 MG/1
37.5 TABLET, FILM COATED ORAL ONCE
Refills: 0 | Status: COMPLETED | OUTPATIENT
Start: 2021-02-23 | End: 2021-02-23

## 2021-02-23 RX ORDER — FLUPHENAZINE HYDROCHLORIDE 1 MG/1
15 TABLET, FILM COATED ORAL AT BEDTIME
Refills: 0 | Status: DISCONTINUED | OUTPATIENT
Start: 2021-02-23 | End: 2021-03-05

## 2021-02-23 RX ORDER — CHLORPROMAZINE HCL 10 MG
100 TABLET ORAL ONCE
Refills: 0 | Status: COMPLETED | OUTPATIENT
Start: 2021-02-23 | End: 2021-02-23

## 2021-02-23 RX ORDER — ARIPIPRAZOLE 15 MG/1
1064 TABLET ORAL ONCE
Refills: 0 | Status: COMPLETED | OUTPATIENT
Start: 2021-02-23 | End: 2021-02-23

## 2021-02-23 RX ADMIN — Medication 100 MILLIGRAM(S): at 12:24

## 2021-02-23 RX ADMIN — ARIPIPRAZOLE 15 MILLIGRAM(S): 15 TABLET ORAL at 20:49

## 2021-02-23 RX ADMIN — Medication 1 PATCH: at 21:12

## 2021-02-23 RX ADMIN — Medication 5 MILLIGRAM(S): at 20:49

## 2021-02-23 RX ADMIN — LITHIUM CARBONATE 300 MILLIGRAM(S): 300 TABLET, EXTENDED RELEASE ORAL at 20:49

## 2021-02-23 RX ADMIN — FLUPHENAZINE HYDROCHLORIDE 5 MILLIGRAM(S): 1 TABLET, FILM COATED ORAL at 20:49

## 2021-02-23 RX ADMIN — Medication 1 PATCH: at 08:32

## 2021-02-23 RX ADMIN — FLUPHENAZINE HYDROCHLORIDE 37.5 MILLIGRAM(S): 1 TABLET, FILM COATED ORAL at 16:45

## 2021-02-23 RX ADMIN — Medication 100 MILLIGRAM(S): at 18:47

## 2021-02-23 RX ADMIN — Medication 3 MILLIGRAM(S): at 18:47

## 2021-02-23 RX ADMIN — Medication 2 MILLIGRAM(S): at 12:24

## 2021-02-23 RX ADMIN — Medication 2 MILLIGRAM(S): at 20:49

## 2021-02-23 RX ADMIN — Medication 50 MILLIGRAM(S): at 12:24

## 2021-02-23 RX ADMIN — ARIPIPRAZOLE 1064 MILLIGRAM(S): 15 TABLET ORAL at 16:45

## 2021-02-23 NOTE — BH INPATIENT PSYCHIATRY PROGRESS NOTE - NSBHMSESPABN_PSY_A_CORE
secondary to sedation/Loud volume/Increased productivity secondary to sedation/Increased productivity

## 2021-02-23 NOTE — BH INPATIENT PSYCHIATRY PROGRESS NOTE - NSBHASSESSSUMMFT_PSY_ALL_CORE
>Obs: Routine, no current SI. no need for CO, patient not expected to pose risk to self or others in controlled inpatient setting  >Psychiatric Meds: Titrate Prolixin to 30mg PO at bedtime, Lithobid added back at 600mg PO at bedtime, with Abilify titrated to 15mg PO at bedtime.  Case discussed with unit chief, Dr. Zapata and patient may benefit from Prolixin Decanoate 37.5mg IM + Aristada 1064mg IM to be given today.  Case also discussed with pharmacist, Dr. Beckwith, who researched medications and interactions and both can be given on same day in different sites  >Social: milieu therapy  >Treatment: Group therapy, coping skills development for emotion management and mood regulation. Motivational counseling for substance abuse related issues.   >Continue to provide therapeutic interventions in support of treatment goals.  >Dispo: awaiting inpatient rehab acceptance   >Obs: Routine, no current SI. no need for CO, patient not expected to pose risk to self or others in controlled inpatient setting  >Psychiatric Meds: Titrate Prolixin to 30mg PO at bedtime, Lithobid added back at 600mg PO at bedtime, with Abilify titrated to 15mg PO at bedtime and add Melatonin 5mg PO at bedtime.  Case discussed with unit chief, Dr. Zapata and patient may benefit from Prolixin Decanoate 37.5mg IM + Aristada 1064mg IM to be given today.  Case also discussed with pharmacist, Dr. Beckwith, who researched medications and interactions and both can be given on same day in different sites  >Social: milieu therapy  >Treatment: Group therapy, coping skills development for emotion management and mood regulation. Motivational counseling for substance abuse related issues.   >Continue to provide therapeutic interventions in support of treatment goals.  >Dispo: awaiting inpatient rehab acceptance

## 2021-02-23 NOTE — BH INPATIENT PSYCHIATRY PROGRESS NOTE - NSBHFUPINTERVALHXFT_PSY_A_CORE
Pt compliant with medication and tolerating it well.  No events reported overnight.  Pt reports to writer he is going to have a better day today and get less "hot."  Pt reported he had 4 minutes of frustration earlier and hopes to have no more.  Pt reports he is confused as to why he has not gone to rehab yet and SW and writer explained the process.  Pt denies SI/HI/I/P or AH/VH.  Pt less tangential than yesterday and able to have more logical conversation, but is intrusive at times on the unit.  Pt reports poor sleep again last night and asks for Melatonin.  Pt agreeable to receive Prolixin and Aristada RENO today.

## 2021-02-24 PROCEDURE — 99232 SBSQ HOSP IP/OBS MODERATE 35: CPT

## 2021-02-24 RX ORDER — CHLORPROMAZINE HCL 10 MG
100 TABLET ORAL ONCE
Refills: 0 | Status: DISCONTINUED | OUTPATIENT
Start: 2021-02-24 | End: 2021-02-26

## 2021-02-24 RX ORDER — LITHIUM CARBONATE 300 MG/1
900 TABLET, EXTENDED RELEASE ORAL AT BEDTIME
Refills: 0 | Status: DISCONTINUED | OUTPATIENT
Start: 2021-02-24 | End: 2021-02-26

## 2021-02-24 RX ORDER — ARIPIPRAZOLE 15 MG/1
15 TABLET ORAL AT BEDTIME
Refills: 0 | Status: DISCONTINUED | OUTPATIENT
Start: 2021-02-24 | End: 2021-03-04

## 2021-02-24 RX ADMIN — Medication 1 PATCH: at 09:32

## 2021-02-24 RX ADMIN — Medication 50 MILLIGRAM(S): at 20:27

## 2021-02-24 RX ADMIN — Medication 1 PATCH: at 09:33

## 2021-02-24 RX ADMIN — Medication 2 MILLIGRAM(S): at 20:23

## 2021-02-24 RX ADMIN — LITHIUM CARBONATE 900 MILLIGRAM(S): 300 TABLET, EXTENDED RELEASE ORAL at 20:24

## 2021-02-24 RX ADMIN — Medication 2 MILLIGRAM(S): at 20:24

## 2021-02-24 RX ADMIN — FLUPHENAZINE HYDROCHLORIDE 15 MILLIGRAM(S): 1 TABLET, FILM COATED ORAL at 20:23

## 2021-02-24 RX ADMIN — ARIPIPRAZOLE 15 MILLIGRAM(S): 15 TABLET ORAL at 20:23

## 2021-02-24 RX ADMIN — Medication 5 MILLIGRAM(S): at 20:24
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
## 2021-02-24 NOTE — BH INPATIENT PSYCHIATRY PROGRESS NOTE - NSBHASSESSSUMMFT_PSY_ALL_CORE
>Obs: Routine, no current SI. no need for CO, patient not expected to pose risk to self or others in controlled inpatient setting  >Psychiatric Meds: Taper down Prolixin to 15 PO at bedtime as patient received RENO, Lithobid added back and titrated to 900mg PO at bedtime, with Abilify titrated to 15mg PO at bedtime and add Melatonin 5mg PO at bedtime.  Case discussed with unit chief, Dr. Zapata and patient may benefit from Prolixin Decanoate 37.5mg IM every 3-4 weeks + Aristada 1064mg IM every 6-8 weeks given on 2/23/21.  Case also discussed with pharmacist, Dr. Beckwith, who researched medications and interactions and both can be given on same day in different sites  >Social: milieu therapy  >Treatment: Group therapy, coping skills development for emotion management and mood regulation. Motivational counseling for substance abuse related issues.   >Continue to provide therapeutic interventions in support of treatment goals.  >Dispo: awaiting inpatient rehab acceptance

## 2021-02-24 NOTE — BH INPATIENT PSYCHIATRY PROGRESS NOTE - NSTXCOPEGOALOTHER_PSY_ALL_CORE
Identify 2 effective coping methods to cope with stressors in the community for symptom relapse prevention planning.

## 2021-02-25 PROCEDURE — 99232 SBSQ HOSP IP/OBS MODERATE 35: CPT

## 2021-02-25 RX ADMIN — Medication 50 MILLIGRAM(S): at 20:37

## 2021-02-25 RX ADMIN — Medication 2 MILLIGRAM(S): at 13:34

## 2021-02-25 RX ADMIN — Medication 2 MILLIGRAM(S): at 20:37

## 2021-02-25 RX ADMIN — LITHIUM CARBONATE 900 MILLIGRAM(S): 300 TABLET, EXTENDED RELEASE ORAL at 20:36

## 2021-02-25 RX ADMIN — Medication 5 MILLIGRAM(S): at 20:35

## 2021-02-25 RX ADMIN — Medication 2 MILLIGRAM(S): at 20:36

## 2021-02-25 RX ADMIN — ARIPIPRAZOLE 15 MILLIGRAM(S): 15 TABLET ORAL at 20:35

## 2021-02-25 RX ADMIN — Medication 1 PATCH: at 08:52

## 2021-02-25 RX ADMIN — FLUPHENAZINE HYDROCHLORIDE 15 MILLIGRAM(S): 1 TABLET, FILM COATED ORAL at 20:36

## 2021-02-25 NOTE — BH TREATMENT PLAN - NSTXMANICINTERMD_PSY_ALL_CORE
Invega started with Seroquel, added Depakote ER
Lithium and Prolixin
Lithium and Prolixin
Invega started with Seroquel, added Depakote ER
Lithium and Prolixin
Lithium and Prolixin and Abilify

## 2021-02-25 NOTE — BH TREATMENT PLAN - NSTXPSYCHOINTERRN_PSY_ALL_CORE
Assess for A/V hallucinations and other signs of psychosis. Encourage verbalization of thoughts and feelings.
Encourage patient to share thoughts and feelings with nursing staff. Reinforce positive coping skills.
Assess for signs of psychosis and maintain patient safety.
Assess for signs of psychosis, including AH/VH/CAH. Maintain patient safety.

## 2021-02-25 NOTE — BH INPATIENT PSYCHIATRY PROGRESS NOTE - NSBHFUPINTERVALHXFT_PSY_A_CORE
Pt compliant with medication and tolerating it well.  No events reported overnight.  Pt remains intrusive, grabbing writer when speaking to others.  Writer set boundaries with patient.  Pt denies SI/HI/I/P or AH/VH.  Pt reports eating and sleeping well.  Pt reports, "I want to be the best I can be."  Pt talks about how he wanted to join the  in the past, but his mom was very negative and not supportive towards him and made fun of him for it.  Pt illogical at times.

## 2021-02-25 NOTE — BH TREATMENT PLAN - NSTXMANICINTERRN_PSY_ALL_CORE
Assess for signs of maurice and encourage proper sleep as well as educate on the importance of continued medication compliance.
Assess mood/behavior, provide redirection, and reinforce medication teaching.
Monitor for signs of maurice and educate on the importance of medication compliance.
Educate patient on positive coping skills to address manic behaviors. Encourage regular sleeping pattern.

## 2021-02-25 NOTE — BH TREATMENT PLAN - NSTXCOPEINTERPR_PSY_ALL_CORE
Patient has maintained medication compliance and attended approximately 75% of daily psychiatric rehabilitation groups and though he remains intermittently disruptive and hyperactive in environment, he has responded more favorably to redirection, therein. Patient endorsed having a way to occupy himself as a benefit of group attendance.       Patient would benefit from identifying 2 effective coping methods to cope with stressors in the community for symptom relapse prevention planning over the next 7 days. Psychiatric rehabilitation staff will continue to provide daily psychoeducation rounding, individual supportive counseling sessions, and group therapy sessions where CoVid-19 mandates permit, to assist patient in achieving treatment goals.
Patient will continue to work collaboratively with psychiatric rehabilitation staff in effort to identify two personally relevant and effective coping methods to help manage stressors and to avert symptom relapse in the community.

## 2021-02-25 NOTE — BH TREATMENT PLAN - NSTXCOPEGOAL_PSY_ALL_CORE
Identify and utilize 2 coping skills that meet their needs
Identify and utilize 2 coping skills that meet their needs

## 2021-02-25 NOTE — BH TREATMENT PLAN - NSTXPROBPSYCHO_PSY_ALL_CORE
PSYCHOTIC SYMPTOMS

## 2021-02-25 NOTE — BH TREATMENT PLAN - NSTXIMPULSGOAL_PSY_ALL_CORE
Will be able to describe/demonstrate alternative ways of managing his/her emotional state on the unit
Will be able to demonstrate the ability to pause before acting out negatively
Will identify 1 behavior to cope with impulsive urges

## 2021-02-25 NOTE — BH TREATMENT PLAN - NSBHPRIMARYDX_PSY_ALL_CORE
Schizophrenia    

## 2021-02-25 NOTE — BH TREATMENT PLAN - NSTXPSYCHOGOAL_PSY_ALL_CORE
Will be able to report experiencing hallucinations to staff
Will identify 1 trigger/stressor that exacerbates thoughts
Will engage in a 15 minute conversation with no irrational content
Will identify 1 trigger/stressor that exacerbates thoughts
Make at least 5 reality based statements/requests to staff and/or peers
Will verbalize 1 strategy to successfully cope with psychotic symptoms

## 2021-02-25 NOTE — BH TREATMENT PLAN - NSTXPATIENTPARTICIPATE_PSY_ALL_CORE
Patient participated in development of after care plan
Patient participated in development of after care plan
Patient participated in identification of needs/problems/goals for treatment
Patient participated in identification of needs/problems/goals for treatment/Patient participated in defining interventions
Patient participated in defining interventions
Patient participated in development of after care plan

## 2021-02-25 NOTE — UM REPORT PROGRESS NOTE - NSUMSWPRIORDCCOM_GEN_A_CORE FT
Sw has submitted several referral to I/p rehab, however pt is noted to be in need of higher level of care. Sw has approx 4 other referrals pending status at this time. As per recent discussion pt's alternate plan of care is state. Tx team is still in pursuit of I/p rehab at this time. pt currently is undomiciled and has ACT and AOT. Pt has completed recent telephonic AOT hearing and is noted to have his AOT order extend 1yr. As per ACT team pt has active SPA.  Sw has submitted several referral to I/p rehab, with pt being accepted to [facility] this week; projected d/c for 3/5/21.  Collateral meeting with AOT and ACT has been requested for 3/3/21 @ 1:00p, and Sw is awaiting confirmation from pt's community supports. As per recent discussion of alternate plan of care of state; this has been reexplored and tx team in in agreement that pt could benefit from I/p rehab and does not meet criteria for state at this time.  Pt currently is undomiciled and has ACT and AOT. Pt has completed recent telephonic AOT hearing last week and is noted to have his AOT order extend 1yr. As per ACT team pt has active SPA.

## 2021-02-25 NOTE — UM REPORT PROGRESS NOTE - NSUMSWBARRIERS_GEN_A_CORE
Placement issue... as per discussion with Charity at [facility] pt has been accepted to I/p rehab and will conduct COVID testing tomorrow, 3/3/21 w/ d/c date for 3/5/21./No barriers

## 2021-02-25 NOTE — UM REPORT PROGRESS NOTE - NSUMSWACTION_GEN_A_CORE FT
Vannesa has conducted phone screening with pt for rehab which he was accpeted to and Vannesa is attempting to coordinate conference with ACT and AOT for 3/3/21 to discus d/c.

## 2021-02-25 NOTE — BH TREATMENT PLAN - NSTXPROBIMPULS_PSY_ALL_CORE
IMPULSIVITY/AGITATION

## 2021-02-25 NOTE — BH TREATMENT PLAN - NSTXDISORGINTERRN_PSY_ALL_CORE
Provide reality orientation/redirection as necessary. Offer and administer medication as per provider orders to manage/treat psychiatric symptoms. Encourage patient to verbalize thoughts, feelings, and/or concerns to staff
Encourage patient to share thoughts and feelings with nursing staff.
reorient the patient time to time and maintaine safety.
Assess thought process and maintain patient/other safety.
Assess thought process, provide redirection, and maintain patient/other safety.

## 2021-02-25 NOTE — BH TREATMENT PLAN - NSTXIMPULSINTERMD_PSY_ALL_CORE
Prolixin with Lithobid
Invega started with Seroquel, added Depakote ER
Prolixin with Lithobid
Prolixin and Abilify with Lithobid

## 2021-02-25 NOTE — UM REPORT PROGRESS NOTE - NSUMSWNOTE_GEN_A_CORE FT
Pt appears to be at baseline, has agreed and received his two Juan with recent acceptance to I/p rehab. Pt is projected to d/c this Friday, 3/5/21.

## 2021-02-25 NOTE — BH TREATMENT PLAN - NSTXDCOPNOINTERMD_PSY_ALL_CORE
SW to coordinate care, has ACT and AOT

## 2021-02-25 NOTE — BH TREATMENT PLAN - NSTXPLANTHERAPYSESSIONSFT_PSY_ALL_CORE
02-19-21  Type of therapy: Addiction education,Anger management,Cognitive behavior therapy,Coping skills,Creative arts therapy,Daily living skills,Music therapy,Peer advocate,Psychoeducation,Relapse prevention,Stress management  Type of session: Individual  Level of patient participation: Not engaged,Resistance to participation  Duration of participation: Less than 15 minutes  Therapy conducted by: Psych rehab  Therapy Summary: Patient was initially receptive to meeting with writer.  Writer articulated role and indicated that she was a covering staff member supporting patient rounds/care.  Upon requesting to discuss patient progress during hospitalization, patient stated "get out of here, I don't want to discuss my progress.  You should know my progress".  Upon becoming visibly irritable, writer gently indicated that she would meet with patient at a later time and obtained collateral information regarding patients progress and behavior via attending treatment team.  Progress interview was terminated due to patients unwillingness to enage and as a safety precaution.  Writer and patients typically attending rehabilitative staff will follow-up with patient when he is demonstrating less lability.    Patient continues to attend the majority of group offerings and is social with select peers and staff.  Patient remains medication compliant.  Patient continues to demonstrate intermittent lability/irritability, however has begun to also demonstrate some improved mood and behavioral control.  Patients insight and judgement remain impaired per attending treatment team assessment.  This writer was unable to meaningfully assess patients ability to identify personally relevant coping skills for relapse prevention at this time due to patients unwillingness to participate in session.  Patient will be re-assessed when agreeable to meeting.     Per attending treatment team, patient is not presenting with SI/I/P or HI/I/P.  During breif observation, patients appearance was fair.  Patient was in casual personal clothing.  
  01-22-21  Type of therapy: Addiction education,Anger management,Coping skills,Health and fitness,Medication management  Type of session: Individual  Level of patient participation: Not engaged  Duration of participation: 15 minutes  Therapy conducted by: Nursing  --    01-22-21  Type of therapy: Cognitive behavior therapy  Type of session: Individual  Level of patient participation: Writer attempted to meet with pt however pt was sleeping and unable to be roused.   Duration of participation: Less than 15 minutes  Therapy conducted by: Psych rehab  Therapy Summary: Psych rehab staff attempted to meet with patient however patient was sleeping and unable to be roused therefore the following information was obtained from patient's chart.  As per patient's chart, patient denied SI/HI and refused to answer questions regarding AH/VH.  Patient is observed in hospital gowns and is malodorous.     Psych rehab staff will continue to engage patient daily to build theraputic rapport.  
  02-11-21  Type of therapy: Cognitive behavior therapy,Dialectical behavior therapy,Coping skills,Creative arts therapy,Medication management,Music therapy,Psychoeducation,Safety planning,Symptom management  Type of session: Individual  Level of patient participation: Participated with encouragement  Duration of participation: 15 minutes  Therapy conducted by: Psych rehab  Therapy Summary: Patient has maintained medication compliance, and he denied side effects as well as issues with sleep patterns or appetite. Patient has been less demanding during staff interactions, and less intrusive with peers, though irritability remains on an intermittent basis. Patient has yet to speak of any concrete plans for his future but he stated that he will maintain medication/treatment compliance. Patient is less preoccupied with discharge but insight into symptom management and overall circumstances remains poor and writer has been unable to have meaningful conversations with patient about skill development or recovery goals. Patient agreed to appropriately verbalize needs/concerns to staff and to otherwise participate in his psychiatric treatment to the best of his ability. Patient’s appearance/hygiene has also shown improvement as has his overall state since most recent progress assessment. Patient's ability to collaborate on an appropriate psychiatric rehabilitation plan was limited during session with writer; previously it was very poor.  
  01-29-21  Type of therapy: Anger management,Cognitive behavior therapy  Type of session: Group  Level of patient participation: Disruptive,Pt has difficulty maintaining behavioral control during psychoeducation groups.  Duration of participation: Less than 15 minutes  Therapy conducted by: Psych rehab  Therapy Summary: Psych rehab staff attempted to meet with pt to assess progress towards psych rehab goals. Pt was dismissive of writer and reported "it only takes one  to tango."  Pt walked away from writer.  Pt was unable to be assessed for HI/SI and AH/VH.      Writer will continue to engage pt daily to build theraputic rapport.  
  02-05-21  Type of therapy: Coping skills,Creative arts therapy,Music therapy,Psychoeducation  Type of session: Individual  Level of patient participation: Participated with encouragement  Duration of participation: 30 minutes  Therapy conducted by: Psych rehab  Therapy Summary: Psych rehab staff met with patient to assess progress towards rehabilitation goals from the past seven days. Pt was attentive and participated with encouragement.     Pt reported adequate sleep and appetite. Pt reported medication compliance. Patient denied suicidal and homicidal ideation/intent/plan. Patient denied auditory and visual hallucinations. Pt was able to identify healthy and effective coping skills such as walking, reading, and eating.     Pt is observed social on the unit and with peers. Pt has attended 80% of psychoeducational groups. Pt is observed brighter in affect and is more organized in thought process and was able to converse appropirately with writer.    Pt inquired with interest about attending an AA meeting. Writer will follow up with AA staff via email next week so pt can attend virtual meeting.  
  01-22-21  Type of therapy: Addiction education,Anger management,Coping skills,Health and fitness,Medication management  Type of session: Individual  Level of patient participation: Not engaged  Duration of participation: 15 minutes  Therapy conducted by: Nursing  --    01-22-21  Type of therapy: Cognitive behavior therapy  Type of session: Individual  Level of patient participation: Writer attempted to meet with pt however pt was sleeping and unable to be roused.   Duration of participation: Less than 15 minutes  Therapy conducted by: Psych rehab  Therapy Summary: Psych rehab staff attempted to meet with patient however patient was sleeping and unable to be roused therefore the following information was obtained from patient's chart.  As per patient's chart, patient denied SI/HI and refused to answer questions regarding AH/VH.  Patient is observed in hospital gowns and is malodorous.     Psych rehab staff will continue to engage patient daily to build theraputic rapport.

## 2021-02-25 NOTE — BH TREATMENT PLAN - NSTXMANICGOAL_PSY_ALL_CORE
Exhibit a substantial reduction in elated/angry acting out, and pressured speech that prevents mutual conversation
Exhibit a substantial reduction in elated/angry acting out, and pressured speech that prevents mutual conversation
Demonstrate a substantial reduction in both hyperactive pacing on the unit and social intrusiveness
Exhibit a substantial reduction in elated/angry acting out, and pressured speech that prevents mutual conversation
Exhibit a substantial reduction in elated/angry acting out, and pressured speech that prevents mutual conversation
State a reason daily why adherence to mood stabilizers is necessary

## 2021-02-25 NOTE — BH TREATMENT PLAN - NSTXDISORGINTERMD_PSY_ALL_CORE
Prolixin with Lithobid
Invega started with Seroquel, added Depakote ER
Prolixin and Abilify with Lithobid

## 2021-02-25 NOTE — BH TREATMENT PLAN - NSTXPROBDISORG_PSY_ALL_CORE
DISORGANIZATION OF THOUGHT/BEHAVIOR

## 2021-02-25 NOTE — BH TREATMENT PLAN - NSTXDCHOUSINTERSW_PSY_ALL_CORE
Pt has agreed to i/p rehab and has participated in d/c planning. ACT and AOT is advocating for state placement, however pt does not appear to meet criteria for state at this time, tx team is not in pursuit. Sw is exploring i/p rehab and residential programs at this time.

## 2021-02-25 NOTE — BH TREATMENT PLAN - NSTXDISORGGOAL_PSY_ALL_CORE
Will identify 2 coping skills that assist in organizing
Will identify 2 coping skills that assist in organizing
Will demonstrate related thoughts for 5 min in conversation
Will demonstrate related thoughts for 5 min in conversation
Will identify 2 coping skills that assist in organizing
Will verbalize 1 strategy to successfully cope with disturbed thinking
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
## 2021-02-25 NOTE — BH TREATMENT PLAN - NSTXIMPULSINTERRN_PSY_ALL_CORE
Encourage diversionary activities to redirect energy and improve socialization skills. Encourage ADLS.
Monitor and redirect pt. Provide PRN medications as needed. Encourage compliance with treatment plan. Maintain safe, supportive and structured environment.
Monitor behavior and monitor for impulsivity or agitation. Provide redirection and PRN medication as needed. Maintain patient as well as other safety.
Monitor and redirect pt. Provide PRN medications as needed. Encourage compliance with treatment plan. Maintain safe, supportive and structured environment.
Monitor for impulsive and agitated behaviors. Provide redirection and PRN medication as needed. Maintain patient and other safety.

## 2021-02-25 NOTE — BH TREATMENT PLAN - NSTXPSYCHOINTERMD_PSY_ALL_CORE
Prolixin with Abilify and Lithobid
Invega started with Seroquel, added Depakote ER
Prolixin and Lithobid

## 2021-02-25 NOTE — BH TREATMENT PLAN - NSTXCOPEINTERRN_PSY_ALL_CORE
Reinforce the use of therapeutic coping skills.
Encourage and reinforce the use of therapeutic coping skills.

## 2021-02-25 NOTE — BH TREATMENT PLAN - NSCMSPTSTRENGTHS_PSY_ALL_CORE
Compliance to treatment/Expressive of emotions/Sense of Humor
Patient was sleeping in the quiet room when SW attempted to meet with him./Courageous/Expressive of emotions/Physically healthy
Compliance to treatment/Expressive of emotions/Physically healthy
Patient was sleeping in the quiet room when SW attempted to meet with him./Courageous/Expressive of emotions/Physically healthy
Patient was sleeping in the quiet room when SW attempted to meet with him./Courageous/Expressive of emotions/Physically healthy

## 2021-02-26 PROCEDURE — 99232 SBSQ HOSP IP/OBS MODERATE 35: CPT

## 2021-02-26 RX ORDER — CHLORPROMAZINE HCL 10 MG
100 TABLET ORAL ONCE
Refills: 0 | Status: COMPLETED | OUTPATIENT
Start: 2021-02-26 | End: 2021-02-26

## 2021-02-26 RX ORDER — LITHIUM CARBONATE 300 MG/1
900 TABLET, EXTENDED RELEASE ORAL AT BEDTIME
Refills: 0 | Status: DISCONTINUED | OUTPATIENT
Start: 2021-02-26 | End: 2021-03-01

## 2021-02-26 RX ADMIN — Medication 100 MILLIGRAM(S): at 21:32

## 2021-02-26 RX ADMIN — LITHIUM CARBONATE 900 MILLIGRAM(S): 300 TABLET, EXTENDED RELEASE ORAL at 20:51

## 2021-02-26 RX ADMIN — Medication 1 PATCH: at 09:44

## 2021-02-26 RX ADMIN — Medication 50 MILLIGRAM(S): at 18:21

## 2021-02-26 RX ADMIN — FLUPHENAZINE HYDROCHLORIDE 15 MILLIGRAM(S): 1 TABLET, FILM COATED ORAL at 20:51

## 2021-02-26 RX ADMIN — Medication 1 PATCH: at 08:50

## 2021-02-26 RX ADMIN — ARIPIPRAZOLE 15 MILLIGRAM(S): 15 TABLET ORAL at 20:51

## 2021-02-26 RX ADMIN — Medication 2 MILLIGRAM(S): at 20:51

## 2021-02-26 RX ADMIN — Medication 2 MILLIGRAM(S): at 09:46

## 2021-02-26 RX ADMIN — Medication 1 PATCH: at 07:38

## 2021-02-26 RX ADMIN — Medication 5 MILLIGRAM(S): at 20:51

## 2021-02-26 RX ADMIN — Medication 2 MILLIGRAM(S): at 18:21

## 2021-02-26 NOTE — BH INPATIENT PSYCHIATRY PROGRESS NOTE - NSBHFUPINTERVALHXFT_PSY_A_CORE
Pt compliant with medication and tolerating it well.  No events reported overnight.  Pt more organized and less intrusive today.  Pt denies SI/HI/I/P or AH/VH.  Pt reports eating and sleeping well.  Pt informed he has a phone screening with a rehab today.  Pt reports he feels there is no use as no rehab has accepted him thus far, but is eventually agreeable to do it.

## 2021-02-26 NOTE — BH INPATIENT PSYCHIATRY PROGRESS NOTE - NSBHASSESSSUMMFT_PSY_ALL_CORE
>Obs: Routine, no current SI. no need for CO, patient not expected to pose risk to self or others in controlled inpatient setting  >Psychiatric Meds: Taper down Prolixin to 15 PO at bedtime as patient received RENO, Lithium added back and titrated to 900mg PO at bedtime, with Abilify titrated to 15mg PO at bedtime and add Melatonin 5mg PO at bedtime.  Case discussed with unit chief, Dr. Zapata and patient may benefit from Prolixin Decanoate 37.5mg IM every 3-4 weeks + Aristada 1064mg IM every 6-8 weeks given on 2/23/21.  Case also discussed with pharmacist, Dr. Beckwith, who researched medications and interactions and both can be given on same day in different sites  >Social: milieu therapy  >Treatment: Group therapy, coping skills development for emotion management and mood regulation. Motivational counseling for substance abuse related issues.   >Continue to provide therapeutic interventions in support of treatment goals.  >Dispo: awaiting inpatient rehab acceptance
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
## 2021-02-27 RX ADMIN — FLUPHENAZINE HYDROCHLORIDE 15 MILLIGRAM(S): 1 TABLET, FILM COATED ORAL at 20:21

## 2021-02-27 RX ADMIN — Medication 5 MILLIGRAM(S): at 20:20

## 2021-02-27 RX ADMIN — ARIPIPRAZOLE 15 MILLIGRAM(S): 15 TABLET ORAL at 20:20

## 2021-02-27 RX ADMIN — LITHIUM CARBONATE 900 MILLIGRAM(S): 300 TABLET, EXTENDED RELEASE ORAL at 20:20

## 2021-02-27 RX ADMIN — Medication 2 MILLIGRAM(S): at 20:20

## 2021-02-28 RX ADMIN — Medication 100 MILLIGRAM(S): at 20:07

## 2021-02-28 RX ADMIN — Medication 2 MILLIGRAM(S): at 20:07

## 2021-02-28 RX ADMIN — Medication 50 MILLIGRAM(S): at 20:07

## 2021-02-28 RX ADMIN — Medication 2 MILLIGRAM(S): at 20:08

## 2021-02-28 RX ADMIN — Medication 5 MILLIGRAM(S): at 20:08

## 2021-02-28 RX ADMIN — FLUPHENAZINE HYDROCHLORIDE 15 MILLIGRAM(S): 1 TABLET, FILM COATED ORAL at 20:08

## 2021-02-28 RX ADMIN — ARIPIPRAZOLE 15 MILLIGRAM(S): 15 TABLET ORAL at 20:08

## 2021-02-28 RX ADMIN — LITHIUM CARBONATE 900 MILLIGRAM(S): 300 TABLET, EXTENDED RELEASE ORAL at 20:08

## 2021-02-28 NOTE — BH CHART NOTE - NSEVENTNOTEFT_PSY_ALL_CORE
CHRISTINA called to perform psychiatric safety assessment on patient expressing acute suicidality.  Per staff, patient reported new-onset suicidal thoughts to family members, who then called the unit expressing concern for patient's safety.  On interview, patient is vague, guarded, somewhat disorganized, can neither confirm nor deny he is having thoughts of wanting to harm or kill himself.  States that he does not feel he is getting better, and wants to drink until he feels better or his symptoms go away.  Patient unable to tolerate complete interview, and wants to leave.  When asked directly if he has any plan or intent to harm himself or end his life on the unit, he makes a statement about not wanting to "waste my miracles on that."  Given that patient is unable to fully engage in interview, cannot give clear answers about SI/I/P, is vague, guarded, and making new statements concerning to family that are not at patient's baseline, he does require CO 1:1 at this time to remain safe. Discussed with RN staff to notify CHRISTINA with any worsening of symptoms.

## 2021-03-01 PROCEDURE — 90853 GROUP PSYCHOTHERAPY: CPT

## 2021-03-01 PROCEDURE — 99232 SBSQ HOSP IP/OBS MODERATE 35: CPT

## 2021-03-01 RX ORDER — LITHIUM CARBONATE 300 MG/1
1350 TABLET, EXTENDED RELEASE ORAL AT BEDTIME
Refills: 0 | Status: DISCONTINUED | OUTPATIENT
Start: 2021-03-01 | End: 2021-03-05

## 2021-03-01 RX ADMIN — ARIPIPRAZOLE 15 MILLIGRAM(S): 15 TABLET ORAL at 20:25

## 2021-03-01 RX ADMIN — Medication 2 MILLIGRAM(S): at 20:26

## 2021-03-01 RX ADMIN — FLUPHENAZINE HYDROCHLORIDE 15 MILLIGRAM(S): 1 TABLET, FILM COATED ORAL at 20:26

## 2021-03-01 RX ADMIN — LITHIUM CARBONATE 1350 MILLIGRAM(S): 300 TABLET, EXTENDED RELEASE ORAL at 20:26

## 2021-03-01 RX ADMIN — Medication 5 MILLIGRAM(S): at 20:26

## 2021-03-01 RX ADMIN — Medication 50 MILLIGRAM(S): at 20:27

## 2021-03-01 NOTE — BH PSYCHOLOGY - GROUP THERAPY NOTE - NSPSYCHOLGRPCOGPROB_PSY_A_CORE FT
Anxiety, Depression, Emotion dysregulation, Lack of coping skills
Anxiety, Depression, Emotion dysregulation, Lack of coping skills

## 2021-03-01 NOTE — BH INPATIENT PSYCHIATRY PROGRESS NOTE - NSBHFUPINTERVALHXFT_PSY_A_CORE
Pt compliant with medication and tolerating it well.  Per report, patient was on the phone and told a family member SI, they called the unit, expressed concern, CHRISTINA assessed patient and patient unable to contract for safety and placed on CO 1:1.  Today, patient denies SI/I/P or depressed mood.  Pt reports he expressed SI out of frustration of the unknown, but did not mean it and reports he does not want to kill himself.  Pt reports he continues to want to go to rehab or to a place of his own.  Patient reported he told his payee he would leave rehab as "a threat" because he wanted his checks, but reports he did not mean it and is going to forget about the money and go to rehab.  Pt denies HI/I/P.

## 2021-03-01 NOTE — BH INPATIENT PSYCHIATRY PROGRESS NOTE - NSBHASSESSSUMMFT_PSY_ALL_CORE
>Obs: CO 1:1 for recent suicidal statement yesterday evening  >Psychiatric Meds: Taper down Prolixin to 15 PO at bedtime as patient received RENO, Shaniko Eskalith CR added back and titrated to 1350mg PO at bedtime, with Abilify titrated to 15mg PO at bedtime and add Melatonin 5mg PO at bedtime.  Case discussed with unit chief, Dr. Zapata and patient may benefit from Prolixin Decanoate 37.5mg IM every 3-4 weeks + Aristada 1064mg IM every 6-8 weeks given on 2/23/21.  Case also discussed with pharmacist, Dr. Beckwith, who researched medications and interactions and both can be given on same day in different sites  >Social: milieu therapy  >Treatment: Group therapy, coping skills development for emotion management and mood regulation. Motivational counseling for substance abuse related issues.   >Continue to provide therapeutic interventions in support of treatment goals.  >Dispo: awaiting inpatient rehab acceptance

## 2021-03-01 NOTE — BH PSYCHOLOGY - GROUP THERAPY NOTE - NSPSYCHOLGRPCOGINT_PSY_A_CORE FT
Cognitive/behavioral therapy, Emotion regulation/coping skills taught, Psychoed
Cognitive/behavioral therapy, Emotion regulation/coping skills taught, Psychoeducation

## 2021-03-01 NOTE — BH PSYCHOLOGY - GROUP THERAPY NOTE - NSPSYCHOLGRPCOGPT_PSY_A_CORE FT
Patient attended Cognitive Behavioral Therapy Group. The group started with a brief check in and mindfulness /relaxation exercise. The group then focused on the topic of distress tolerance and self care. Patients discussed the concept of distress tolerance and shared examples of healthy ways to cope with distress and ways to engage in healthy self-care (balanced sleep, healthy eating etc). The  explained concepts, reinforced participation, and engaged patients in the discussion.  The group concluded with a checkout and discussion of daily goals.  
Patient attended Cognitive Behavioral Therapy Group. The group started with a brief check-in during which Pts were asked to share a place they would like to travel to if they could go anywhere. The group then focused on the topic of experiencing opposite feelings/behaviors, as well as how to understand and balance these opposites. Additionally, the group discussed ways to engage in self-care, the importance of this as well as the connection between caring for the mind and body.  explained concepts, reinforced participation, and engaged patients in the discussion.

## 2021-03-01 NOTE — BH PSYCHOLOGY - GROUP THERAPY NOTE - NSPSYCHOLGRPCOGINT_PSY_A_CORE
group members provided support/group members suggested positive behaviors/mindfulness skills taught/relaxation skills practiced/other..
other..

## 2021-03-01 NOTE — BH PSYCHOLOGY - GROUP THERAPY NOTE - NSPSYCHOLGRPCOGGOAL_PSY_A_CORE FT
Decrease symptoms, Develop coping/emotion regulation skills, Psychoeducation  
Decrease symptoms, Develop coping/emotion regulation skills, Psychoeducation

## 2021-03-01 NOTE — BH PSYCHOLOGY - GROUP THERAPY NOTE - NSBHPSYCHOLRESPCOMMENT_PSY_A_CORE FT
Pt appeared adequately groomed and casually dressed. Pt appeared relatively engaged in group discussion as he was attending to what other members were sharing and participated independently. Pt joined group after check-in question. Pt shared that he feels frustrated with himself at times due to being hospitalized and needing to take medications. Pt was receptive to the notion that he can feel both mad at himself and love and respect himself. Pt expressed that being aware how substances impact him is important, adding that too much caffeine makes him feel “shaky and jittery.” Pt was willing to read from the worksheet. Speech was pressured, and tangential. PT was oriented X3. PT talked over peers at times and needed prompting to raise his had to speak, but was receptive to redirection and limit setting.

## 2021-03-01 NOTE — BH PSYCHOLOGY - GROUP THERAPY NOTE - NSBHPSYCHOLPARTICIPCOMMENT_PSY_A_CORE FT
Pt was tangential with pressured speech. Required frequent redirection
Pt participated independently

## 2021-03-02 PROCEDURE — 99232 SBSQ HOSP IP/OBS MODERATE 35: CPT | Mod: 25

## 2021-03-02 RX ADMIN — FLUPHENAZINE HYDROCHLORIDE 15 MILLIGRAM(S): 1 TABLET, FILM COATED ORAL at 20:29

## 2021-03-02 RX ADMIN — ARIPIPRAZOLE 15 MILLIGRAM(S): 15 TABLET ORAL at 20:30

## 2021-03-02 RX ADMIN — Medication 50 MILLIGRAM(S): at 20:29

## 2021-03-02 RX ADMIN — Medication 5 MILLIGRAM(S): at 20:29

## 2021-03-02 RX ADMIN — Medication 1 PATCH: at 08:37

## 2021-03-02 RX ADMIN — Medication 2 MILLIGRAM(S): at 20:29

## 2021-03-02 RX ADMIN — LITHIUM CARBONATE 1350 MILLIGRAM(S): 300 TABLET, EXTENDED RELEASE ORAL at 20:30

## 2021-03-02 NOTE — BH INPATIENT PSYCHIATRY DISCHARGE NOTE - NSDCCPCAREPLAN_GEN_ALL_CORE_FT
PRINCIPAL DISCHARGE DIAGNOSIS  Diagnosis: Schizoaffective disorder, manic type  Assessment and Plan of Treatment:
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
## 2021-03-02 NOTE — BH INPATIENT PSYCHIATRY PROGRESS NOTE - NSBHASSESSSUMMFT_PSY_ALL_CORE
>Obs: routine checks for safety  >Psychiatric Meds: Taper down Prolixin to 15 PO at bedtime as patient received RENO, [place] Eskalith CR added back and titrated to 1350mg PO at bedtime, with Abilify titrated to 15mg PO at bedtime and add Melatonin 5mg PO at bedtime.  Case discussed with unit chief, Dr. Zapata and patient may benefit from Prolixin Decanoate 37.5mg IM every 3-4 weeks + Aristada 1064mg IM every 6-8 weeks given on 2/23/21.  Case also discussed with pharmacist, Dr. Beckwith, who researched medications and interactions and both can be given on same day in different sites  >Social: milieu therapy  >Treatment: Group therapy, coping skills development for emotion management and mood regulation. Motivational counseling for substance abuse related issues.   >Continue to provide therapeutic interventions in support of treatment goals.  >Dispo: accepted to inpatient rehab with discharge date planned for 3/5/21

## 2021-03-02 NOTE — BH INPATIENT PSYCHIATRY DISCHARGE NOTE - NSDCMRMEDTOKEN_GEN_ALL_CORE_FT
ARIPiprazole 15 mg oral tablet: 1 tab(s) orally once a day (at bedtime)--take 5 days then discontinue as he is on Aristada RENO given 2/23/21  Aristada 1064 mg/3.9 mL intramuscular suspension, extended release: 1064 milligram(s) intramuscular every 6 weeks (can be given every 6-8 weeks--last given on 2/23/21)  benztropine 2 mg oral tablet: 1 tab(s) orally once a day (at bedtime)  fluPHENAZine 5 mg oral tablet: 3 tab(s) orally once a day (at bedtime)--Prolixin PO can be discontinued after patient receives next dose of Prolixin RENO  lithium 450 mg oral tablet, extended release: 3 tab(s) orally once a day (at bedtime)  melatonin 5 mg oral tablet: 1 tab(s) orally once a day (at bedtime)  nicotine 21 mg/24 hr transdermal film, extended release: 21 milligram(s) transdermal once a day  Prolixin Decanoate: 37.5 milligram(s) injectable every 3 weeks (can be given every 3-4 weeks---last given on 2/23/21)   benztropine 2 mg oral tablet: 1 tab(s) orally once a day (at bedtime)  lithium 450 mg oral tablet, extended release: 3 tab(s) orally once a day (at bedtime)  melatonin 5 mg oral tablet: 1 tab(s) orally once a day (at bedtime)  nicotine 21 mg/24 hr transdermal film, extended release: 21 milligram(s) transdermal once a day

## 2021-03-02 NOTE — BH INPATIENT PSYCHIATRY DISCHARGE NOTE - NSBHDCHANDOFF_PSY_ALL_CORE
Is This A New Presentation, Or A Follow-Up?: Skin Lesion What Type Of Note Output Would You Prefer (Optional)?: Standard Output How Severe Is Your Skin Lesion?: mild Has Your Skin Lesion Been Treated?: not been treated Yes...

## 2021-03-02 NOTE — BH INPATIENT PSYCHIATRY DISCHARGE NOTE - NSBHMETABOLIC_PSY_ALL_CORE_FT
BMI: BMI (kg/m2): 28.1 (01-20-21 @ 21:08)  HbA1c: A1C with Estimated Average Glucose Result: 6.0 % (02-09-21 @ 10:10)    Glucose:   BP: 118/84 (03-01-21 @ 07:57) (118/84 - 118/84)  Lipid Panel: Date/Time: 02-09-21 @ 10:10  Cholesterol, Serum: 264  Direct LDL: --  HDL Cholesterol, Serum: 84  Total Cholesterol/HDL Ration Measurement: --  Triglycerides, Serum: 130

## 2021-03-02 NOTE — BH INPATIENT PSYCHIATRY DISCHARGE NOTE - NSBHDCMEDICALFT_PSY_A_CORE
N/A Complete MED list below:  All Active Medications at time of Discharge Reconciliation: 05-Mar-2021 09:45  Abilify 20 mg oral tablet 1 tab(s) orally once a day (at bedtime)--STOP after 12 days as pt on ABILIFY Aristada RENO given 2/23/21   Aristada 1064 mg/3.9 mL intramuscular suspension, extended release 1064 milligram(s) intramuscular every 6 weeks (possible every 6-8 weeks--last given 2/23/21); NEXT DUE: 4/6/21   benztropine 2 mg oral tablet 1 tab(s) orally once a day (at bedtime)   fluPHENAZine 5 mg oral tablet 3 tab(s) orally once a day (at bedtime)-- STOP after 12 days when patient receives next dose of Prolixin RENO due 3/16   fluPHENAZine decanoate 25 mg/mL injectable solution 37.5 milligram(s) injectable every 3 weeks (can be given every 3-4 weeks---last given on 2/23/21); NEXT DUE: 3/16/21    lithium 450 mg oral tablet, extended release 3 tab(s) orally once a day (at bedtime)   melatonin 5 mg oral tablet 1 tab(s) orally once a day (at bedtime)   nicotine 21 mg/24 hr transdermal film, extended release 21 milligram(s) transdermal once a day

## 2021-03-02 NOTE — BH INPATIENT PSYCHIATRY PROGRESS NOTE - NSBHFUPINTERVALHXFT_PSY_A_CORE
Pt compliant with medication and tolerating it well.  No events reported overnight.  Pt told treatment team would meet with him at 11:30am, patient agreeable, then time had to be delayed due to unit meetings, which was told to patient and he was polite and agreeable to meet later on.  Pt was asked about why he wants to go to inpatient rehab after discharge.  Pt reports he needs to "do it for myself," that he knows the substances he has been using have negatively affected his life.  Pt endorses motivation to stay sober and partake in inpatient rehab.  Pt reports he eventually would like to get a job working for a [employer] business and eventually get a place of his own and get into a relationship which he reports, "is all I ever wanted."  Pt endorses understanding that if he does not complete rehab, he will end up in state hospital or in [facility] if he continues the behaviors he was doing prior to admission.  Pt identifies his mother as a trigger for him and that she is "controlling" and yells at him on the phone, which sets him off and causes him to say stupid things.  Pt asking for the treatment team to speak to his mother about what he wants and his goals on his behalf as he would like to take a break from talking to her to focus on himself and not get triggered by her.  Pt reports he feels safe on the unit and is not expressing any SI/HI/I/P.  Pt reports when other peers or staff on the unit trigger him, he will walk away or talk to staff he trusts.  Pt agreeable to keep in contact with his ACT team, and agrees for them to get him a phone if they can, to keep contact.  Pt reports he is frustrated that he cannot control his own money and has to have a payee, but understands he will get some of his money after he completes rehab.  Pt was advised he was accepted to go to inpatient rehab for the end of this week and was happy about it.  Pt also asked for a notebook to write down the things he should be doing to continue to do well now and in the future.  Dr. Zapata, JULIO and writer in agreement with plan for discharge to inpatient rehab this week. Pt compliant with medication and tolerating it well.  No events reported overnight.  Pt not intrusive today and more calm and cooperative.  Treatment team told patient we would meet with him at 11:30am, patient agreeable, then time had to be delayed due to unit meetings, which was told to patient and he was polite and agreeable to meet later on.  Pt was asked about why he wants to go to inpatient rehab after discharge.  Pt reports he needs to "do it for myself," that he knows the substances he has been using have negatively affected his life.  Pt endorses motivation to stay sober and partake in inpatient rehab.  Pt reports he eventually would like to get a job working for a [employer] business and eventually get a place of his own and get into a relationship which he reports, "is all I ever wanted."  Pt endorses understanding that if he does not complete rehab, he will end up in state hospital or in [facility] if he continues the behaviors he was doing prior to admission.  Pt identifies his mother as a trigger for him and that she is "controlling" and yells at him on the phone, which sets him off and causes him to say stupid things.  Pt asking for the treatment team to speak to his mother about what he wants and his goals on his behalf as he would like to take a break from talking to her to focus on himself and not get triggered by her.  Pt reports he feels safe on the unit and is not expressing any SI/HI/I/P.  Pt reports when other peers or staff on the unit trigger him, he will walk away or talk to staff he trusts.  Pt agreeable to keep in contact with his ACT team, and agrees for them to get him a phone if they can, to keep contact.  Pt reports he feels that his ACT team will have him put in the hospital even if he leaves wherever he is staying after rehab for a few hours to go get food.  Pt agreeable to write down on an index card the dates and times of when his ACT team will meet with him to be better available for them to help him.  Pt reports he is frustrated that he cannot control his own money and has to have a payee, but understands he will get some of his money after he completes rehab.  Pt was advised he was accepted to go to inpatient rehab for the end of this week and was happy about it.  Pt also asked for a notebook to write down the things he should be doing to continue to do well now and in the future.  JULIO Kebede and writer in agreement with plan for discharge to inpatient rehab this week.
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
## 2021-03-02 NOTE — BH INPATIENT PSYCHIATRY DISCHARGE NOTE - NSBHDCRISKMITIGATE_PSY_ALL_CORE
Safety planning/Referral to ACT Team/Long acting injectable medication/Medications targeting suicidality/non-suicidal self injurious behavior/Assisted outpatient treatment/Other

## 2021-03-02 NOTE — BH SAFETY PLAN - INTERNAL COPING STRATEGY 2
Hold something in my hand I can engage my senses by holding something in my hand or listening to music.

## 2021-03-02 NOTE — BH SAFETY PLAN - STEP 6 SAFE ENVIRONMENT
Call the  I can keep dangerous items out of my immediate environment and keep emergency contact information accessible.

## 2021-03-02 NOTE — BH INPATIENT PSYCHIATRY DISCHARGE NOTE - NSBHDCHANDOFFFT_PSY_ALL_CORE
Handoff provided to ACT and AOT workers in discharge meeting.  SW to fax discharge summary.  Can call Haley Alonzo NP or Dr. Benny Ander at [phone] for questions

## 2021-03-03 LAB — SARS-COV-2 RNA SPEC QL NAA+PROBE: SIGNIFICANT CHANGE UP

## 2021-03-03 PROCEDURE — 99232 SBSQ HOSP IP/OBS MODERATE 35: CPT

## 2021-03-03 RX ORDER — ARIPIPRAZOLE 15 MG/1
1 TABLET ORAL
Qty: 0 | Refills: 0 | DISCHARGE
Start: 2021-03-03

## 2021-03-03 RX ORDER — FLUPHENAZINE HYDROCHLORIDE 1 MG/1
3 TABLET, FILM COATED ORAL
Qty: 0 | Refills: 0 | DISCHARGE
Start: 2021-03-03

## 2021-03-03 RX ORDER — NICOTINE POLACRILEX 2 MG
21 GUM BUCCAL
Qty: 0 | Refills: 0 | DISCHARGE
Start: 2021-03-03

## 2021-03-03 RX ORDER — LITHIUM CARBONATE 300 MG/1
3 TABLET, EXTENDED RELEASE ORAL
Qty: 0 | Refills: 0 | DISCHARGE
Start: 2021-03-03

## 2021-03-03 RX ORDER — BENZTROPINE MESYLATE 1 MG
1 TABLET ORAL
Qty: 0 | Refills: 0 | DISCHARGE
Start: 2021-03-03

## 2021-03-03 RX ADMIN — FLUPHENAZINE HYDROCHLORIDE 15 MILLIGRAM(S): 1 TABLET, FILM COATED ORAL at 20:43

## 2021-03-03 RX ADMIN — LITHIUM CARBONATE 1350 MILLIGRAM(S): 300 TABLET, EXTENDED RELEASE ORAL at 20:43

## 2021-03-03 RX ADMIN — Medication 1 PATCH: at 08:41

## 2021-03-03 RX ADMIN — Medication 50 MILLIGRAM(S): at 20:43

## 2021-03-03 RX ADMIN — Medication 5 MILLIGRAM(S): at 20:43

## 2021-03-03 RX ADMIN — ARIPIPRAZOLE 15 MILLIGRAM(S): 15 TABLET ORAL at 20:43

## 2021-03-03 RX ADMIN — Medication 2 MILLIGRAM(S): at 20:43

## 2021-03-03 NOTE — BH INPATIENT PSYCHIATRY PROGRESS NOTE - NSBHASSESSSUMMFT_PSY_ALL_CORE
>Obs: routine checks for safety  >Psychiatric Meds: Taper down Prolixin to 15 PO at bedtime as patient received RENO, [place] Eskalith CR added back and titrated to 1350mg PO at bedtime, with Abilify titrated to 15mg PO at bedtime and add Melatonin 5mg PO at bedtime.  Case discussed with unit chief, Dr. Zapata and patient may benefit from Prolixin Decanoate 37.5mg IM every 3-4 weeks + Aristada 1064mg IM every 6-8 weeks given on 2/23/21.  Case also discussed with pharmacist, Dr. Beckwith, who researched medications and interactions and both can be given on same day in different sites  >Social: milieu therapy  >Treatment: Group therapy, coping skills development for emotion management and mood regulation. Motivational counseling for substance abuse related issues.   >Continue to provide therapeutic interventions in support of treatment goals.  >Dispo: accepted to inpatient rehab with discharge date planned for 3/5/21

## 2021-03-03 NOTE — BH INPATIENT PSYCHIATRY PROGRESS NOTE - NSBHFUPINTERVALHXFT_PSY_A_CORE
Pt compliant with medication and tolerating it well.  No events reported overnight.  Pt continues to be happy and motivated to go to inpatient rehab at the end of the week.  Pt brings up his goals of completing rehab, staying sober and eventually starting a family.  Pt acknowledges that if he takes the appropriate actions, he and keeps in contact with his ACT team, eventually he can be off of ACT and AOT.  Pt calm and cooperative on the unit.  Pt denies SI/HI/I/P.  Pt reports sleeping well.

## 2021-03-04 PROCEDURE — 99232 SBSQ HOSP IP/OBS MODERATE 35: CPT

## 2021-03-04 RX ORDER — ARIPIPRAZOLE 15 MG/1
20 TABLET ORAL AT BEDTIME
Refills: 0 | Status: DISCONTINUED | OUTPATIENT
Start: 2021-03-04 | End: 2021-03-05

## 2021-03-04 RX ADMIN — Medication 2 MILLIGRAM(S): at 21:04

## 2021-03-04 RX ADMIN — Medication 5 MILLIGRAM(S): at 21:03

## 2021-03-04 RX ADMIN — Medication 50 MILLIGRAM(S): at 21:03

## 2021-03-04 RX ADMIN — FLUPHENAZINE HYDROCHLORIDE 15 MILLIGRAM(S): 1 TABLET, FILM COATED ORAL at 21:03

## 2021-03-04 RX ADMIN — LITHIUM CARBONATE 1350 MILLIGRAM(S): 300 TABLET, EXTENDED RELEASE ORAL at 21:03

## 2021-03-04 RX ADMIN — ARIPIPRAZOLE 20 MILLIGRAM(S): 15 TABLET ORAL at 21:03

## 2021-03-04 NOTE — BH DISCHARGE NOTE NURSING/SOCIAL WORK/PSYCH REHAB - NSDCPRRECOMMEND_PSY_ALL_CORE
Writer recommends that patient demonstrate medication/treatment compliance for improved symptom management, abstain from substance abuse for healthier living, engage with/increase social support system for social skills and feelings of belonging, and improve his community involvement via gainful employment or volunteering. Upon discharge, patient would benefit from outpatient treatment for ongoing medication management, psychotherapy, and support in addition to substance abuse treatment.

## 2021-03-04 NOTE — BH INPATIENT PSYCHIATRY PROGRESS NOTE - NSBHFUPINTERVALHXFT_PSY_A_CORE
Pt compliant with medication and tolerating it well.  No events reported overnight.  Pt continues to be happy and motivated to go to inpatient rehab at the end of the week with goal of FRI DC. Pt known to  writeradriel MD in AM sitting comfortably in bed, but quickly reaches comfort level on any subject that causes anxiety, such as admits, substances or impaired functioning.  Pt brings up his goals of completing rehab, staying sober and eventually starting a family.  Pt acknowledges that if he takes the appropriate actions, and keeps in contact with his ACT team, eventually he can be off of ACT and AOT.  Pt calm and cooperative on the unit with dual RENO medication strategy.  Pt denies SIIP or HIIP and adequately behaviorally controlled.  Pt reports sleeping well.

## 2021-03-04 NOTE — BH INPATIENT PSYCHIATRY PROGRESS NOTE - OTHER
pt narrative Does have element of pseudocompliance stating what he thinks interviewer would like to hear, ongoing. avoidant themes

## 2021-03-04 NOTE — BH DISCHARGE NOTE NURSING/SOCIAL WORK/PSYCH REHAB - NSCDUDCCRISIS_PSY_A_CORE
[facility]  1 [phone]) [facility]-WELL ([phone])  Text "WELL" to 40347  Website: www.[employer]/.[employer] 1 (528) 161-STJW (9083) Website: www.safehorizon.org/.National Suicide Prevention Lifeline 9 (823) 057-4785/.  [employer]  1 (554) LIFENET (398-3552)/.  [facility]  [address] [place] [address] [phone]   Hours:  Monday through Friday from 9 AM to 3 PM/.  [employer] - [facility] Crisis Line  [phone], Option 1

## 2021-03-04 NOTE — BH DISCHARGE NOTE NURSING/SOCIAL WORK/PSYCH REHAB - NSDCPRGOAL_PSY_ALL_CORE
Patient was able to acknowledge triggers sensitive conversations with his mother, feeling that his autonomy is being intruded upon, or that he is not being taken seriously. Patient verbalized understanding that impulse control is a symptom that he must truly work towards improving both in relation to communication with others, his own emotional well-being, and substance abuse. Patient contracted for safety and completed patient safety plan with writer’s assistance; patient also accepted patient experience survey to complete it on his own. Patient endorsed intent to complete substance abuse treatment. Patient denied suicidal ideation, homicidal ideation, or psychotic symptoms during final session with writer and patient did not make any disorganized/delusional statements therein. Patient has latterly been more engaged and receptive during psychoeducation rounding and other interactions with writer. Patient’s hyperverbal speech and overt signs of disorganization has decreased and he has been able to demonstrate greater control of his behavior. Patient has admitted to writer that he worries about what others think of him and that this makes socialization difficult but writer has observed patients taking more risks in this area, latterly.

## 2021-03-04 NOTE — BH INPATIENT PSYCHIATRY PROGRESS NOTE - NSBHASSESSSUMMFT_PSY_ALL_CORE
>Obs: routine checks for safety  >Psychiatric Meds: Taper down Prolixin to 15 PO at bedtime as patient received RENO, [place] Eskalith CR added back and titrated to 1350mg PO at bedtime, with Abilify titrated to 15mg PO at bedtime and add Melatonin 5mg PO at bedtime.  Case discussed with unit chief, Dr. Zapata and patient may benefit from Prolixin Decanoate 37.5mg IM every 3-4 weeks + Aristada 1064mg IM every 6-8 weeks given on 2/23/21.  Case also discussed with pharmacist, Dr. Beckwith, who researched medications and interactions and both can be given on same day in different sites  >Social: milieu therapy  >Treatment: Group therapy, coping skills development for emotion management and mood regulation. Motivational counseling for substance abuse related issues.   >Continue to provide therapeutic interventions in support of treatment goals.  >Dispo: accepted to inpatient rehab with discharge date planned for 3/5/21

## 2021-03-05 VITALS — WEIGHT: 153.22 LBS | HEIGHT: 68 IN

## 2021-03-05 PROCEDURE — 99239 HOSP IP/OBS DSCHRG MGMT >30: CPT

## 2021-03-05 RX ORDER — FLUPHENAZINE HYDROCHLORIDE 1 MG/1
3 TABLET, FILM COATED ORAL
Qty: 36 | Refills: 0
Start: 2021-03-05 | End: 2021-03-16

## 2021-03-05 RX ORDER — ARIPIPRAZOLE 15 MG/1
1064 TABLET ORAL
Qty: 0 | Refills: 0 | DISCHARGE

## 2021-03-05 RX ORDER — FLUPHENAZINE HYDROCHLORIDE 1 MG/1
37.5 TABLET, FILM COATED ORAL
Qty: 1 | Refills: 0
Start: 2021-03-05 | End: 2021-03-07

## 2021-03-05 RX ORDER — FLUPHENAZINE HYDROCHLORIDE 1 MG/1
37.5 TABLET, FILM COATED ORAL
Qty: 0 | Refills: 0 | DISCHARGE

## 2021-03-05 RX ORDER — ARIPIPRAZOLE 15 MG/1
1064 TABLET ORAL
Qty: 1 | Refills: 0
Start: 2021-03-05

## 2021-03-05 RX ORDER — ARIPIPRAZOLE 15 MG/1
1 TABLET ORAL
Qty: 12 | Refills: 0
Start: 2021-03-05 | End: 2021-03-16

## 2021-03-05 NOTE — BH INPATIENT PSYCHIATRY PROGRESS NOTE - NSTXPROBDCHOUS_PSY_ALL_CORE
DISCHARGE ISSUE - LACK OF APPROPRIATE HOUSING

## 2021-03-05 NOTE — BH INPATIENT PSYCHIATRY PROGRESS NOTE - NSBHMSEJUDGE_PSY_A_CORE
Fair
Poor
Fair
Poor
Fair
Fair
Poor
Fair
Fair
Unable to assess
Fair
Poor
Fair
Poor
Fair
Fair
Poor
Fair
Poor
Fair

## 2021-03-05 NOTE — BH INPATIENT PSYCHIATRY PROGRESS NOTE - NSICDXBHPRIMARYDX_PSY_ALL_CORE
Schizophrenia   F20.9  

## 2021-03-05 NOTE — BH INPATIENT PSYCHIATRY PROGRESS NOTE - NSTXDISORGDATETRGT_PSY_ALL_CORE
05-Feb-2021
05-Feb-2021
10-Mar-2021
05-Feb-2021
29-Jan-2021
05-Feb-2021
29-Jan-2021
05-Feb-2021
29-Jan-2021
05-Feb-2021
29-Jan-2021
05-Feb-2021

## 2021-03-05 NOTE — BH INPATIENT PSYCHIATRY PROGRESS NOTE - NSTXPROBCOPE_PSY_ALL_CORE
COPING, INEFFECTIVE

## 2021-03-05 NOTE — BH INPATIENT PSYCHIATRY PROGRESS NOTE - NSBHINPTBILLING_PSY_ALL_CORE
66124 - Inpatient Moderate Complexity
21064 - Inpatient Moderate Complexity
27740 - Inpatient Moderate Complexity
84061 - Inpatient Moderate Complexity
92109 - Inpatient Moderate Complexity
15800 - Inpatient Moderate Complexity
83239 - Inpatient Moderate Complexity
09001 - Inpatient Moderate Complexity
39426 - Inpatient Moderate Complexity
15050 - Inpatient Moderate Complexity
16650 - Inpatient Moderate Complexity
51680 - Inpatient Moderate Complexity
77578 - Inpatient Moderate Complexity
47276 - Inpatient Moderate Complexity
60657 - Inpatient Moderate Complexity
49261 - Inpatient Moderate Complexity
16167 - Inpatient Moderate Complexity
45933 - Inpatient Moderate Complexity
22550 - Inpatient Moderate Complexity
62633 - Inpatient Moderate Complexity
77088 - Inpatient Moderate Complexity
48022 - Inpatient Moderate Complexity
15466 - Inpatient Moderate Complexity
95560 - Inpatient Moderate Complexity
50814 - Inpatient Moderate Complexity
57689 - Hospital Discharge Day Management; more than 30 min
92555 - Inpatient Moderate Complexity
41228 - Inpatient Moderate Complexity
51473 - Inpatient Moderate Complexity
94191 - Inpatient Moderate Complexity
82222 - Inpatient Moderate Complexity

## 2021-03-05 NOTE — BH INPATIENT PSYCHIATRY PROGRESS NOTE - NSBHMSERELATED_PSY_A_CORE
Fair
Good
Poor
Fair
Good
Unable to assess
Poor
Fair
Good
Poor
Fair
Fair
Unable to assess
Fair
Poor
Fair

## 2021-03-05 NOTE — BH INPATIENT PSYCHIATRY PROGRESS NOTE - NSBHASSESSSUMMFT_PSY_ALL_CORE
>Obs: routine checks for safety  >Psychiatric Meds: Taper down Prolixin to 15 PO at bedtime as patient received RENO, Entiat Eskalith CR added back and titrated to 1350mg PO at bedtime, with Abilify titrated to 15mg PO at bedtime and add Melatonin 5mg PO at bedtime.  Case discussed with unit chief, Dr. Zapata and patient may benefit from Prolixin Decanoate 37.5mg IM every 3-4 weeks + Aristada 1064mg IM every 6-8 weeks given on 2/23/21.  Case also discussed with pharmacist, Dr. Bekcwith, who researched medications and interactions and both can be given on same day in different sites  >Social: milieu therapy  >Treatment: Group therapy, coping skills development for emotion management and mood regulation. Motivational counseling for substance abuse related issues.   >Continue to provide therapeutic interventions in support of treatment goals.  >Dispo: accepted to inpatient rehab with discharge date planned for 3/5/21

## 2021-03-05 NOTE — BH INPATIENT PSYCHIATRY PROGRESS NOTE - NSTXCOPEDATETRGT_PSY_ALL_CORE
05-Mar-2021
26-Feb-2021
19-Feb-2021
19-Feb-2021
05-Mar-2021
26-Feb-2021
26-Feb-2021
05-Mar-2021
19-Feb-2021
26-Feb-2021
05-Mar-2021
19-Feb-2021
26-Feb-2021
05-Mar-2021
05-Mar-2021

## 2021-03-05 NOTE — BH INPATIENT PSYCHIATRY PROGRESS NOTE - NSTXDCOPNODATEEST_PSY_ALL_CORE
29-Jan-2021
26-Feb-2021
29-Jan-2021
26-Feb-2021
19-Feb-2021
11-Feb-2021
11-Feb-2021
22-Jan-2021
29-Jan-2021
19-Feb-2021
19-Feb-2021
22-Jan-2021
29-Jan-2021
26-Feb-2021
11-Feb-2021
29-Jan-2021
26-Feb-2021
29-Jan-2021
22-Jan-2021
22-Jan-2021
29-Jan-2021
29-Jan-2021
05-Mar-2021
11-Feb-2021
11-Feb-2021
19-Feb-2021

## 2021-03-05 NOTE — BH INPATIENT PSYCHIATRY PROGRESS NOTE - NSTXDCHOUSINTERMD_PSY_ALL_CORE
wants inpatient rehab

## 2021-03-05 NOTE — BH INPATIENT PSYCHIATRY PROGRESS NOTE - NSTXPSYCHODATETRGT_PSY_ALL_CORE
17-Feb-2021
01-Feb-2021
17-Feb-2021
17-Feb-2021
01-Feb-2021
17-Feb-2021
01-Feb-2021
17-Feb-2021
01-Feb-2021
17-Feb-2021
01-Feb-2021
17-Feb-2021
01-Feb-2021
10-Mar-2021
17-Feb-2021
01-Feb-2021
17-Feb-2021

## 2021-03-05 NOTE — BH INPATIENT PSYCHIATRY PROGRESS NOTE - NSTXDCHOUSPROGRES_PSY_ALL_CORE
Met - goal discontinued
Met - goal discontinued
Improving
Met - goal discontinued
Improving

## 2021-03-05 NOTE — BH INPATIENT PSYCHIATRY PROGRESS NOTE - NSTXPROBSUICID_PSY_ALL_CORE
SUICIDE/SELF-INJURIOUS BEHAVIOR

## 2021-03-05 NOTE — BH INPATIENT PSYCHIATRY PROGRESS NOTE - NSTXIMPULSINTERMD_PSY_ALL_CORE
Prolixin and Abilify with Lithobid
Prolixin and Abilify with Lithobid
Prolixin with Lithobid
Prolixin with Lithobid
Prolixin and Abilify with Lithobid
Prolixin and Abilify with Lithobid
Prolixin with Lithobid
Prolixin Decanoate and Aristada RENO with Lithium Eskalith CR titration
Prolixin with Lithobid
Invega started with Seroquel, added Depakote ER
Prolixin with Lithobid
Prolixin with Lithobid
Prolixin Decanoate and Aristada RENO with Lithium Eskalith CR titration
Prolixin with Lithobid
Prolixin with Lithobid
Invega started with Seroquel, added Depakote ER
Invega started with Seroquel, added Depakote ER
Prolixin with Lithobid
Prolixin and Abilify with Lithobid
Prolixin Decanoate and Aristada RENO with Lithium Eskalith CR titration

## 2021-03-05 NOTE — BH INPATIENT PSYCHIATRY PROGRESS NOTE - NSBHFUPINTERVALHXFT_PSY_A_CORE
Pt compliant with medication and tolerating it well.  No events reported overnight.  Pt continues to be happy and motivated to go to inpatient rehab at the end of the week with goal of DC today. Pt brings up his goals of completing rehab, staying sober and eventually starting a family.  Pt acknowledges that if he takes the appropriate actions, and keeps in contact with his ACT team, eventually he can be off of ACT and AOT.  Pt calm and cooperative on the unit with dual RENO medication strategy.  Pt denies SIIP or HIIP and adequately behaviorally controlled.  Pt reports sleeping well. Ready for rehab. Thanks MD for his help. Says he will call to update MD how things go in rehab. Explained RENO schedule.

## 2021-03-05 NOTE — BH INPATIENT PSYCHIATRY PROGRESS NOTE - NSTXPROBIMPULS_PSY_ALL_CORE
IMPULSIVITY/AGITATION

## 2021-03-05 NOTE — BH INPATIENT PSYCHIATRY PROGRESS NOTE - NSTXDCOPNOPROGRES_PSY_ALL_CORE
Met - goal discontinued
Met - goal discontinued
No Change
Improving
No Change
Met - goal discontinued
No Change
Met - goal discontinued
Met - goal discontinued
Improving
Improving
No Change
Improving
Met - goal discontinued
Improving
No Change
No Change
Improving
Improving
No Change
No Change
Met - goal discontinued
No Change

## 2021-03-05 NOTE — BH INPATIENT PSYCHIATRY PROGRESS NOTE - NSBHCONSBHPROVDETAILS_PSY_A_CORE  FT
Spoke with Dr. Matthew [phone] [phone] [phone] [facility] [phone] [facility] [phone] [facility] Dr[facility] Matthew [phone] [facility] [phone] [facility]
Spoke with Dr. Matthew [phone] [facility] Dr[facility] [phone] [facility] [phone] [facility] Dr. Matthew [phone] [facility] with Dr. Matthew [phone] [facility] Dr[facility] Matthew [phone] [facility] [phone] [facility] [phone] [facility] [phone] [facility] [phone] [facility] Dr. Matthew [phone] [facility] Dr. Matthew [phone] [facility]oke with Dr. Matthew [phone] [facility]
Spoke with Dr. Matthew [phone] [facility], most recently on 3/2/21 with full tx team on conference call.
Spoke with Dr. Matthew [phone] [facility]oke with Dr. Matthew [phone] [facility] Dr[facility] Matthew [phone] [facility] [phone] [facility] Dr[facility] Matthew [phone] [facility]
Spoke with Dr. Matthew [phone] [facility], most recently on 3/2/21 with full tx team on conference call.
Spoke with Dr. Matthew [phone] [facility] Dr[facility] [phone] [facility]

## 2021-03-05 NOTE — BH INPATIENT PSYCHIATRY PROGRESS NOTE - MSE OPTIONS
Structured MSE

## 2021-03-05 NOTE — BH INPATIENT PSYCHIATRY PROGRESS NOTE - NSBHFUPINTERVALCCFT_PSY_A_CORE
Pt seen f/u for mood and psychosis
Pt seen f/u for psychosis and mood
Pt seen f/u for mood and psychosis
Psychosis/Disorganization
Pt seen f/u for psychosis and mood
Pt seen f/u for psychosis
Pt seen f/u for psychosis and agitation
Pt seen f/u for mood and psychosis
Pt seen f/u for psychosis and mood
Pt seen f/u for mood and psychosis
Pt seen f/u for psychosis and mood
Pt seen f/u for mood and psychosis
Pt seen f/u for psychosis and mood
Psychosis/Disorganization
Pt seen f/u for mood and psychosis
Pt seen f/u for psychosis with unit chief, Dr. Zapata and SW present
Pt seen f/u for psychosis and mood
Pt seen f/u for psychosis and mood
Pt seen f/u for mood and psychosis 
Pt seen f/u for psychosis and mood
Pt seen f/u for psychosis and mood
Pt seen f/u for mood and psychosis 
Pt seen f/u for mood and psychosis
Pt seen f/u for psychosis and mood
Pt seen f/u for mood and psychosis

## 2021-03-05 NOTE — BH INPATIENT PSYCHIATRY PROGRESS NOTE - NSTXPROBPSYCHO_PSY_ALL_CORE
PSYCHOTIC SYMPTOMS

## 2021-03-05 NOTE — BH INPATIENT PSYCHIATRY PROGRESS NOTE - NSTXMANICINTERMD_PSY_ALL_CORE
Prolixin Decanoate and Aristada RENO with Lithium Eskalith CR titration with behavior plan
Lithium and Prolixin
Lithium and Prolixin and Abilify
Lithium and Prolixin
Prolixin Decanoate and Aristada RENO with Lithium Eskalith CR titration with behavior plan
Lithium and Prolixin
Lithium and Prolixin
Prolixin Decanoate and Aristada RENO with Lithium Eskalith CR titration with behavior plan
Lithium and Prolixin
Invega started with Seroquel, added Depakote ER
Lithium and Prolixin
Prolixin Decanoate and Aristada RENO with Lithium Eskalith CR titration with behavior plan
Lithium and Prolixin
Lithium and Prolixin and Abilify
Invega started with Seroquel, added Depakote ER
Lithium and Prolixin
Invega started with Seroquel, added Depakote ER
Lithium and Prolixin and Abilify
Prolixin Decanoate and Aristada RENO with Lithium Eskalith CR titration with behavior plan

## 2021-03-05 NOTE — BH INPATIENT PSYCHIATRY PROGRESS NOTE - NSBHMSELANG_PSY_A_CORE
No abnormalities noted
Unable to assess
No abnormalities noted
Unable to assess
No abnormalities noted

## 2021-03-05 NOTE — BH INPATIENT PSYCHIATRY PROGRESS NOTE - NSTXDCOPNOGOAL_PSY_ALL_CORE
Will agree to participate in appropriate outpatient care

## 2021-03-05 NOTE — BH INPATIENT PSYCHIATRY PROGRESS NOTE - NSBHMSERECMEM_PSY_A_CORE
Unable to assess
Unable to assess
Normal
Unable to assess
Unable to assess
Normal
Unable to assess
Normal
Unable to assess
Normal
Unable to assess
Normal
Unable to assess
Normal
Unable to assess

## 2021-03-05 NOTE — BH INPATIENT PSYCHIATRY PROGRESS NOTE - NSBHMETABOLIC_PSY_ALL_CORE_FT
BMI: BMI (kg/m2): 28.1 (01-20-21 @ 21:08)  HbA1c:   Glucose:   BP: 110/67 (02-01-21 @ 07:01) (110/67 - 110/67)  Lipid Panel: 
BMI: BMI (kg/m2): 28.1 (01-20-21 @ 21:08)  HbA1c: A1C with Estimated Average Glucose Result: 6.0 % (02-09-21 @ 10:10)    Glucose:   BP: 107/77 (03-04-21 @ 07:34) (107/77 - 107/77)  Lipid Panel: Date/Time: 02-09-21 @ 10:10  Cholesterol, Serum: 264  Direct LDL: --  HDL Cholesterol, Serum: 84  Total Cholesterol/HDL Ration Measurement: --  Triglycerides, Serum: 130  
BMI: BMI (kg/m2): 28.1 (01-20-21 @ 21:08)  HbA1c: A1C with Estimated Average Glucose Result: 6.0 % (02-09-21 @ 10:10)    Glucose:   BP: 108/73 (02-07-21 @ 07:37) (108/73 - 108/73)  Lipid Panel: Date/Time: 02-09-21 @ 10:10  Cholesterol, Serum: 264  Direct LDL: --  HDL Cholesterol, Serum: 84  Total Cholesterol/HDL Ration Measurement: --  Triglycerides, Serum: 130  
BMI: BMI (kg/m2): 23.3 (03-05-21 @ 07:57)  HbA1c: A1C with Estimated Average Glucose Result: 6.0 % (02-09-21 @ 10:10)    Glucose:   BP: 118/72 (03-05-21 @ 06:21) (107/77 - 118/72)  Lipid Panel: Date/Time: 02-09-21 @ 10:10  Cholesterol, Serum: 264  Direct LDL: --  HDL Cholesterol, Serum: 84  Total Cholesterol/HDL Ration Measurement: --  Triglycerides, Serum: 130  
BMI: BMI (kg/m2): 28.1 (01-20-21 @ 21:08)  HbA1c: A1C with Estimated Average Glucose Result: 6.0 % (02-09-21 @ 10:10)    Glucose:   BP: 123/81 (02-23-21 @ 06:35) (123/81 - 123/81)  Lipid Panel: Date/Time: 02-09-21 @ 10:10  Cholesterol, Serum: 264  Direct LDL: --  HDL Cholesterol, Serum: 84  Total Cholesterol/HDL Ration Measurement: --  Triglycerides, Serum: 130  
BMI: BMI (kg/m2): 28.1 (01-20-21 @ 21:08)  HbA1c:   Glucose:   BP: 110/67 (02-01-21 @ 07:01) (110/67 - 131/83)  Lipid Panel: 
BMI: BMI (kg/m2): 28.1 (01-20-21 @ 21:08)  HbA1c:   Glucose:   BP: 114/73 (02-04-21 @ 07:02) (114/73 - 114/73)  Lipid Panel: 
BMI: BMI (kg/m2): 28.1 (01-20-21 @ 21:08)  HbA1c: A1C with Estimated Average Glucose Result: 6.0 % (02-09-21 @ 10:10)    Glucose:   BP: 125/72 (02-19-21 @ 07:56) (125/72 - 125/72)  Lipid Panel: Date/Time: 02-09-21 @ 10:10  Cholesterol, Serum: 264  Direct LDL: --  HDL Cholesterol, Serum: 84  Total Cholesterol/HDL Ration Measurement: --  Triglycerides, Serum: 130  
BMI: BMI (kg/m2): 28.1 (01-20-21 @ 21:08)  HbA1c: A1C with Estimated Average Glucose Result: 6.0 % (02-09-21 @ 10:10)    Glucose:   BP: --  Lipid Panel: Date/Time: 02-09-21 @ 10:10  Cholesterol, Serum: 264  Direct LDL: --  HDL Cholesterol, Serum: 84  Total Cholesterol/HDL Ration Measurement: --  Triglycerides, Serum: 130  
BMI: BMI (kg/m2): 28.1 (01-20-21 @ 21:08)  HbA1c: A1C with Estimated Average Glucose Result: 6.0 % (02-09-21 @ 10:10)    Glucose:   BP: 109/62 (02-15-21 @ 08:07) (109/62 - 109/62)  Lipid Panel: Date/Time: 02-09-21 @ 10:10  Cholesterol, Serum: 264  Direct LDL: --  HDL Cholesterol, Serum: 84  Total Cholesterol/HDL Ration Measurement: --  Triglycerides, Serum: 130  
BMI: BMI (kg/m2): 28.1 (01-20-21 @ 21:08)  HbA1c: A1C with Estimated Average Glucose Result: 6.0 % (02-09-21 @ 10:10)    Glucose:   BP: 115/82 (02-26-21 @ 06:46) (115/82 - 115/82)  Lipid Panel: Date/Time: 02-09-21 @ 10:10  Cholesterol, Serum: 264  Direct LDL: --  HDL Cholesterol, Serum: 84  Total Cholesterol/HDL Ration Measurement: --  Triglycerides, Serum: 130  
BMI: BMI (kg/m2): 28.1 (01-20-21 @ 21:08)  HbA1c:   Glucose:   BP: 126/94 (01-23-21 @ 07:29) (126/94 - 126/94)  Lipid Panel: 
BMI: BMI (kg/m2): 28.1 (01-20-21 @ 21:08)  HbA1c:   Glucose:   BP: 110/67 (02-01-21 @ 07:01) (110/67 - 110/67)  Lipid Panel: 
BMI: BMI (kg/m2): 28.1 (01-20-21 @ 21:08)  HbA1c: A1C with Estimated Average Glucose Result: 6.0 % (02-09-21 @ 10:10)    Glucose:   BP: 123/81 (02-23-21 @ 06:35) (123/81 - 123/81)  Lipid Panel: Date/Time: 02-09-21 @ 10:10  Cholesterol, Serum: 264  Direct LDL: --  HDL Cholesterol, Serum: 84  Total Cholesterol/HDL Ration Measurement: --  Triglycerides, Serum: 130  
BMI: BMI (kg/m2): 28.1 (01-20-21 @ 21:08)  HbA1c:   Glucose:   BP: 108/73 (02-07-21 @ 07:37) (108/73 - 122/74)  Lipid Panel: 
BMI: BMI (kg/m2): 28.1 (01-20-21 @ 21:08)  HbA1c:   Glucose:   BP: 116/80 (01-28-21 @ 06:32) (109/76 - 116/80)  Lipid Panel: 
BMI: BMI (kg/m2): 28.1 (01-20-21 @ 21:08)  HbA1c:   Glucose:   BP: 100/62 (01-26-21 @ 06:30) (100/62 - 128/86)  Lipid Panel: 
BMI: BMI (kg/m2): 28.1 (01-20-21 @ 21:08)  HbA1c:   Glucose:   BP: 114/93 (01-25-21 @ 07:38) (114/93 - 128/86)  Lipid Panel: 
BMI: BMI (kg/m2): 28.1 (01-20-21 @ 21:08)  HbA1c:   Glucose:   BP: 114/73 (02-04-21 @ 07:02) (114/73 - 114/73)  Lipid Panel: 
BMI: BMI (kg/m2): 28.1 (01-20-21 @ 21:08)  HbA1c: A1C with Estimated Average Glucose Result: 6.0 % (02-09-21 @ 10:10)    Glucose:   BP: 116/81 (02-11-21 @ 08:35) (116/81 - 116/81)  Lipid Panel: Date/Time: 02-09-21 @ 10:10  Cholesterol, Serum: 264  Direct LDL: --  HDL Cholesterol, Serum: 84  Total Cholesterol/HDL Ration Measurement: --  Triglycerides, Serum: 130  
BMI: BMI (kg/m2): 28.1 (01-20-21 @ 21:08)  HbA1c: A1C with Estimated Average Glucose Result: 6.0 % (02-09-21 @ 10:10)    Glucose:   BP: 123/81 (02-23-21 @ 06:35) (123/81 - 146/88)  Lipid Panel: Date/Time: 02-09-21 @ 10:10  Cholesterol, Serum: 264  Direct LDL: --  HDL Cholesterol, Serum: 84  Total Cholesterol/HDL Ration Measurement: --  Triglycerides, Serum: 130  
BMI: BMI (kg/m2): 28.1 (01-20-21 @ 21:08)  HbA1c: A1C with Estimated Average Glucose Result: 6.0 % (02-09-21 @ 10:10)    Glucose:   BP: 146/88 (02-21-21 @ 06:49) (116/70 - 146/88)  Lipid Panel: Date/Time: 02-09-21 @ 10:10  Cholesterol, Serum: 264  Direct LDL: --  HDL Cholesterol, Serum: 84  Total Cholesterol/HDL Ration Measurement: --  Triglycerides, Serum: 130  
BMI: BMI (kg/m2): 28.1 (01-20-21 @ 21:08)  HbA1c:   Glucose:   BP: 109/76 (01-27-21 @ 08:13) (100/62 - 114/93)  Lipid Panel: 
BMI: BMI (kg/m2): 28.1 (01-20-21 @ 21:08)  HbA1c: A1C with Estimated Average Glucose Result: 6.0 % (02-09-21 @ 10:10)    Glucose:   BP: 118/84 (03-01-21 @ 07:57) (118/84 - 118/84)  Lipid Panel: Date/Time: 02-09-21 @ 10:10  Cholesterol, Serum: 264  Direct LDL: --  HDL Cholesterol, Serum: 84  Total Cholesterol/HDL Ration Measurement: --  Triglycerides, Serum: 130  
BMI: BMI (kg/m2): 28.1 (01-20-21 @ 21:08)  HbA1c: A1C with Estimated Average Glucose Result: 6.0 % (02-09-21 @ 10:10)    Glucose:   BP: 118/79 (02-12-21 @ 08:30) (116/81 - 118/79)  Lipid Panel: Date/Time: 02-09-21 @ 10:10  Cholesterol, Serum: 264  Direct LDL: --  HDL Cholesterol, Serum: 84  Total Cholesterol/HDL Ration Measurement: --  Triglycerides, Serum: 130  
BMI: BMI (kg/m2): 28.1 (01-20-21 @ 21:08)  HbA1c: A1C with Estimated Average Glucose Result: 6.0 % (02-09-21 @ 10:10)    Glucose:   BP: 109/62 (02-15-21 @ 08:07) (109/62 - 109/62)  Lipid Panel: Date/Time: 02-09-21 @ 10:10  Cholesterol, Serum: 264  Direct LDL: --  HDL Cholesterol, Serum: 84  Total Cholesterol/HDL Ration Measurement: --  Triglycerides, Serum: 130  
BMI: BMI (kg/m2): 28.1 (01-20-21 @ 21:08)  HbA1c:   Glucose:   BP: 128/86 (01-24-21 @ 06:17) (126/94 - 128/86)  Lipid Panel: 
BMI: BMI (kg/m2): 28.1 (01-20-21 @ 21:08)  HbA1c:   Glucose:   BP: 116/80 (01-28-21 @ 06:32) (100/62 - 116/80)  Lipid Panel: 
BMI: BMI (kg/m2): 28.1 (01-20-21 @ 21:08)  HbA1c: A1C with Estimated Average Glucose Result: 6.0 % (02-09-21 @ 10:10)    Glucose:   BP: 118/84 (03-01-21 @ 07:57) (118/84 - 118/84)  Lipid Panel: Date/Time: 02-09-21 @ 10:10  Cholesterol, Serum: 264  Direct LDL: --  HDL Cholesterol, Serum: 84  Total Cholesterol/HDL Ration Measurement: --  Triglycerides, Serum: 130  
BMI: BMI (kg/m2): 28.1 (01-20-21 @ 21:08)  HbA1c: A1C with Estimated Average Glucose Result: 6.0 % (02-09-21 @ 10:10)    Glucose:   BP: --  Lipid Panel: Date/Time: 02-09-21 @ 10:10  Cholesterol, Serum: 264  Direct LDL: --  HDL Cholesterol, Serum: 84  Total Cholesterol/HDL Ration Measurement: --  Triglycerides, Serum: 130  
BMI: BMI (kg/m2): 28.1 (01-20-21 @ 21:08)  HbA1c: A1C with Estimated Average Glucose Result: 6.0 % (02-09-21 @ 10:10)    Glucose:   BP: 118/84 (03-01-21 @ 07:57) (118/84 - 118/84)  Lipid Panel: Date/Time: 02-09-21 @ 10:10  Cholesterol, Serum: 264  Direct LDL: --  HDL Cholesterol, Serum: 84  Total Cholesterol/HDL Ration Measurement: --  Triglycerides, Serum: 130

## 2021-03-05 NOTE — BH INPATIENT PSYCHIATRY PROGRESS NOTE - NSBHMSEMUSCLE_PSY_A_CORE
Normal muscle tone/strength
Normal muscle tone/strength
Unable to assess
Normal muscle tone/strength
Unable to assess
Normal muscle tone/strength
Unable to assess
Normal muscle tone/strength

## 2021-03-05 NOTE — BH INPATIENT PSYCHIATRY PROGRESS NOTE - NSTXDCHOUSDATEEST_PSY_ALL_CORE
26-Feb-2021
19-Feb-2021
26-Feb-2021
19-Feb-2021
19-Feb-2021
05-Mar-2021
26-Feb-2021
26-Feb-2021

## 2021-03-05 NOTE — BH INPATIENT PSYCHIATRY PROGRESS NOTE - ADDITIONAL DETAILS / COMMENTS
improving
notably less expansive
improving
notably less expansive
improving

## 2021-03-05 NOTE — BH INPATIENT PSYCHIATRY PROGRESS NOTE - NSTXCOPEPROGRES_PSY_ALL_CORE
Improving
No Change
Improving
Improving

## 2021-03-05 NOTE — BH INPATIENT PSYCHIATRY PROGRESS NOTE - NSTXPROBMANIC_PSY_ALL_CORE
MANIC SYMPTOMS

## 2021-03-05 NOTE — BH INPATIENT PSYCHIATRY PROGRESS NOTE - NSTXDCHOUSGOALOTHER_PSY_ALL_CORE
Pt will participate in d/c planning to facilitate placement at i/p rehab, as requested.
pt has agreed to attend i/p rehab and will comply / partake in referral process.
Pt will participate in d/c planning to facilitate placement at i/p rehab, as requested.
pt has agreed to attend i/p rehab and will comply / partake in referral process.
Pt will participate in d/c planning to facilitate placement at i/p rehab, as requested.
pt has agreed to attend i/p rehab and will comply / partake in referral process.
pt has agreed to attend i/p rehab and will comply / partake in referral process.
Pt has agreed to i/p rehab and is projected to d/c today, 3/5/21. Housing issues will be address during d/c planning from rehab, pt has active [facility] ans is on the waitlist.

## 2021-03-05 NOTE — BH INPATIENT PSYCHIATRY PROGRESS NOTE - NSBHMSEGAIT_PSY_A_CORE
Normal gait / station
Unable to assess
Normal gait / station
Unable to assess
Normal gait / station

## 2021-03-05 NOTE — BH INPATIENT PSYCHIATRY PROGRESS NOTE - NSTXMANICDATEEST_PSY_ALL_CORE
10-Feb-2021
10-Feb-2021
03-Mar-2021
10-Feb-2021
25-Jan-2021
10-Feb-2021
25-Jan-2021
10-Feb-2021
10-Feb-2021
25-Jan-2021
10-Feb-2021
10-Feb-2021
25-Jan-2021
10-Feb-2021
25-Jan-2021
10-Feb-2021

## 2021-03-05 NOTE — BH INPATIENT PSYCHIATRY PROGRESS NOTE - NSTREATMENTCERTY_PSY_ALL_CORE

## 2021-03-05 NOTE — BH INPATIENT PSYCHIATRY PROGRESS NOTE - PRN MEDS
MEDICATIONS  (PRN):  chlorproMAZINE    Injectable 100 milliGRAM(s) IntraMuscular once PRN severe agitation or aggression  chlorproMAZINE    Tablet 100 milliGRAM(s) Oral every 4 hours PRN severe agitation or aggression  diphenhydrAMINE 50 milliGRAM(s) Oral every 6 hours PRN EPS ppx  LORazepam     Tablet 2 milliGRAM(s) Oral every 6 hours PRN agitation  LORazepam   Injectable 3 milliGRAM(s) IntraMuscular once PRN agitation  
MEDICATIONS  (PRN):  chlorproMAZINE    Injectable 100 milliGRAM(s) IntraMuscular once PRN severe agitation or aggression  chlorproMAZINE    Tablet 100 milliGRAM(s) Oral every 4 hours PRN severe agitation or aggression  diphenhydrAMINE 50 milliGRAM(s) Oral every 6 hours PRN EPS ppx  LORazepam     Tablet 2 milliGRAM(s) Oral every 6 hours PRN agitation  LORazepam   Injectable 3 milliGRAM(s) IntraMuscular once PRN agitation  LORazepam   Injectable 1 milliGRAM(s) IntraMuscular once PRN severe agitation  LORazepam   Injectable 1 milliGRAM(s) IntraMuscular once PRN severe agitation  
MEDICATIONS  (PRN):  chlorproMAZINE    Injectable 100 milliGRAM(s) IntraMuscular once PRN severe agitation or aggression  chlorproMAZINE    Tablet 100 milliGRAM(s) Oral every 4 hours PRN severe agitation or aggression  diphenhydrAMINE 50 milliGRAM(s) Oral every 6 hours PRN EPS ppx  LORazepam   Injectable 3 milliGRAM(s) IntraMuscular once PRN agitation  
MEDICATIONS  (PRN):  chlorproMAZINE    Injectable 100 milliGRAM(s) IntraMuscular once PRN severe agitation or aggression  chlorproMAZINE    Tablet 100 milliGRAM(s) Oral every 4 hours PRN severe agitation or aggression  diphenhydrAMINE 50 milliGRAM(s) Oral every 6 hours PRN EPS ppx  LORazepam     Tablet 2 milliGRAM(s) Oral every 6 hours PRN agitation  LORazepam   Injectable 3 milliGRAM(s) IntraMuscular once PRN agitation  
MEDICATIONS  (PRN):  chlorproMAZINE    Injectable 100 milliGRAM(s) IntraMuscular once PRN severe agitation or aggression  chlorproMAZINE    Tablet 100 milliGRAM(s) Oral every 4 hours PRN severe agitation or aggression  diphenhydrAMINE 50 milliGRAM(s) Oral every 6 hours PRN EPS ppx  LORazepam     Tablet 3 milliGRAM(s) Oral every 6 hours PRN agitation  LORazepam     Tablet 2 milliGRAM(s) Oral every 2 hours PRN CIWA score increase by 2 points and current CIWA score GREATER THAN 9  LORazepam   Injectable 3 milliGRAM(s) IntraMuscular once PRN agitation  
MEDICATIONS  (PRN):  chlorproMAZINE    Injectable 100 milliGRAM(s) IntraMuscular once PRN severe agitation or aggression  chlorproMAZINE    Tablet 100 milliGRAM(s) Oral every 4 hours PRN severe agitation or aggression  diphenhydrAMINE 50 milliGRAM(s) Oral every 6 hours PRN EPS ppx  LORazepam     Tablet 3 milliGRAM(s) Oral every 6 hours PRN agitation  LORazepam     Tablet 2 milliGRAM(s) Oral every 2 hours PRN CIWA score increase by 2 points and current CIWA score GREATER THAN 9  LORazepam   Injectable 3 milliGRAM(s) IntraMuscular once PRN agitation  
MEDICATIONS  (PRN):  chlorproMAZINE    Injectable 100 milliGRAM(s) IntraMuscular once PRN severe agitation or aggression  chlorproMAZINE    Tablet 100 milliGRAM(s) Oral every 4 hours PRN severe agitation or aggression  diphenhydrAMINE 50 milliGRAM(s) Oral every 6 hours PRN agitation/eps ppx  diphenhydrAMINE   Injectable 50 milliGRAM(s) IntraMuscular once PRN severe agitation/eps ppx  fluPHENAZine 5 milliGRAM(s) Oral every 6 hours PRN agitation  fluPHENAZine  Injectable 5 milliGRAM(s) IntraMuscular once PRN severe agitation  LORazepam     Tablet 2 milliGRAM(s) Oral every 6 hours PRN agitation  LORazepam     Tablet 2 milliGRAM(s) Oral every 2 hours PRN CIWA score increase by 2 points and current CIWA score GREATER THAN 9  LORazepam   Injectable 2 milliGRAM(s) IntraMuscular once PRN severe agitation  
MEDICATIONS  (PRN):  chlorproMAZINE    Injectable 100 milliGRAM(s) IntraMuscular once PRN severe agitation or aggression  chlorproMAZINE    Tablet 100 milliGRAM(s) Oral every 4 hours PRN severe agitation or aggression  diphenhydrAMINE 50 milliGRAM(s) Oral every 6 hours PRN EPS ppx  LORazepam     Tablet 2 milliGRAM(s) Oral every 6 hours PRN agitation  LORazepam   Injectable 3 milliGRAM(s) IntraMuscular once PRN agitation  
MEDICATIONS  (PRN):  chlorproMAZINE    Injectable 100 milliGRAM(s) IntraMuscular once PRN severe agitation or aggression  chlorproMAZINE    Tablet 100 milliGRAM(s) Oral every 4 hours PRN severe agitation or aggression  diphenhydrAMINE 50 milliGRAM(s) Oral every 6 hours PRN EPS ppx  
MEDICATIONS  (PRN):  chlorproMAZINE    Injectable 100 milliGRAM(s) IntraMuscular once PRN severe agitation or aggression  chlorproMAZINE    Tablet 100 milliGRAM(s) Oral every 4 hours PRN severe agitation or aggression  diphenhydrAMINE 50 milliGRAM(s) Oral every 6 hours PRN EPS ppx  LORazepam   Injectable 3 milliGRAM(s) IntraMuscular once PRN agitation  
MEDICATIONS  (PRN):  chlorproMAZINE    Injectable 100 milliGRAM(s) IntraMuscular once PRN severe agitation or aggression  chlorproMAZINE    Tablet 100 milliGRAM(s) Oral every 4 hours PRN severe agitation or aggression  diphenhydrAMINE 50 milliGRAM(s) Oral every 6 hours PRN EPS ppx  LORazepam   Injectable 3 milliGRAM(s) IntraMuscular once PRN agitation  
MEDICATIONS  (PRN):  chlorproMAZINE    Injectable 100 milliGRAM(s) IntraMuscular once PRN severe agitation or aggression  chlorproMAZINE    Injectable 100 milliGRAM(s) IntraMuscular once PRN severe agitation or aggression  chlorproMAZINE    Tablet 100 milliGRAM(s) Oral every 4 hours PRN severe agitation or aggression  diphenhydrAMINE 50 milliGRAM(s) Oral every 6 hours PRN EPS ppx  LORazepam     Tablet 2 milliGRAM(s) Oral every 6 hours PRN agitation  LORazepam   Injectable 3 milliGRAM(s) IntraMuscular once PRN agitation  
MEDICATIONS  (PRN):  chlorproMAZINE    Injectable 100 milliGRAM(s) IntraMuscular once PRN severe agitation or aggression  chlorproMAZINE    Tablet 100 milliGRAM(s) Oral every 4 hours PRN severe agitation or aggression  diphenhydrAMINE 50 milliGRAM(s) Oral every 6 hours PRN agitation/eps ppx  diphenhydrAMINE   Injectable 50 milliGRAM(s) IntraMuscular once PRN severe agitation/eps ppx  fluPHENAZine 5 milliGRAM(s) Oral every 6 hours PRN agitation  fluPHENAZine  Injectable 5 milliGRAM(s) IntraMuscular once PRN severe agitation  LORazepam     Tablet 2 milliGRAM(s) Oral every 6 hours PRN agitation  LORazepam     Tablet 2 milliGRAM(s) Oral every 2 hours PRN CIWA score increase by 2 points and current CIWA score GREATER THAN 9  LORazepam   Injectable 2 milliGRAM(s) IntraMuscular once PRN severe agitation  
MEDICATIONS  (PRN):  chlorproMAZINE    Injectable 100 milliGRAM(s) IntraMuscular once PRN severe agitation or aggression  chlorproMAZINE    Tablet 100 milliGRAM(s) Oral every 4 hours PRN severe agitation or aggression  diphenhydrAMINE 50 milliGRAM(s) Oral every 6 hours PRN EPS ppx  LORazepam     Tablet 2 milliGRAM(s) Oral every 6 hours PRN agitation  LORazepam   Injectable 3 milliGRAM(s) IntraMuscular once PRN agitation  
MEDICATIONS  (PRN):  chlorproMAZINE    Injectable 100 milliGRAM(s) IntraMuscular once PRN severe agitation or aggression  chlorproMAZINE    Tablet 100 milliGRAM(s) Oral every 4 hours PRN severe agitation or aggression  diphenhydrAMINE 50 milliGRAM(s) Oral every 6 hours PRN EPS ppx  LORazepam   Injectable 3 milliGRAM(s) IntraMuscular once PRN agitation  
MEDICATIONS  (PRN):  chlorproMAZINE    Injectable 100 milliGRAM(s) IntraMuscular once PRN severe agitation or aggression  chlorproMAZINE    Tablet 100 milliGRAM(s) Oral every 4 hours PRN severe agitation or aggression  diphenhydrAMINE 50 milliGRAM(s) Oral every 6 hours PRN EPS ppx  LORazepam     Tablet 2 milliGRAM(s) Oral every 6 hours PRN agitation  LORazepam   Injectable 3 milliGRAM(s) IntraMuscular once PRN agitation  
MEDICATIONS  (PRN):  chlorproMAZINE    Injectable 100 milliGRAM(s) IntraMuscular once PRN severe agitation or aggression  chlorproMAZINE    Tablet 100 milliGRAM(s) Oral every 4 hours PRN severe agitation or aggression  diphenhydrAMINE 50 milliGRAM(s) Oral every 6 hours PRN EPS ppx  LORazepam     Tablet 3 milliGRAM(s) Oral every 6 hours PRN agitation  LORazepam     Tablet 2 milliGRAM(s) Oral every 2 hours PRN CIWA score increase by 2 points and current CIWA score GREATER THAN 9  LORazepam   Injectable 3 milliGRAM(s) IntraMuscular once PRN agitation  
MEDICATIONS  (PRN):  chlorproMAZINE    Injectable 100 milliGRAM(s) IntraMuscular once PRN severe agitation or aggression  chlorproMAZINE    Tablet 100 milliGRAM(s) Oral every 4 hours PRN severe agitation or aggression  diphenhydrAMINE 50 milliGRAM(s) Oral every 6 hours PRN EPS ppx  
MEDICATIONS  (PRN):  chlorproMAZINE    Injectable 100 milliGRAM(s) IntraMuscular once PRN severe agitation or aggression  chlorproMAZINE    Tablet 100 milliGRAM(s) Oral every 4 hours PRN severe agitation or aggression  diphenhydrAMINE 50 milliGRAM(s) Oral every 6 hours PRN EPS ppx  LORazepam     Tablet 2 milliGRAM(s) Oral every 6 hours PRN agitation  LORazepam     Tablet 2 milliGRAM(s) Oral every 2 hours PRN CIWA score increase by 2 points and current CIWA score GREATER THAN 9  LORazepam   Injectable 3 milliGRAM(s) IntraMuscular once PRN agitation  
MEDICATIONS  (PRN):  chlorproMAZINE    Injectable 100 milliGRAM(s) IntraMuscular once PRN severe agitation or aggression  chlorproMAZINE    Injectable 100 milliGRAM(s) IntraMuscular once PRN severe agitation or aggression  chlorproMAZINE    Tablet 100 milliGRAM(s) Oral every 4 hours PRN severe agitation or aggression  diphenhydrAMINE 50 milliGRAM(s) Oral every 6 hours PRN EPS ppx  LORazepam     Tablet 2 milliGRAM(s) Oral every 6 hours PRN agitation  LORazepam   Injectable 3 milliGRAM(s) IntraMuscular once PRN agitation  
MEDICATIONS  (PRN):  chlorproMAZINE    Injectable 100 milliGRAM(s) IntraMuscular once PRN severe agitation or aggression  chlorproMAZINE    Tablet 100 milliGRAM(s) Oral every 4 hours PRN severe agitation or aggression  diphenhydrAMINE 50 milliGRAM(s) Oral every 6 hours PRN EPS ppx  LORazepam   Injectable 3 milliGRAM(s) IntraMuscular once PRN agitation  
MEDICATIONS  (PRN):  chlorproMAZINE    Injectable 100 milliGRAM(s) IntraMuscular once PRN severe agitation or aggression  chlorproMAZINE    Tablet 100 milliGRAM(s) Oral every 4 hours PRN severe agitation or aggression  diphenhydrAMINE 50 milliGRAM(s) Oral every 6 hours PRN EPS ppx  LORazepam     Tablet 2 milliGRAM(s) Oral every 6 hours PRN agitation  LORazepam   Injectable 3 milliGRAM(s) IntraMuscular once PRN agitation  
MEDICATIONS  (PRN):  chlorproMAZINE    Injectable 100 milliGRAM(s) IntraMuscular once PRN severe agitation or aggression  chlorproMAZINE    Tablet 100 milliGRAM(s) Oral every 4 hours PRN severe agitation or aggression  diphenhydrAMINE 50 milliGRAM(s) Oral every 6 hours PRN EPS ppx  LORazepam     Tablet 2 milliGRAM(s) Oral every 6 hours PRN agitation  LORazepam   Injectable 3 milliGRAM(s) IntraMuscular once PRN agitation  
MEDICATIONS  (PRN):  chlorproMAZINE    Injectable 100 milliGRAM(s) IntraMuscular once PRN severe agitation or aggression  chlorproMAZINE    Tablet 100 milliGRAM(s) Oral every 4 hours PRN severe agitation or aggression  diphenhydrAMINE 50 milliGRAM(s) Oral every 6 hours PRN EPS ppx  LORazepam   Injectable 3 milliGRAM(s) IntraMuscular once PRN agitation  LORazepam   Injectable 1 milliGRAM(s) IntraMuscular once PRN severe agitation  LORazepam   Injectable 1 milliGRAM(s) IntraMuscular once PRN severe agitation  
MEDICATIONS  (PRN):  chlorproMAZINE    Injectable 100 milliGRAM(s) IntraMuscular once PRN severe agitation or aggression  chlorproMAZINE    Tablet 100 milliGRAM(s) Oral every 4 hours PRN severe agitation or aggression  diphenhydrAMINE 50 milliGRAM(s) Oral every 6 hours PRN EPS ppx  LORazepam     Tablet 2 milliGRAM(s) Oral every 6 hours PRN agitation  LORazepam   Injectable 3 milliGRAM(s) IntraMuscular once PRN agitation  
MEDICATIONS  (PRN):  chlorproMAZINE    Injectable 100 milliGRAM(s) IntraMuscular once PRN severe agitation or aggression  chlorproMAZINE    Tablet 100 milliGRAM(s) Oral every 4 hours PRN severe agitation or aggression  diphenhydrAMINE 50 milliGRAM(s) Oral every 6 hours PRN EPS ppx  LORazepam   Injectable 3 milliGRAM(s) IntraMuscular once PRN agitation  
MEDICATIONS  (PRN):  chlorproMAZINE    Injectable 100 milliGRAM(s) IntraMuscular once PRN severe agitation or aggression  chlorproMAZINE    Tablet 100 milliGRAM(s) Oral every 4 hours PRN severe agitation or aggression  diphenhydrAMINE 50 milliGRAM(s) Oral every 6 hours PRN EPS ppx  LORazepam   Injectable 3 milliGRAM(s) IntraMuscular once PRN agitation

## 2021-03-05 NOTE — BH INPATIENT PSYCHIATRY PROGRESS NOTE - NSBHMSEKNOWHOW_PSY_ALL_CORE
Current Events
Current Events/Educational attainment/Vocabulary/Other...
Current Events
Current Events/Educational attainment/Vocabulary/Other...
Current Events

## 2021-03-05 NOTE — BH INPATIENT PSYCHIATRY PROGRESS NOTE - NSTXDISORGDATEEST_PSY_ALL_CORE
22-Jan-2021
03-Mar-2021
22-Jan-2021

## 2021-03-05 NOTE — BH INPATIENT PSYCHIATRY PROGRESS NOTE - NSBHMSEREMMEM_PSY_A_CORE
Unable to assess
Normal
Unable to assess
Unable to assess
Normal
Unable to assess
Normal
Unable to assess
Normal
Unable to assess
Normal
Unable to assess
Normal
Unable to assess

## 2021-03-05 NOTE — BH INPATIENT PSYCHIATRY PROGRESS NOTE - NSTXMANICDATETRGT_PSY_ALL_CORE
01-Feb-2021
17-Feb-2021
01-Feb-2021
17-Feb-2021
01-Feb-2021
17-Feb-2021
17-Feb-2021
01-Feb-2021
17-Feb-2021
01-Feb-2021
17-Feb-2021
17-Feb-2021
01-Feb-2021
10-Mar-2021
17-Feb-2021

## 2021-03-05 NOTE — BH INPATIENT PSYCHIATRY PROGRESS NOTE - NSBHMSEBODY_PSY_A_CORE
Well nourished

## 2021-03-05 NOTE — BH INPATIENT PSYCHIATRY PROGRESS NOTE - NSTXPSYCHOGOAL_PSY_ALL_CORE
Make at least 5 reality based statements/requests to staff and/or peers
Will engage in a 15 minute conversation with no irrational content
Will identify 1 trigger/stressor that exacerbates thoughts
Will identify 1 trigger/stressor that exacerbates thoughts
Will verbalize 1 strategy to successfully cope with psychotic symptoms
Will be able to report experiencing hallucinations to staff
Will verbalize 1 strategy to successfully cope with psychotic symptoms
Will identify 1 trigger/stressor that exacerbates hallucinations
Will identify 1 trigger/stressor that exacerbates thoughts
Will be able to report experiencing hallucinations to staff
Will engage in a 15 minute conversation with no irrational content
Will verbalize 1 strategy to successfully cope with psychotic symptoms
Will be able to report experiencing hallucinations to staff
Make at least 5 reality based statements/requests to staff and/or peers
Will engage in a 15 minute conversation with no irrational content
Will identify 1 trigger/stressor that exacerbates thoughts
Will identify 1 trigger/stressor that exacerbates thoughts
Will engage in a 15 minute conversation with no irrational content
Will identify 1 trigger/stressor that exacerbates thoughts
Will verbalize 1 strategy to successfully cope with psychotic symptoms
Will be able to report experiencing hallucinations to staff
Make at least 5 reality based statements/requests to staff and/or peers
Will be able to report experiencing hallucinations to staff
Will verbalize 1 strategy to successfully cope with psychotic symptoms
Will verbalize 1 strategy to successfully cope with psychotic symptoms

## 2021-03-05 NOTE — BH INPATIENT PSYCHIATRY PROGRESS NOTE - NSTXDISORGINTERMD_PSY_ALL_CORE
Prolixin with Lithobid
Invega started with Seroquel, added Depakote ER
Prolixin Decanoate and Aristada RENO with Lithium Eskalith CR titration
Prolixin with Lithobid
Prolixin and Abilify with Lithobid
Prolixin with Lithobid
Prolixin Decanoate and Aristada RENO with Lithium Eskalith CR titration
Prolixin and Abilify with Lithobid
Prolixin with Lithobid
Prolixin with Lithobid
Prolixin Decanoate and Aristada RENO with Lithium Eskalith CR titration
Invega started with Seroquel, added Depakote ER
Invega started with Seroquel, added Depakote ER
Prolixin with Lithobid
Prolixin Decanoate and Aristada RENO with Lithium Eskalith CR titration
Prolixin with Lithobid
Prolixin and Abilify with Lithobid
Prolixin Decanoate and Aristada RENO with Lithium Eskalith CR titration

## 2021-03-05 NOTE — BH INPATIENT PSYCHIATRY PROGRESS NOTE - NSBHMETABOLICLABS_PSY_ALL_CORE
Labs within last 12 months

## 2021-03-05 NOTE — BH INPATIENT PSYCHIATRY PROGRESS NOTE - NSBHMSETHTASSOC_PSY_A_CORE
Normal
Loose
Normal
Normal
Unable to assess
Loose
Normal
Loose
Unable to assess
Normal

## 2021-03-05 NOTE — BH INPATIENT PSYCHIATRY PROGRESS NOTE - NSTXIMPULSGOAL_PSY_ALL_CORE
Will be able to describe/demonstrate alternative ways of managing his/her emotional state on the unit
Will be able to demonstrate the ability to pause before acting out negatively
Will be able to demonstrate the ability to pause before acting out negatively
Will be able to describe/demonstrate alternative ways of managing his/her emotional state on the unit
Will be able to demonstrate the ability to pause before acting out negatively
Will identify 1 behavior to cope with impulsive urges
Will be able to describe/demonstrate alternative ways of managing his/her emotional state on the unit
Will be able to demonstrate the ability to pause before acting out negatively
Will identify 1 behavior to cope with impulsive urges
Will be able to demonstrate the ability to pause before acting out negatively
Will be able to describe/demonstrate alternative ways of managing his/her emotional state on the unit
Will identify 1 reason or reinforcement for impulsive behavior
Will identify 1 behavior to cope with impulsive urges
Will identify 1 behavior to cope with impulsive urges
Will be able to describe/demonstrate alternative ways of managing his/her emotional state on the unit
Will identify 1 behavior to cope with impulsive urges
Will identify 1 behavior to cope with impulsive urges

## 2021-03-05 NOTE — BH INPATIENT PSYCHIATRY PROGRESS NOTE - OTHER
avoidant themes, attenuated Does have element of pseudocompliance stating what he thinks interviewer would like to hear, ongoing but attenuated. pt narrative

## 2021-03-05 NOTE — BH INPATIENT PSYCHIATRY PROGRESS NOTE - NSTXMANICGOAL_PSY_ALL_CORE
Exhibit a substantial reduction in elated/angry acting out, and pressured speech that prevents mutual conversation
Demonstrate a substantial reduction in both hyperactive pacing on the unit and social intrusiveness
State a reason daily why adherence to mood stabilizers is necessary
Exhibit a substantial reduction in elated/angry acting out, and pressured speech that prevents mutual conversation
Demonstrate a substantial reduction in both hyperactive pacing on the unit and social intrusiveness
Exhibit a substantial reduction in elated/angry acting out, and pressured speech that prevents mutual conversation
Demonstrate a substantial reduction in both hyperactive pacing on the unit and social intrusiveness
State a reason daily why adherence to mood stabilizers is necessary
Exhibit a substantial reduction in elated/angry acting out, and pressured speech that prevents mutual conversation
Demonstrate a substantial reduction in both hyperactive pacing on the unit and social intrusiveness
State a reason daily why adherence to mood stabilizers is necessary
Demonstrate a substantial reduction in both hyperactive pacing on the unit and social intrusiveness
Exhibit a substantial reduction in elated/angry acting out, and pressured speech that prevents mutual conversation
Demonstrate a substantial reduction in both hyperactive pacing on the unit and social intrusiveness
Exhibit a substantial reduction in elated/angry acting out, and pressured speech that prevents mutual conversation
State a reason daily why adherence to mood stabilizers is necessary
State a reason daily why adherence to mood stabilizers is necessary
Demonstrate a substantial reduction in both hyperactive pacing on the unit and social intrusiveness
Exhibit a substantial reduction in elated/angry acting out, and pressured speech that prevents mutual conversation
Exhibit a substantial reduction in elated/angry acting out, and pressured speech that prevents mutual conversation

## 2021-03-05 NOTE — BH INPATIENT PSYCHIATRY PROGRESS NOTE - NSBHMSEGROOM_PSY_A_CORE
Poor
Fair
Poor
Fair
Good
Fair
Poor
Fair
Fair
Poor
Fair
Poor
Fair
Poor
Fair
Poor
Good
Fair
Fair
Poor
Fair

## 2021-03-05 NOTE — BH INPATIENT PSYCHIATRY PROGRESS NOTE - NSTXIMPULSPROGRES_PSY_ALL_CORE
Improving
Met - goal discontinued
No Change
Met - goal discontinued
Improving
No Change
Met - goal discontinued
Met - goal discontinued
Improving
Met - goal discontinued
Met - goal discontinued
Improving
No Change
Improving
Met - goal discontinued
Met - goal discontinued
No Change
No Change
Met - goal discontinued
Met - goal discontinued
No Change
Improving
Met - goal discontinued
Met - goal discontinued

## 2021-03-05 NOTE — BH INPATIENT PSYCHIATRY PROGRESS NOTE - NSTXDCOPNOINTERMD_PSY_ALL_CORE
SW to coordinate care, has ACT and AOT

## 2021-03-05 NOTE — BH INPATIENT PSYCHIATRY PROGRESS NOTE - NSBHMSESPEECH_PSY_A_CORE
Abnormal as indicated, otherwise normal...
Normal volume, rate, productivity, spontaneity and articulation
Abnormal as indicated, otherwise normal...
Normal volume, rate, productivity, spontaneity and articulation
Normal volume, rate, productivity, spontaneity and articulation
Abnormal as indicated, otherwise normal...
Abnormal as indicated, otherwise normal...
Normal volume, rate, productivity, spontaneity and articulation
Abnormal as indicated, otherwise normal...
Normal volume, rate, productivity, spontaneity and articulation
Normal volume, rate, productivity, spontaneity and articulation
Abnormal as indicated, otherwise normal...
Normal volume, rate, productivity, spontaneity and articulation
Abnormal as indicated, otherwise normal...
Normal volume, rate, productivity, spontaneity and articulation
Abnormal as indicated, otherwise normal...
Normal volume, rate, productivity, spontaneity and articulation
1.52

## 2021-03-05 NOTE — BH INPATIENT PSYCHIATRY PROGRESS NOTE - NSBHCONSULTIPREASON_PSY_A_CORE
danger to others; mental illness expected to respond to inpatient care

## 2021-03-05 NOTE — BH INPATIENT PSYCHIATRY PROGRESS NOTE - LEVEL OF CONSCIOUSNESS
Alert

## 2021-03-05 NOTE — BH INPATIENT PSYCHIATRY PROGRESS NOTE - NSTXSUICIDGOAL_PSY_ALL_CORE
Talk to staff and ask for assistance when having suicidal wishes instead of acting out

## 2021-03-05 NOTE — BH INPATIENT PSYCHIATRY PROGRESS NOTE - NSBHMSEMOVE_PSY_A_CORE
No abnormal movements

## 2021-03-05 NOTE — BH INPATIENT PSYCHIATRY PROGRESS NOTE - NSTXDCHOUSDATETRGT_PSY_ALL_CORE
24-Feb-2021
05-Mar-2021
24-Feb-2021
05-Mar-2021
24-Feb-2021
05-Mar-2021
05-Mar-2021
24-Feb-2021
05-Mar-2021
24-Feb-2021

## 2021-03-05 NOTE — BH INPATIENT PSYCHIATRY PROGRESS NOTE - NSTXIMPULSDATETRGT_PSY_ALL_CORE
01-Feb-2021
17-Feb-2021
01-Feb-2021
17-Feb-2021
01-Feb-2021
17-Feb-2021
01-Feb-2021
10-Mar-2021
17-Feb-2021

## 2021-03-05 NOTE — BH INPATIENT PSYCHIATRY PROGRESS NOTE - NSBHMSEKNOW_PSY_A_CORE
Normal
Unable to assess
Normal
Unable to assess
Normal

## 2021-03-05 NOTE — BH INPATIENT PSYCHIATRY PROGRESS NOTE - NSTXDISORGGOAL_PSY_ALL_CORE
Will identify 2 coping skills that assist in organizing
Will identify 2 coping skills that assist in organizing
Will demonstrate related thoughts for 5 min in conversation
Will identify 2 coping skills that assist in organizing
Will identify 2 coping skills that assist in organizing
Will demonstrate related thoughts for 5 min in conversation
Will identify 2 coping skills that assist in organizing
Will demonstrate related thoughts for 5 min in conversation
Will verbalize 1 strategy to successfully cope with disturbed thinking
Will identify 2 coping skills that assist in organizing
Will verbalize 1 strategy to successfully cope with disturbed thinking
Will demonstrate related thoughts for 5 min in conversation
Will verbalize 1 strategy to successfully cope with disturbed thinking
Will demonstrate related thoughts for 5 min in conversation
Will verbalize 1 strategy to successfully cope with disturbed thinking
Will demonstrate related thoughts for 5 min in conversation
Will identify 2 coping skills that assist in organizing
Will demonstrate related thoughts for 5 min in conversation
Will verbalize 1 strategy to successfully cope with disturbed thinking
Will demonstrate related thoughts for 5 min in conversation
Will make at least 3 goal and reality oriented statements during therapy

## 2021-03-05 NOTE — BH INPATIENT PSYCHIATRY PROGRESS NOTE - GENERAL APPEARANCE
Well developed/No deformities present

## 2021-03-05 NOTE — BH INPATIENT PSYCHIATRY PROGRESS NOTE - NSBHMSEEYE_PSY_A_CORE
Good
Poor
Good
Good
Fair
Poor
Good
Poor
Good
Fair
Fair
Good
Good
Poor
Good
Poor
Good

## 2021-03-05 NOTE — BH INPATIENT PSYCHIATRY PROGRESS NOTE - NSTXPSYCHOINTERMD_PSY_ALL_CORE
Prolixin and Lithobid
Prolixin with Abilify and Lithobid
Prolixin and Lithobid
Prolixin with Abilify and Lithobid
Prolixin and Lithobid
Prolixin with Abilify and Lithobid
Prolixin and Lithobid
Invega started with Seroquel, added Depakote ER
Prolixin and Lithobid
Invega started with Seroquel, added Depakote ER
Invega started with Seroquel, added Depakote ER
Prolixin and Lithobid
Prolixin with Abilify and Lithobid

## 2021-03-05 NOTE — BH INPATIENT PSYCHIATRY PROGRESS NOTE - NSBHPSYCHOLCOGORIENT_PSY_A_CORE
Oriented to time, place, person, situation
Unable to assess
Oriented to time, place, person, situation
Unable to assess
Oriented to time, place, person, situation

## 2021-03-05 NOTE — BH INPATIENT PSYCHIATRY PROGRESS NOTE - NSTXPROBDCOPNO_PSY_ALL_CORE
DISCHARGE ISSUE - NON-ADHERENT WITH OUTPATIENT SERVICES

## 2021-03-05 NOTE — BH INPATIENT PSYCHIATRY PROGRESS NOTE - NSBHMSEHYG_PSY_A_CORE
Fair
Poor
Poor
Fair
Fair
Poor
Fair
Poor
Fair
Fair
Poor
Fair
Poor
Poor
Fair
Poor
Poor
Fair

## 2021-03-05 NOTE — BH INPATIENT PSYCHIATRY PROGRESS NOTE - NSBHMSEPERCEPT_PSY_A_CORE
No abnormalities
Other
No abnormalities
Unable to assess
No abnormalities
Other
Unable to assess
No abnormalities
Other
Other
No abnormalities
Other
No abnormalities

## 2021-03-05 NOTE — BH INPATIENT PSYCHIATRY PROGRESS NOTE - NS ED [facility] MED ROS PSYCHIATRIC
See HPI

## 2021-03-05 NOTE — BH INPATIENT PSYCHIATRY PROGRESS NOTE - NSTXCOPEGOAL_PSY_ALL_CORE
Identify and utilize 2 coping skills that meet their needs
Other...
Other...
Identify and utilize 2 coping skills that meet their needs
Other...
Identify and utilize 2 coping skills that meet their needs
Other...
Other...
Identify and utilize 2 coping skills that meet their needs
Other...
Identify and utilize 2 coping skills that meet their needs
Other...
Identify and utilize 2 coping skills that meet their needs

## 2021-03-05 NOTE — BH INPATIENT PSYCHIATRY PROGRESS NOTE - NSBHMSEAFFCONG_PSY_A_CORE
Congruent
Congruent
Unable to assess
Congruent
Unable to assess
Congruent

## 2021-03-05 NOTE — BH INPATIENT PSYCHIATRY PROGRESS NOTE - NSBHMSEATTEN_PSY_A_CORE
Normal
Impaired
Other
Normal
Impaired
Impaired
Other
Other
Normal
Unable to assess
Impaired
Impaired
Normal
Impaired
Normal
Other
Impaired
Impaired
Other
Impaired
Normal
Other
Unable to assess
Impaired
Other
Impaired
Impaired
Normal
Normal

## 2021-03-05 NOTE — BH INPATIENT PSYCHIATRY PROGRESS NOTE - NSTXIMPULSDATEEST_PSY_ALL_CORE
10-Feb-2021
25-Jan-2021
03-Mar-2021
10-Feb-2021
25-Jan-2021
25-Jan-2021
10-Feb-2021
25-Jan-2021
10-Feb-2021
25-Jan-2021
10-Feb-2021

## 2021-03-05 NOTE — BH INPATIENT PSYCHIATRY PROGRESS NOTE - NSBHMSEAFFQUAL_PSY_A_CORE
Irritable
Irritable
Euthymic
Irritable
Unable to assess
Irritable
Euthymic
Unable to assess
Euthymic/Anxious
Euthymic
Irritable
Euthymic
Irritable
Irritable
Euthymic
Euthymic
Euthymic/Irritable
Euthymic
Irritable
Euthymic
Irritable
Euthymic
Irritable

## 2021-03-05 NOTE — BH INPATIENT PSYCHIATRY PROGRESS NOTE - NSBHMSEINSIGHT_PSY_A_CORE
Poor
Fair
Poor
Fair
Unable to assess
Fair
Poor
Fair
Poor
Fair
Poor
Fair
Poor
Fair

## 2021-03-05 NOTE — BH INPATIENT PSYCHIATRY PROGRESS NOTE - NSBHMSEINTELL_PSY_A_CORE
Below Average
Unable to assess
Unable to assess
Below Average
Average

## 2021-03-05 NOTE — BH INPATIENT PSYCHIATRY PROGRESS NOTE - NSBHCHARTREVIEWVS_PSY_A_CORE FT
Vital Signs Last 24 Hrs  T(C): 35.9 (28 Jan 2021 06:32), Max: 36.3 (27 Jan 2021 19:05)  T(F): 96.7 (28 Jan 2021 06:32), Max: 97.4 (27 Jan 2021 19:05)  HR: 88 (28 Jan 2021 06:32) (88 - 88)  BP: 116/80 (28 Jan 2021 06:32) (116/80 - 116/80)  BP(mean): --  RR: --  SpO2: --
Vital Signs Last 24 Hrs  T(C): 36.7 (04 Mar 2021 20:15), Max: 36.7 (04 Mar 2021 07:34)  T(F): 98 (04 Mar 2021 20:15), Max: 98.1 (04 Mar 2021 07:34)  HR: 61 (04 Mar 2021 07:34) (61 - 61)  BP: 107/77 (04 Mar 2021 07:34) (107/77 - 107/77)  BP(mean): --  RR: --  SpO2: --
Vital Signs Last 24 Hrs  T(C): 36.7 (01 Mar 2021 19:23), Max: 36.7 (01 Mar 2021 19:23)  T(F): 98 (01 Mar 2021 19:23), Max: 98 (01 Mar 2021 19:23)  HR: --  BP: --  BP(mean): --  RR: --  SpO2: --
Vital Signs Last 24 Hrs  T(C): 36.3 (23 Feb 2021 06:35), Max: 36.7 (22 Feb 2021 20:04)  T(F): 97.4 (23 Feb 2021 06:35), Max: 98 (22 Feb 2021 20:04)  HR: 84 (23 Feb 2021 06:35) (84 - 84)  BP: 123/81 (23 Feb 2021 06:35) (123/81 - 123/81)  BP(mean): --  RR: --  SpO2: --
Vital Signs Last 24 Hrs  T(C): 36.6 (29 Jan 2021 06:55), Max: 37.3 (28 Jan 2021 19:03)  T(F): 97.8 (29 Jan 2021 06:55), Max: 99.2 (28 Jan 2021 19:03)  HR: --  BP: --  BP(mean): --  RR: --  SpO2: --
Vital Signs Last 24 Hrs  T(C): 36.7 (23 Jan 2021 07:29), Max: 36.7 (23 Jan 2021 07:29)  T(F): 98.1 (23 Jan 2021 07:29), Max: 98.1 (23 Jan 2021 07:29)  HR: 98 (23 Jan 2021 07:29) (98 - 98)  BP: 126/94 (23 Jan 2021 07:29) (126/94 - 126/94)  BP(mean): --  RR: --  SpO2: --
Vital Signs Last 24 Hrs  T(C): 35.9 (26 Jan 2021 06:30), Max: 36.7 (25 Jan 2021 19:16)  T(F): 96.7 (26 Jan 2021 06:30), Max: 98 (25 Jan 2021 19:16)  HR: 102 (26 Jan 2021 06:30) (102 - 102)  BP: 100/62 (26 Jan 2021 06:30) (100/62 - 100/62)  BP(mean): --  RR: --  SpO2: --
Vital Signs Last 24 Hrs  T(C): 36.1 (25 Jan 2021 07:38), Max: 36.8 (24 Jan 2021 19:37)  T(F): 97 (25 Jan 2021 07:38), Max: 98.2 (24 Jan 2021 19:37)  HR: 91 (25 Jan 2021 07:38) (91 - 91)  BP: 114/93 (25 Jan 2021 07:38) (114/93 - 114/93)  BP(mean): --  RR: --  SpO2: --
Vital Signs Last 24 Hrs  T(C): 36.7 (05 Feb 2021 08:37), Max: 36.7 (05 Feb 2021 08:37)  T(F): 98.1 (05 Feb 2021 08:37), Max: 98.1 (05 Feb 2021 08:37)  HR: --  BP: --  BP(mean): --  RR: --  SpO2: --
Vital Signs Last 24 Hrs  T(C): 36.8 (10 Feb 2021 08:23), Max: 36.8 (10 Feb 2021 08:23)  T(F): 98.3 (10 Feb 2021 08:23), Max: 98.3 (10 Feb 2021 08:23)  HR: --  BP: --  BP(mean): --  RR: --  SpO2: --
Vital Signs Last 24 Hrs  T(C): 36.6 (01 Mar 2021 07:57), Max: 36.6 (01 Mar 2021 07:57)  T(F): 97.9 (01 Mar 2021 07:57), Max: 97.9 (01 Mar 2021 07:57)  HR: 82 (01 Mar 2021 07:57) (82 - 82)  BP: 118/84 (01 Mar 2021 07:57) (118/84 - 118/84)  BP(mean): --  RR: --  SpO2: --
Vital Signs Last 24 Hrs  T(C): 36.6 (26 Feb 2021 06:46), Max: 36.9 (25 Feb 2021 18:18)  T(F): 97.8 (26 Feb 2021 06:46), Max: 98.5 (25 Feb 2021 18:18)  HR: 83 (26 Feb 2021 06:46) (83 - 83)  BP: 115/82 (26 Feb 2021 06:46) (115/82 - 115/82)  BP(mean): --  RR: --  SpO2: --
Vital Signs Last 24 Hrs  T(C): 36.4 (01 Feb 2021 07:01), Max: 36.4 (01 Feb 2021 07:01)  T(F): 97.6 (01 Feb 2021 07:01), Max: 97.6 (01 Feb 2021 07:01)  HR: 79 (01 Feb 2021 07:01) (79 - 79)  BP: 110/67 (01 Feb 2021 07:01) (110/67 - 110/67)  BP(mean): --  RR: --  SpO2: --
Vital Signs Last 24 Hrs  T(C): 36.6 (04 Feb 2021 07:02), Max: 36.6 (04 Feb 2021 07:02)  T(F): 97.9 (04 Feb 2021 07:02), Max: 97.9 (04 Feb 2021 07:02)  HR: --  BP: 114/73 (04 Feb 2021 07:02) (114/73 - 114/73)  BP(mean): 66 (04 Feb 2021 07:02) (66 - 66)  RR: --  SpO2: --
Vital Signs Last 24 Hrs  T(C): 36.6 (02 Feb 2021 06:53), Max: 36.8 (01 Feb 2021 19:47)  T(F): 97.9 (02 Feb 2021 06:53), Max: 98.2 (01 Feb 2021 19:47)  HR: --  BP: --  BP(mean): --  RR: --  SpO2: --
Vital Signs Last 24 Hrs  T(C): 36.7 (16 Feb 2021 06:23), Max: 36.7 (16 Feb 2021 06:23)  T(F): 98 (16 Feb 2021 06:23), Max: 98 (16 Feb 2021 06:23)  HR: --  BP: --  BP(mean): --  RR: --  SpO2: --
Vital Signs Last 24 Hrs  T(C): 36.6 (24 Jan 2021 06:17), Max: 37 (23 Jan 2021 19:20)  T(F): 97.9 (24 Jan 2021 06:17), Max: 98.6 (23 Jan 2021 19:20)  HR: 90 (24 Jan 2021 06:17) (90 - 90)  BP: 128/86 (24 Jan 2021 06:17) (128/86 - 128/86)  BP(mean): --  RR: --  SpO2: --
Vital Signs Last 24 Hrs  T(C): 36.4 (12 Feb 2021 08:30), Max: 36.6 (11 Feb 2021 19:27)  T(F): 97.6 (12 Feb 2021 08:30), Max: 97.8 (11 Feb 2021 19:27)  HR: 63 (12 Feb 2021 08:30) (63 - 63)  BP: 118/79 (12 Feb 2021 08:30) (118/79 - 118/79)  BP(mean): --  RR: --  SpO2: --
Vital Signs Last 24 Hrs  T(C): 36.7 (09 Feb 2021 08:33), Max: 36.7 (08 Feb 2021 19:17)  T(F): 98 (09 Feb 2021 08:33), Max: 98 (08 Feb 2021 19:17)  HR: --  BP: --  BP(mean): --  RR: --  SpO2: --
Vital Signs Last 24 Hrs  T(C): 36.9 (05 Mar 2021 06:21), Max: 36.9 (05 Mar 2021 06:21)  T(F): 98.4 (05 Mar 2021 06:21), Max: 98.4 (05 Mar 2021 06:21)  HR: 66 (05 Mar 2021 06:21) (66 - 66)  BP: 118/72 (05 Mar 2021 06:21) (118/72 - 118/72)  BP(mean): --  RR: --  SpO2: --
Vital Signs Last 24 Hrs  T(C): 36.9 (03 Mar 2021 07:57), Max: 36.9 (02 Mar 2021 18:35)  T(F): 98.5 (03 Mar 2021 07:57), Max: 98.5 (02 Mar 2021 18:35)  HR: --  BP: --  BP(mean): --  RR: --  SpO2: --
Vital Signs Last 24 Hrs  T(C): 35.6 (11 Feb 2021 08:35), Max: 36.5 (10 Feb 2021 19:18)  T(F): 96.1 (11 Feb 2021 08:35), Max: 97.7 (10 Feb 2021 19:18)  HR: 72 (11 Feb 2021 08:35) (72 - 72)  BP: 116/81 (11 Feb 2021 08:35) (116/81 - 116/81)  BP(mean): --  RR: --  SpO2: --
Vital Signs Last 24 Hrs  T(C): 36.5 (17 Feb 2021 19:28), Max: 36.5 (17 Feb 2021 19:28)  T(F): 97.7 (17 Feb 2021 19:28), Max: 97.7 (17 Feb 2021 19:28)  HR: --  BP: --  BP(mean): --  RR: --  SpO2: --
Vital Signs Last 24 Hrs  T(C): 36.3 (24 Feb 2021 18:09), Max: 36.3 (24 Feb 2021 18:09)  T(F): 97.4 (24 Feb 2021 18:09), Max: 97.4 (24 Feb 2021 18:09)  HR: --  BP: --  BP(mean): --  RR: --  SpO2: --
Vital Signs Last 24 Hrs  T(C): 36.6 (27 Jan 2021 08:13), Max: 36.9 (26 Jan 2021 19:34)  T(F): 97.8 (27 Jan 2021 08:13), Max: 98.5 (26 Jan 2021 19:34)  HR: 99 (27 Jan 2021 08:13) (99 - 99)  BP: 109/76 (27 Jan 2021 08:13) (109/76 - 109/76)  BP(mean): --  RR: --  SpO2: --
Vital Signs Last 24 Hrs  T(C): 36.4 (08 Feb 2021 07:51), Max: 36.8 (07 Feb 2021 19:22)  T(F): 97.5 (08 Feb 2021 07:51), Max: 98.2 (07 Feb 2021 19:22)  HR: --  BP: --  BP(mean): --  RR: --  SpO2: --
Vital Signs Last 24 Hrs  T(C): 36.4 (22 Feb 2021 08:10), Max: 36.8 (21 Feb 2021 19:37)  T(F): 97.5 (22 Feb 2021 08:10), Max: 98.2 (21 Feb 2021 19:37)  HR: --  BP: --  BP(mean): --  RR: --  SpO2: --
Vital Signs Last 24 Hrs  T(C): 36.8 (23 Feb 2021 18:11), Max: 36.8 (23 Feb 2021 18:11)  T(F): 98.2 (23 Feb 2021 18:11), Max: 98.2 (23 Feb 2021 18:11)  HR: --  BP: --  BP(mean): --  RR: --  SpO2: --
Vital Signs Last 24 Hrs  T(C): 36.9 (03 Feb 2021 07:55), Max: 36.9 (03 Feb 2021 07:55)  T(F): 98.4 (03 Feb 2021 07:55), Max: 98.4 (03 Feb 2021 07:55)  HR: --  BP: --  BP(mean): --  RR: --  SpO2: --
Vital Signs Last 24 Hrs  T(C): 36.1 (16 Feb 2021 18:56), Max: 36.1 (16 Feb 2021 18:56)  T(F): 96.9 (16 Feb 2021 18:56), Max: 96.9 (16 Feb 2021 18:56)  HR: --  BP: --  BP(mean): --  RR: --  SpO2: --
Vital Signs Last 24 Hrs  T(C): 36.4 (19 Feb 2021 07:56), Max: 36.4 (19 Feb 2021 07:56)  T(F): 97.5 (19 Feb 2021 07:56), Max: 97.5 (19 Feb 2021 07:56)  HR: --  BP: 125/72 (19 Feb 2021 07:56) (125/72 - 125/72)  BP(mean): 67 (19 Feb 2021 07:56) (67 - 67)  RR: --  SpO2: --

## 2021-03-05 NOTE — BH INPATIENT PSYCHIATRY PROGRESS NOTE - NSBHATTESTSEENBY_PSY_A_CORE
NP without Attending Psychiatrist
attending Psychiatrist without NP/Trainee
attending Psychiatrist without NP/Trainee
NP without Attending Psychiatrist

## 2021-03-05 NOTE — BH INPATIENT PSYCHIATRY PROGRESS NOTE - NSTXDCOPNODATENEW_PSY_ALL_CORE
19-Feb-2021

## 2021-03-05 NOTE — BH INPATIENT PSYCHIATRY PROGRESS NOTE - NSTXCOPEDATEEST_PSY_ALL_CORE
11-Feb-2021
23-Jan-2021
23-Jan-2021
11-Feb-2021

## 2021-03-05 NOTE — BH INPATIENT PSYCHIATRY PROGRESS NOTE - NSTXDCHOUSGOAL_PSY_ALL_CORE
Last visit 10/10/2017  Last filled 4/10/2017    
Other...

## 2021-03-05 NOTE — BH INPATIENT PSYCHIATRY PROGRESS NOTE - NSTXPROBDISORG_PSY_ALL_CORE
DISORGANIZATION OF THOUGHT/BEHAVIOR

## 2021-03-16 ENCOUNTER — INPATIENT (INPATIENT)
Facility: HOSPITAL | Age: 46
LOS: 20 days | Discharge: HOME | End: 2021-04-06
Attending: PSYCHIATRY & NEUROLOGY | Admitting: PSYCHIATRY & NEUROLOGY
Payer: MEDICARE

## 2021-03-16 VITALS — TEMPERATURE: 98 F | RESPIRATION RATE: 17 BRPM | OXYGEN SATURATION: 100 %

## 2021-03-16 DIAGNOSIS — F25.9 SCHIZOAFFECTIVE DISORDER, UNSPECIFIED: ICD-10-CM

## 2021-03-16 RX ORDER — LANOLIN ALCOHOL/MO/W.PET/CERES
5 CREAM (GRAM) TOPICAL AT BEDTIME
Refills: 0 | Status: DISCONTINUED | OUTPATIENT
Start: 2021-03-17 | End: 2021-03-17

## 2021-03-16 RX ORDER — LITHIUM CARBONATE 300 MG/1
1350 TABLET, EXTENDED RELEASE ORAL AT BEDTIME
Refills: 0 | Status: DISCONTINUED | OUTPATIENT
Start: 2021-03-17 | End: 2021-03-18

## 2021-03-16 RX ORDER — BENZTROPINE MESYLATE 1 MG
2 TABLET ORAL AT BEDTIME
Refills: 0 | Status: DISCONTINUED | OUTPATIENT
Start: 2021-03-16 | End: 2021-04-06

## 2021-03-16 NOTE — BH PATIENT PROFILE - HOME MEDICATIONS
nicotine 21 mg/24 hr transdermal film, extended release , 21 milligram(s) transdermal once a day  melatonin 5 mg oral tablet , 1 tab(s) orally once a day (at bedtime)  benztropine 2 mg oral tablet , 1 tab(s) orally once a day (at bedtime)  lithium 450 mg oral tablet, extended release , 3 tab(s) orally once a day (at bedtime)

## 2021-03-17 PROCEDURE — 99232 SBSQ HOSP IP/OBS MODERATE 35: CPT

## 2021-03-17 RX ORDER — HYDROXYZINE HCL 10 MG
50 TABLET ORAL EVERY 6 HOURS
Refills: 0 | Status: DISCONTINUED | OUTPATIENT
Start: 2021-03-16 | End: 2021-03-18

## 2021-03-17 RX ORDER — HALOPERIDOL DECANOATE 100 MG/ML
5 INJECTION INTRAMUSCULAR ONCE
Refills: 0 | Status: DISCONTINUED | OUTPATIENT
Start: 2021-03-17 | End: 2021-04-06

## 2021-03-17 RX ORDER — DIPHENHYDRAMINE HCL 50 MG
50 CAPSULE ORAL ONCE
Refills: 0 | Status: DISCONTINUED | OUTPATIENT
Start: 2021-03-17 | End: 2021-04-06

## 2021-03-17 RX ADMIN — LITHIUM CARBONATE 1350 MILLIGRAM(S): 300 TABLET, EXTENDED RELEASE ORAL at 20:48

## 2021-03-17 NOTE — BH INPATIENT PSYCHIATRY PROGRESS NOTE - NSBHASSESSSUMMFT_PSY_ALL_CORE
>Obs: routine checks for safety  >Psychiatric Meds: Taper down Prolixin to 15 PO at bedtime as patient received [place], [place] Eskal[place] CR added back and titrated to 1350mg PO at bedtime, with Abilify titrated to 15mg PO at bedtime and add Melatonin 5mg PO at bedtime.  Case discussed with unit chief, Dr. Zapata and patient may benefit from Prolixin Decanoate 37.5mg IM every 3-4 weeks + Aristada 1064mg IM every 6-8 weeks given on 2/23/21.  Case also discussed with pharmacist, Dr. Beckwith, who researched medications and interactions and both can be given on same day in different sites  >Social: milieu therapy  >Treatment: Group therapy, coping skills development for emotion management and mood regulation. Motivational counseling for substance abuse related issues.   >Continue to provide therapeutic interventions in support of treatment goals.  >Dispo: accepted to inpatient rehab with discharge date 3/5/21, sent to rehab, but immediately readmitted to psych at [facility]- transferred back to [facility] for transfer to [facility], likely Sandro BALTAZAR.

## 2021-03-17 NOTE — BH INPATIENT PSYCHIATRY PROGRESS NOTE - OTHER
pt narrative and MSE avoidant themes and victimization themes Does have element of pseudocompliance stating what he thinks interviewer would like to hear, ongoing but attenuated.  Answers some questions, then changes subject frequently when asked difficult questions that he wishes to avoid, eg, re compliance.

## 2021-03-17 NOTE — BH INPATIENT PSYCHIATRY ASSESSMENT NOTE - VIOLENCE RISK FACTORS:
Antisocial behavior/cognition (past or present)/Violent ideation/threat/speech/Substance abuse/Impulsivity/Lack of insight into violence risk/need for treatment/Noncompliance with treatment/Community stressors that increase the risk of destabilization/History of violation of a legal mandate (e.g., parole, probation, AOT)

## 2021-03-17 NOTE — PSYCHIATRIC REHAB INITIAL EVALUATION - NSBHALCSUBCHOICE_PSY_ALL_CORE
Patient has history of crack/cocaine, alcohol, heroin, PCP, cannabis use however reported not using in several months.

## 2021-03-17 NOTE — BH INPATIENT PSYCHIATRY ASSESSMENT NOTE - MSE UNSTRUCTURED FT
xyz MSE on arrival in PM:  Appearance/behavior: adult male in no acute distress; irritable but superficially cooperative  Eye contact: good  Speech: normal rate, tone, volume, and prosody; no impairment in articulation  Motor: no psychomotor agitation/retardation; no rigidity or posturing; no other abnormal movements  Mood: "fine"  Affect: restricted, irritable  Thought process: linear, at times illogical, and goal-directed  Thought content: no noted SI, HI; no evidence of delusions; no internal preoccupation or response to internal stimuli  Insight: limited  Judgment: impaired  Fund of knowledge: average  Cognition/sensorium: alert and oriented x4 (person, place, time, and situation but not reason for admit to [facility]); attention adequate for sustained conversation; recent/remote memory grossly intact  Reliability: poor

## 2021-03-17 NOTE — BH INPATIENT PSYCHIATRY ASSESSMENT NOTE - CURRENT MEDICATION
MEDICATIONS  (STANDING):  benztropine 2 milliGRAM(s) Oral at bedtime  lithium CR (ESKALITH-CR) 900 milliGRAM(s) Oral at bedtime    MEDICATIONS  (PRN):  chlorproMAZINE    Injectable 100 milliGRAM(s) IntraMuscular every 4 hours PRN severe agitation or aggression  chlorproMAZINE    Tablet 50 milliGRAM(s) Oral every 4 hours PRN anxiety or mild agitation  chlorproMAZINE    Tablet 100 milliGRAM(s) Oral every 4 hours PRN severe agitation or aggression  diphenhydrAMINE   Injectable 50 milliGRAM(s) IntraMuscular once PRN agitation  gabapentin 400 milliGRAM(s) Oral four times a day PRN anxiety  haloperidol    Injectable 5 milliGRAM(s) IntraMuscular once PRN agitation  LORazepam     Tablet 2 milliGRAM(s) Oral every 6 hours PRN anxiety or agitation  LORazepam   Injectable 3 milliGRAM(s) IntraMuscular once PRN agitation

## 2021-03-17 NOTE — BH INPATIENT PSYCHIATRY ASSESSMENT NOTE - RISK ASSESSMENT
High- acute symptoms of HI and violent behavior. patient does have chronic substance abuse history and does have h/o aggressive behavior which increase in chronic risk.

## 2021-03-17 NOTE — BH INPATIENT PSYCHIATRY ASSESSMENT NOTE - NSTXMEDICINTERMD_PSY_ALL_CORE
Failed rehab, admitted to [facility] and transferred to [facility] for application to State

## 2021-03-17 NOTE — BH INPATIENT PSYCHIATRY ASSESSMENT NOTE - NS ED BHA HOMICIDALITY PRESENT AGGRESSION PROPERTY LIFETIME
Patient was given Rhogam injection by Pascal Lesch, LPN administered in left gluteus per MD.  Lot: Todd Pod Date: 5/12/19, : Andria Sarmiento. Patient was observed for 15 minutes after receiving the injection. Patient denies any adverse reactions, and left office in stable condition.
Yes
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
## 2021-03-17 NOTE — BH INPATIENT PSYCHIATRY ASSESSMENT NOTE - NSTXCONDUCDATETRGT_PSY_ALL_CORE
CARDIOVASCULAR - ADULT     • Maintains optimal cardiac output and hemodynamic stability Progressing     • Absence of cardiac arrhythmias or at baseline Progressing           Patient/Family Goals     • Patient/Family Long Term Goal Progressing     • Patient 07-Apr-2021
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
## 2021-03-17 NOTE — BH INPATIENT PSYCHIATRY ASSESSMENT NOTE - NSBHMETABOLIC_PSY_ALL_CORE_FT
BMI: BMI (kg/m2): 23.3 (03-05-21 @ 07:57)  HbA1c: A1C with Estimated Average Glucose Result: 6.0 % (02-09-21 @ 10:10)    Glucose:   BP: --  Lipid Panel: Date/Time: 02-09-21 @ 10:10  Cholesterol, Serum: 264  Direct LDL: --  HDL Cholesterol, Serum: 84  Total Cholesterol/HDL Ration Measurement: --  Triglycerides, Serum: 130

## 2021-03-17 NOTE — BH INPATIENT PSYCHIATRY PROGRESS NOTE - NSBHFUPINTERVALHXFT_PSY_A_CORE
Previously- recent discharge to rehab:  Pt compliant with medication and tolerating it well.  No events reported overnight.  Pt continues to be happy and motivated to go to inpatient rehab at the end of the week with goal of DC today. Pt brings up his goals of completing rehab, staying sober and eventually starting a family.  Pt acknowledges that if he takes the appropriate actions, and keeps in contact with his ACT team, eventually he can be off of ACT and AOT.  Pt calm and cooperative on the unit with dual RENO medication strategy.  Pt denies SIIP or HIIP and adequately behaviorally controlled.  Pt reports sleeping well. Ready for rehab. Thanks MD for his help. Says he will call to update MD how things go in rehab. Explained RENO schedule.    WED pt returned to unit after transfer from [facility]. Failed rehab as not compliant, and now says "I don't want rehab... I am done with that." Denies substance use problem, says occasional cannabis use, crack use once in his life. Shows no insight, prone to victimization themes and feels that ACT team has set him up for failure, yet has not been compliant with ACT team care, often not findable by ACT team, not compliant with medications, so now on dual RENO regimen. Denies he has any issues, says his payee is not paying him his monies, and he wants writer to help him call payee and obtain money now or by discharge. Also asks writer to help him with calling his mother for unclear reasons. Vague and limited historian, prone to lying frequently and ASPD tendencies [pt known to writer from recent admit, and ACT team known to writer from extensive phone conferences at that time].  No new SEs reported or observed.

## 2021-03-17 NOTE — BH SOCIAL WORK INITIAL PSYCHOSOCIAL EVALUATION - NSBHHOUSECOMMENTFT_PSY_ALL_CORE
Patient was homeless and after last discharge was sent to [facility] however patient became paranoid and hostile and was transferred back to [facility]

## 2021-03-17 NOTE — BH INPATIENT PSYCHIATRY ASSESSMENT NOTE - NSBHCONSBHPROVDETAILS_PSY_A_CORE  FT
Spoke with Dr. Matthew [phone] from patient's [facility] ACT team, most recently on 3/2/21 with full tx team on conference call.

## 2021-03-17 NOTE — PSYCHIATRIC REHAB INITIAL EVALUATION - NSBHPRRECOMMEND_PSY_ALL_CORE
Psychiatric rehabilitation staff and  approached patient to orient him to psychiatric rehabilitation staff and services. Patient was receptive to psychiatric rehabilitation staff engagement and was able to collaborate with psychiatric rehabilitation staff and choose a psychiatric rehabilitation goal.  Patient was observed as verbal and cooperative during assessment.  Patient was observed with reportedly "frustrated" mood and was observed with a constricted affect during initial assessment. Patient reported he was brought in from [facility] due to agitation in the context of being told [facility] would not be able to find housing for him.  Patient was observed with poor and appearance/hygiene.  Patient demonstrated fair impulse control and maintained appropriate eye contact throughout the interview. Patient spoke in a normal tone of voice and at a rapid rate.  Patient’s thought process is assessed as linear, perseverative at times.  Patient is assessed with fair insight and judgement.
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
## 2021-03-17 NOTE — BH INPATIENT PSYCHIATRY ASSESSMENT NOTE - OTHER PAST PSYCHIATRIC HISTORY (INCLUDE DETAILS REGARDING ONSET, COURSE OF ILLNESS, INPATIENT/OUTPATIENT TREATMENT)
h/o over 40 inpatient psychiatric hospitalizations for schizophrenia.  Course is complicated by substance use. Currently followed by [facility] ACT team. Last discharged from hospital one week ago where he was admitted for psychotic sx's. h/o over 40 inpatient psychiatric hospitalizations for schizophrenia.  Course is complicated by substance use. Currently followed by [facility] ACT team. Last discharged from hospital one week ago where he was admitted for psychotic sx's.    HPI:  Per H&P/initial assessment at [facility] psych unit 2S:  "Patient evaluated on 1-Doty and was paranoid, hostile. Upon initial evaluation with this MD, he refused to speak about substance use, however did víctor cocaine use as late as 01/2021. Denies further exdploration of use, and grew more agitated with ongoing examination.    Throughout the duration of his staf, he grew more paranoid, agitated, and disorganized. He expressed both a desire to leave 1-Doty but also that his treatment team would not help him. He also refused any aftercare arrangements, or discussions with current ACT team. By mid-day on 3/8/21, patient was shouting and pacing the hallways in an agitated state. He denies AH, SI/HI, but was quite paranoid with ongoing evaluations and due to ongoing decompensated psychosis, patient was admitted to 2S."    Hospital course at [facility] was significant for continuation of prior medication, including Abilify Aristada (given 2/23, dose 1064 mg q6w next due 4/6), and addition of Prolixin, initially PO to later Prolixin dec on 3/16. Patient was subsequently transferred to [facility] for further care. Of note, patient did have some behavioral issues while at [facility] including that described above.    On initial assessment, patient found sleeping in bed though is easily rousable. Patient is irritated about being awakened though cooperates superficially with interview. States he was transferred to [facility] "from rehab because they made up some stuff about what I was doing". Patient declines to elaborate, stating "nothing happened." Reports that "I lied to get into rehab so they could get me a place to stay" but after discovering limited and (per pt report) unpalatable aftercare arrangements, patient decided he no longer wanted to remain inpatient. Here, patient states he is feeling fine but is willing to stay hospitalized as it is "a place to stay". Patient denies depressed or elevated mood, depressive or hypo/manic symptoms, AVH, SI/HI. Additionally, patient notes prior substance use but denies recent use of all substances when asked today on interview, stating last cocaine use was January and last cannabis use was October.

## 2021-03-18 RX ORDER — CHLORPROMAZINE HCL 10 MG
100 TABLET ORAL EVERY 4 HOURS
Refills: 0 | Status: DISCONTINUED | OUTPATIENT
Start: 2021-03-18 | End: 2021-04-06

## 2021-03-18 RX ORDER — LITHIUM CARBONATE 300 MG/1
900 TABLET, EXTENDED RELEASE ORAL AT BEDTIME
Refills: 0 | Status: DISCONTINUED | OUTPATIENT
Start: 2021-03-18 | End: 2021-04-06

## 2021-03-18 RX ORDER — CHLORPROMAZINE HCL 10 MG
50 TABLET ORAL EVERY 4 HOURS
Refills: 0 | Status: DISCONTINUED | OUTPATIENT
Start: 2021-03-18 | End: 2021-04-06

## 2021-03-18 RX ORDER — GABAPENTIN 400 MG/1
400 CAPSULE ORAL
Refills: 0 | Status: DISCONTINUED | OUTPATIENT
Start: 2021-03-18 | End: 2021-04-06

## 2021-03-18 RX ADMIN — LITHIUM CARBONATE 900 MILLIGRAM(S): 300 TABLET, EXTENDED RELEASE ORAL at 21:06

## 2021-03-18 NOTE — CHART NOTE - NSCHARTNOTEFT_GEN_A_CORE
Screening Medical Evaluation  Patient Admitted from: [facility] admitting diagnosis: Schizoaffective disorder    PAST MEDICAL & SURGICAL HISTORY:  No pertinent past medical history    No significant past surgical history          Allergies    No Known Allergies    Intolerances        Social History:     FAMILY HISTORY:  No pertinent family history in first degree relatives        MEDICATIONS  (STANDING):  benztropine 2 milliGRAM(s) Oral at bedtime  lithium CR (ESKALITH-CR) 1350 milliGRAM(s) Oral at bedtime    MEDICATIONS  (PRN):  diphenhydrAMINE   Injectable 50 milliGRAM(s) IntraMuscular once PRN agitation  haloperidol    Injectable 5 milliGRAM(s) IntraMuscular once PRN agitation  hydrOXYzine hydrochloride 50 milliGRAM(s) Oral every 6 hours PRN anxiety or agitation  LORazepam   Injectable 2 milliGRAM(s) IntraMuscular once PRN agitation      Vital Signs Last 24 Hrs  T(C): 36.3 (17 Mar 2021 19:10), Max: 36.3 (17 Mar 2021 19:10)  T(F): 97.3 (17 Mar 2021 19:10), Max: 97.3 (17 Mar 2021 19:10)  HR: 69 (17 Mar 2021 07:25) (69 - 69)  BP: 103/61 (17 Mar 2021 07:25) (103/61 - 103/61)  BP(mean): --  RR: --  SpO2: --  CAPILLARY BLOOD GLUCOSE            PHYSICAL EXAM:  GENERAL: NAD, well-developed  HEAD:  Atraumatic, Normocephalic  EYES: EOMI, PERRLA, conjunctiva and sclera clear  NECK: Supple, No JVD  CHEST/LUNG: Clear to auscultation bilaterally; No wheeze  HEART: Regular rate and rhythm; No murmurs, rubs, or gallops  ABDOMEN: Soft, Nontender, Nondistended; Bowel sounds present  EXTREMITIES:  2+ Peripheral Pulses, No clubbing, cyanosis, or edema  PSYCH: AAOx3  NEUROLOGY: non-focal  SKIN: No rashes or lesions    LABS:                    RADIOLOGY & ADDITIONAL TESTS:    Assessment and Plan:  46 yo Male with no significant PMH is admitted to [facility] with a primary psychiatric diagnosis of Schizoaffective disorder. The pt currently denies having any medical complaints such as chest pain, sob, abdominal pain, n/v/d/c, or any problems with urination or bowel movements. The rest of his screening physical is unremarkable.    1.Schizoaffective disorder-Plan: continue with meds as per primary psychiatric team

## 2021-03-18 NOTE — BH INPATIENT PSYCHIATRY PROGRESS NOTE - NSBHASSESSSUMMFT_PSY_ALL_CORE
>Obs: routine checks for safety  >Psychiatric Meds: Taper down Prolixin to 15 PO at bedtime as patient received RENO, [place] Eskalith CR added back and titrated to 1350mg PO at bedtime, with Abilify titrated to 15mg PO at bedtime and add Melatonin 5mg PO at bedtime.  Case discussed with unit chief, Dr. Zapata and patient may benefit from Prolixin Decanoate 37.5mg IM every 3-4 weeks + Aristada 1064mg IM every 6-8 weeks given on 2/23/21.  Case also discussed with pharmacist, Dr. Beckwith, who researched medications and interactions and both can be given on same day in different sites  >Social: milieu therapy  >Treatment: Group therapy, coping skills development for emotion management and mood regulation. Motivational counseling for substance abuse related issues.   >Continue to provide therapeutic interventions in support of treatment goals.  >Dispo: accepted to inpatient rehab with discharge date 3/5/21, sent to rehab, but immediately readmitted to psych at [facility]- transferred back to [facility] for transfer to [facility], likely [facility].

## 2021-03-18 NOTE — BH INPATIENT PSYCHIATRY PROGRESS NOTE - NSTXDCHOUSGOALOTHER_PSY_ALL_CORE
Pt has agreed to i/p rehab and is projected to d/c today, 3/5/21. Housing issues will be address during d/c planning from rehab, pt has active SPA ans is on the waitlist.
Pt has agreed to i/p rehab and is projected to d/c today, 3/5/21. Housing issues will be address during d/c planning from rehab, pt has active SPA ans is on the waitlist.

## 2021-03-18 NOTE — BH INPATIENT PSYCHIATRY PROGRESS NOTE - OTHER
coarse tremor and mild akathesia Does have element of pseudocompliance stating what he thinks interviewer would like to hear, ongoing but attenuated.  Answers some questions, then changes subject frequently when asked difficult questions that he wishes to avoid, eg, re compliance, substances, rehab, failing tx with his ACT team.  INTRUSIVE avoidant themes and victimization themes pt narrative and MSE

## 2021-03-18 NOTE — BH INPATIENT PSYCHIATRY PROGRESS NOTE - NSBHFUPINTERVALHXFT_PSY_A_CORE
Previously- recent discharge to rehab:  Pt compliant with medication and tolerating it well.  No events reported overnight.  Pt continues to be happy and motivated to go to inpatient rehab at the end of the week with goal of DC today. Pt brings up his goals of completing rehab, staying sober and eventually starting a family.  Pt acknowledges that if he takes the appropriate actions, and keeps in contact with his ACT team, eventually he can be off of ACT and AOT.  Pt calm and cooperative on the unit with dual RENO medication strategy.  Pt denies SIIP or HIIP and adequately behaviorally controlled.  Pt reports sleeping well. Ready for rehab. Thanks MD for his help. Says he will call to update MD how things go in rehab. Explained RENO schedule.    WED pt returned to unit after transfer from [facility]. Failed rehab as not compliant, and now says "I don't want rehab... I am done with that." Denies substance use problem, says occasional cannabis use, crack use once in his life. Shows no insight, prone to victimization themes and feels that ACT team has set him up for failure, yet has not been compliant with ACT team care, often not findable by ACT team, not compliant with medications, so now on dual RENO regimen. Denies he has any issues, says his payee is not paying him his monies, and he wants writer to help him call payee and obtain money now or by discharge. Also asks writer to help him with calling his mother for unclear reasons. Vague and limited historian, prone to lying frequently and ASPD tendencies [pt known to writer from recent admit, and ACT team known to writer from extensive phone conferences at that time].  THURS No appreciable change in status. Very intrusive with MD, asks for ativan. Writer suggested nonaddictive alternative. Later asks MD to call his payee to obtain his monies and changed his mind that he does not wish MD and JULIO to help him call his mother. Writer suggested FRI call with MD and JULIO to payee to clarify money situation. Says he has money to pay for rent, but does not explain why he has not done so and remained homeless. Says he wants to relocate to [place], has no friends in [facility] area other than those who do drugs here. Yet, does not want rehab.  Overall, very prone to reporting what he believes MD would like to hear. Does not recollect any conversations with this MD on this unit last month [place] if compliance with rehab fails.	  No new SEs reported or observed but does appear to have course tremor and mild akathesia.

## 2021-03-19 PROCEDURE — 99232 SBSQ HOSP IP/OBS MODERATE 35: CPT

## 2021-03-19 RX ORDER — FLUPHENAZINE HYDROCHLORIDE 1 MG/1
37.5 TABLET, FILM COATED ORAL ONCE
Refills: 0 | Status: DISCONTINUED | OUTPATIENT
Start: 2021-03-19 | End: 2021-03-19

## 2021-03-19 RX ADMIN — LITHIUM CARBONATE 900 MILLIGRAM(S): 300 TABLET, EXTENDED RELEASE ORAL at 20:42

## 2021-03-19 RX ADMIN — Medication 2 MILLIGRAM(S): at 20:42

## 2021-03-20 PROCEDURE — 99231 SBSQ HOSP IP/OBS SF/LOW 25: CPT

## 2021-03-20 RX ADMIN — Medication 2 MILLIGRAM(S): at 20:30

## 2021-03-20 RX ADMIN — LITHIUM CARBONATE 900 MILLIGRAM(S): 300 TABLET, EXTENDED RELEASE ORAL at 20:30

## 2021-03-20 NOTE — BH INPATIENT PSYCHIATRY PROGRESS NOTE - NSBHASSESSSUMMFT_PSY_ALL_CORE
>Obs: routine checks for safety  >Psychiatric Meds: Taper down Prolixin to 15 PO at bedtime as patient received RENO, [place] Eskalith CR added back and titrated to 1350mg PO at bedtime, with Abilify titrated to 15mg PO at bedtime and add Melatonin 5mg PO at bedtime.  Case discussed with unit chief, Dr. Zapata and patient may benefit from Prolixin Decanoate 37.5mg IM every 3-4 weeks + Aristada 1064mg IM every 6-8 weeks given on 2/23/21.  Case also discussed with pharmacist, Dr. Beckwith, who researched medications and interactions and both can be given on same day in different sites  >Social: milieu therapy  >Treatment: Group therapy, coping skills development for emotion management and mood regulation. Motivational counseling for substance abuse related issues.   >Continue to provide therapeutic interventions in support of treatment goals.  >Dispo: accepted to inpatient rehab with discharge date 3/5/21, sent to rehab, but immediately readmitted to psych at [facility]- transferred back to [facility] for transfer to [facility], likely [facility].      Continue plan above as per primary treatment team.

## 2021-03-20 NOTE — BH INPATIENT PSYCHIATRY PROGRESS NOTE - NSBHFUPINTERVALHXFT_PSY_A_CORE
Pt seen, chart reviewed, discussed with nursing staff. As per nursing staff, pt has been in improved behavioral control though still a bit pressured.    On interview, pt says that he's "good." He describes taking naps throughout the day but slept well last night. He denies any problems with his thoughts or his mood. No problems with the meds. No SI.

## 2021-03-21 PROCEDURE — 99231 SBSQ HOSP IP/OBS SF/LOW 25: CPT

## 2021-03-21 RX ADMIN — LITHIUM CARBONATE 900 MILLIGRAM(S): 300 TABLET, EXTENDED RELEASE ORAL at 20:26

## 2021-03-21 RX ADMIN — Medication 2 MILLIGRAM(S): at 20:26

## 2021-03-21 NOTE — BH INPATIENT PSYCHIATRY PROGRESS NOTE - NSBHFUPINTERVALCCFT_PSY_A_CORE
Pt seen f/u for mood and psychosis and noncompliance with substance use disorders >>>>>DATE of this NOTE entered in ERROR: Actual date of Progress NOTE is 3/19/21 FRI<<<<<    Pt seen f/u for mood and psychosis and noncompliance with substance use disorders

## 2021-03-21 NOTE — BH INPATIENT PSYCHIATRY PROGRESS NOTE - NSBHFUPINTERVALHXFT_PSY_A_CORE
Pt seen, chart reviewed, discussed with nursing staff.   On interview, pt says that he's "good." He's been sleeping on and off all day because he says that there's nothing else to do. He ate lunch and says that he slept well last night. No problems with the meds. No SI.

## 2021-03-21 NOTE — BH INPATIENT PSYCHIATRY PROGRESS NOTE - NSBHASSESSSUMMFT_PSY_ALL_CORE
>Obs: routine checks for safety  >Psychiatric Meds: Taper down Prolixin to 15 PO at bedtime as patient received REON, [place] Eskalith CR added back and titrated to 1350mg PO at bedtime, with Abilify titrated to 15mg PO at bedtime and add Melatonin 5mg PO at bedtime.  Case discussed with unit chief, Dr. Zapata and patient may benefit from Prolixin Decanoate 37.5mg IM every 3-4 weeks + Aristada 1064mg IM every 6-8 weeks given on 2/23/21.  Case also discussed with pharmacist, Dr. Beckwith, who researched medications and interactions and both can be given on same day in different sites  >Social: milieu therapy  >Treatment: Group therapy, coping skills development for emotion management and mood regulation. Motivational counseling for substance abuse related issues.   >Continue to provide therapeutic interventions in support of treatment goals.  >Dispo: accepted to inpatient rehab with discharge date 3/5/21, sent to rehab, but immediately readmitted to psych at [facility]- transferred back to [facility] for transfer to [facility], likely [facility].      Continue plan above as per primary treatment team.

## 2021-03-21 NOTE — BH INPATIENT PSYCHIATRY PROGRESS NOTE - NSBHFUPINTERVALHXFT_PSY_A_CORE
Previously- recent discharge to rehab:  Pt compliant with medication and tolerating it well.  No events reported overnight.  Pt continues to be happy and motivated to go to inpatient rehab at the end of the week with goal of DC today. Pt brings up his goals of completing rehab, staying sober and eventually starting a family.  Pt acknowledges that if he takes the appropriate actions, and keeps in contact with his ACT team, eventually he can be off of ACT and AOT.  Pt calm and cooperative on the unit with dual RENO medication strategy.  Pt denies SIIP or HIIP and adequately behaviorally controlled.  Pt reports sleeping well. Ready for rehab. Thanks MD for his help. Says he will call to update MD how things go in rehab. Explained RENO schedule.    WED pt returned to unit after transfer from [facility]. Failed rehab as not compliant, and now says "I don't want rehab... I am done with that." Denies substance use problem, says occasional cannabis use, crack use once in his life. Shows no insight, prone to victimization themes and feels that ACT team has set him up for failure, yet has not been compliant with ACT team care, often not findable by ACT team, not compliant with medications, so now on dual RENO regimen. Denies he has any issues, says his payee is not paying him his monies, and he wants writer to help him call payee and obtain money now or by discharge. Also asks writer to help him with calling his mother for unclear reasons. Vague and limited historian, prone to lying frequently and ASPD tendencies [pt known to writer from recent admit, and ACT team known to writer from extensive phone conferences at that time].    FRI No appreciable change in status. Very intrusive with MD, asks for ativan now less often but instead "a brown pill for relaxation". We made plan to call his payee today  and JULIO and MD did so, with finances/rules again explained to pt. Writer suggested FRI call with MD and JULIO also to mother, but pt refused. Says he has money to pay for rent, but does not explain why he has not done so and remained homeless instead. Says he wants to relocate to [place], has no friends in [facility] area other than those who do drugs here but today says he does not wish  to relocate to [place].  Overall, very prone to reporting what he believes MD would like to hear. Does not recollect any conversations with this MD on this unit last month [place] if compliance with rehab fails.  Explained team will call ACT team next week.  No new SEs reported or observed but does appear to have course tremor and mild akathesia.

## 2021-03-21 NOTE — BH INPATIENT PSYCHIATRY PROGRESS NOTE - NSBHASSESSSUMMFT_PSY_ALL_CORE
>Obs: routine checks for safety  >Psychiatric Meds: Taper down Prolixin to 15 PO at bedtime as patient received RENO, [place] Eskalith CR added back and titrated to 1350mg PO at bedtime, with Abilify titrated to 15mg PO at bedtime and add Melatonin 5mg PO at bedtime.  Case discussed with unit chief, Dr. Zapata and patient may benefit from Prolixin Decanoate 37.5mg IM every 3-4 weeks + Aristada 1064mg IM every 6-8 weeks given on 2/23/21.  Case also discussed with pharmacist, Dr. Beckwith, who researched medications and interactions and both can be given on same day in different sites  >Social: milieu therapy  >Treatment: Group therapy, coping skills development for emotion management and mood regulation. Motivational counseling for substance abuse related issues.   >Continue to provide therapeutic interventions in support of treatment goals.  >Dispo: accepted to inpatient rehab with discharge date 3/5/21, sent to rehab, but immediately readmitted to psych at [facility]- transferred back to [facility] for transfer to [facility], likely [facility].

## 2021-03-22 PROCEDURE — 99232 SBSQ HOSP IP/OBS MODERATE 35: CPT

## 2021-03-22 RX ORDER — FLUPHENAZINE HYDROCHLORIDE 1 MG/1
37.5 TABLET, FILM COATED ORAL ONCE
Refills: 0 | Status: COMPLETED | OUTPATIENT
Start: 2021-04-06 | End: 2021-04-06

## 2021-03-22 RX ADMIN — LITHIUM CARBONATE 900 MILLIGRAM(S): 300 TABLET, EXTENDED RELEASE ORAL at 20:26

## 2021-03-22 RX ADMIN — Medication 2 MILLIGRAM(S): at 20:25

## 2021-03-22 NOTE — BH INPATIENT PSYCHIATRY PROGRESS NOTE - OTHER
coarse tremor and mild akathesia pt narrative and MSE avoidant themes and victimization themes Evasive, notably re noncompliance and substance issues- pt denies substances are main issue, endorses 1x cocaine use.

## 2021-03-22 NOTE — BH INPATIENT PSYCHIATRY PROGRESS NOTE - NSBHFUPINTERVALHXFT_PSY_A_CORE
Previously- recent discharge to rehab:  Pt compliant with medication and tolerating it well.  No events reported overnight.  Pt continues to be happy and motivated to go to inpatient rehab at the end of the week with goal of DC today. Pt brings up his goals of completing rehab, staying sober and eventually starting a family.  Pt acknowledges that if he takes the appropriate actions, and keeps in contact with his ACT team, eventually he can be off of ACT and AOT.  Pt calm and cooperative on the unit with dual RENO medication strategy.  Pt denies SIIP or HIIP and adequately behaviorally controlled.  Pt reports sleeping well. Ready for rehab. Thanks MD for his help. Says he will call to update MD how things go in rehab. Explained RENO schedule.    WED pt returned to unit after transfer from [facility]. Failed rehab as not compliant, and now says "I don't want rehab... I am done with that." Denies substance use problem, says occasional cannabis use, crack use once in his life. Shows no insight, prone to victimization themes and feels that ACT team has set him up for failure, yet has not been compliant with ACT team care, often not findable by ACT team, not compliant with medications, so now on dual RENO regimen. Denies he has any issues, says his payee is not paying him his monies, and he wants writer to help him call payee and obtain money now or by discharge. Also asks writer to help him with calling his mother for unclear reasons. Vague and limited historian, prone to lying frequently and ASPD tendencies [pt known to writer from recent admit, and ACT team known to writer from extensive phone conferences at that time].    MON No appreciable change in status. Very intrusive with MD, asks for ativan now less often but instead "a brown pill for relaxation". We made plan to call his payee FRI and JULIO and MD did so, with finances/rules again explained to pt. Writer suggested FRI call with MD and JULIO also to mother, but pt refused. Says he has money to pay for rent, but does not explain why he has not done so and remained homeless instead. Says he wants to relocate to [place], has no friends in [facility] area other than those who do drugs here but today says he does not wish to relocate to [place]. Appears ambivalent about all aspects of his care except discharge.  Overall, very prone to reporting what he believes MD would like to hear. Does not recollect any conversations with this MD on this unit last month about [place] if compliance with rehab fails.  Explained team will call ACT team tomorrow and pt is to ask his questions directly, as he complains ACT team is not forthcoming. Prominent victimization themes.  No new SEs reported or observed but does appear to have course tremor and mild akathesia, attenuated.

## 2021-03-22 NOTE — BH INPATIENT PSYCHIATRY PROGRESS NOTE - NSBHASSESSSUMMFT_PSY_ALL_CORE
>Obs: routine checks for safety  >Psychiatric Meds: Taper down Prolixin to 15 PO at bedtime as patient received RENO, [place] Eskalith CR added back and titrated to 1350mg PO at bedtime, with Abilify titrated to 15mg PO at bedtime and add Melatonin 5mg PO at bedtime.  Case discussed with unit chief, Dr. Zapata and patient may benefit from Prolixin Decanoate 37.5mg IM every 3-4 weeks + Aristada 1064mg IM every 6-8 weeks given on 2/23/21.  Case also discussed with pharmacist, Dr. Beckwith, who researched medications and interactions and both can be given on same day in different sites  >Social: milieu therapy  >Treatment: Group therapy, coping skills development for emotion management and mood regulation. Motivational counseling for substance abuse related issues.   >Continue to provide therapeutic interventions in support of treatment goals.  >Dispo: accepted to inpatient rehab with discharge date 3/5/21, sent to rehab, but immediately readmitted to psych at [facility]- transferred back to [facility] for transfer to [facility], likely [facility].

## 2021-03-22 NOTE — BH INPATIENT PSYCHIATRY PROGRESS NOTE - NSBHFUPINTERVALCCFT_PSY_A_CORE
>>>>>DATE of this NOTE entered in ERROR: Actual date of Progress NOTE is 3/19/21 FRI<<<<<    Pt seen f/u for mood and psychosis and noncompliance with substance use disorders

## 2021-03-23 RX ADMIN — Medication 2 MILLIGRAM(S): at 20:16

## 2021-03-23 RX ADMIN — LITHIUM CARBONATE 900 MILLIGRAM(S): 300 TABLET, EXTENDED RELEASE ORAL at 20:16

## 2021-03-23 RX ADMIN — Medication 50 MILLIGRAM(S): at 15:48

## 2021-03-23 RX ADMIN — Medication 2 MILLIGRAM(S): at 15:48

## 2021-03-23 NOTE — BH INPATIENT PSYCHIATRY PROGRESS NOTE - NSBHASSESSSUMMFT_PSY_ALL_CORE
>Obs: routine checks for safety  >Psychiatric Meds: Taper down Prolixin to 15 PO at bedtime as patient received RENO, [place] Eskalith CR added back and titrated to 1350mg PO at bedtime, with Abilify titrated to 15mg PO at bedtime and add Melatonin 5mg PO at bedtime.  Case discussed with unit chief, Dr. Zapata and patient may benefit from Prolixin Decanoate 37.5mg IM every 3-4 weeks + Aristada 1064mg IM every 6-8 weeks given on 2/23/21.  Case also discussed with pharmacist, Dr. Beckwith, who researched medications and interactions and both can be given on same day in different sites  >Social: milieu therapy  >Treatment: Group therapy, coping skills development for emotion management and mood regulation. Motivational counseling for substance abuse related issues.   >Continue to provide therapeutic interventions in support of treatment goals.  >Dispo: accepted to inpatient rehab with discharge date 3/5/21, sent to rehab, but immediately readmitted to psych at [facility]- transferred back to [facility] for transfer to [facility], likely [facility].

## 2021-03-23 NOTE — BH INPATIENT PSYCHIATRY PROGRESS NOTE - OTHER
avoidant themes and victimization themes Evasive, notably re noncompliance and substance issues- pt denies substances are main issue, endorses 1x cocaine use. pt narrative and MSE coarse tremor and mild akathesia

## 2021-03-23 NOTE — BH INPATIENT PSYCHIATRY PROGRESS NOTE - NSBHADMITCOUNSEL_PSY_A_CORE
diagnostic results/impressions and/or recommended studies/risks and benefits of treatment options/instructions for management, treatment and follow up/importance of adherence to chosen treatment/risk factor reduction/prognosis/other...

## 2021-03-23 NOTE — BH INPATIENT PSYCHIATRY PROGRESS NOTE - NSBHFUPINTERVALHXFT_PSY_A_CORE
Previously- recent discharge to rehab:  Pt compliant with medication and tolerating it well.  No events reported overnight.  Pt continues to be happy and motivated to go to inpatient rehab at the end of the week with goal of DC today. Pt brings up his goals of completing rehab, staying sober and eventually starting a family.  Pt acknowledges that if he takes the appropriate actions, and keeps in contact with his ACT team, eventually he can be off of ACT and AOT.  Pt calm and cooperative on the unit with dual RENO medication strategy.  Pt denies SIIP or HIIP and adequately behaviorally controlled.  Pt reports sleeping well. Ready for rehab. Thanks MD for his help. Says he will call to update MD how things go in rehab. Explained RENO schedule.    WED pt returned to unit after transfer from [facility]. Failed rehab as not compliant, and now says "I don't want rehab... I am done with that." Denies substance use problem, says occasional cannabis use, crack use once in his life. Shows no insight, prone to victimization themes and feels that ACT team has set him up for failure, yet has not been compliant with ACT team care, often not findable by ACT team, not compliant with medications, so now on dual RENO regimen. Denies he has any issues, says his payee is not paying him his monies, and he wants writer to help him call payee and obtain money now or by discharge. Also asks writer to help him with calling his mother for unclear reasons. Vague and limited historian, prone to lying frequently and ASPD tendencies [pt known to writer from recent admit, and ACT team known to writer from extensive phone conferences at that time].    TUES No appreciable change in status. Very intrusive with MD, asks for ativan now less often but instead "a brown pill for relaxation". We made plan to call his payee FRI and JULIO and MD did so, with finances/rules again explained to pt. Writer suggested FRI call with MD and JULIO also to mother, but pt refused. Says he has money to pay for rent, but does not explain why he has not done so and remained homeless instead. Says he wants to relocate to [place], has no friends in [facility] area other than those who do drugs here but today says he does not wish to relocate to [place]. Appears ambivalent about all aspects of his care except discharge.  Overall, very prone to reporting what he believes MD would like to hear. Does not recollect any conversations with this MD on this unit last month [place] if compliance with rehab fails.  Explained team will call ACT team today and pt is to ask his questions directly, as he complains ACT team is not forthcoming. Prominent victimization themes continue. Lengthy meeting with ACT team members and director, AOT representative and o/p MD and pt got up, walked out 5-10x during over one hour meeting today via phone conference. Unable to tolerate any role in his admissions or accept personal responsibility, continues to elaborate paranoid victimization themes and accuse his ACT team of lying repeatedly during phone session. Very poor frustration tolerance noted.  Says that hospitalization will make him homicidal. After meeting requires PO PRNs from staff as he has come to understand that State transfer is possible.  Dr. GARCIA feels that no demonstrated compliance and further stabilization at [facility] is required.  No new SEs reported or observed but does appear to have course tremor and mild akathesia, attenuated.

## 2021-03-23 NOTE — BH INPATIENT PSYCHIATRY PROGRESS NOTE - NSBHADMITCOORDCAREOTHER_PSY_A_CORE FT
ACT team/AOT staff/ACT team MD phone session with writer and pt. Case reviewed in much detail, re frequent admits and failure of pt with ACT team care- State hearing in AM is plan, followed by State application.

## 2021-03-24 LAB — SARS-COV-2 RNA SPEC QL NAA+PROBE: SIGNIFICANT CHANGE UP

## 2021-03-24 PROCEDURE — 99232 SBSQ HOSP IP/OBS MODERATE 35: CPT

## 2021-03-24 RX ADMIN — Medication 2 MILLIGRAM(S): at 17:38

## 2021-03-24 RX ADMIN — GABAPENTIN 400 MILLIGRAM(S): 400 CAPSULE ORAL at 10:10

## 2021-03-24 RX ADMIN — Medication 2 MILLIGRAM(S): at 09:43

## 2021-03-24 RX ADMIN — LITHIUM CARBONATE 900 MILLIGRAM(S): 300 TABLET, EXTENDED RELEASE ORAL at 20:58

## 2021-03-24 RX ADMIN — Medication 2 MILLIGRAM(S): at 20:58

## 2021-03-24 NOTE — BH INPATIENT PSYCHIATRY PROGRESS NOTE - NSBHFUPINTERVALHXFT_PSY_A_CORE
Previously- recent discharge to rehab:  Pt compliant with medication and tolerating it well.  No events reported overnight.  Pt continues to be happy and motivated to go to inpatient rehab at the end of the week with goal of DC today. Pt brings up his goals of completing rehab, staying sober and eventually starting a family.  Pt acknowledges that if he takes the appropriate actions, and keeps in contact with his ACT team, eventually he can be off of ACT and AOT.  Pt calm and cooperative on the unit with dual RENO medication strategy.  Pt denies SIIP or HIIP and adequately behaviorally controlled.  Pt reports sleeping well. Ready for rehab. Thanks MD for his help. Says he will call to update MD how things go in rehab. Explained RENO schedule.    WED pt returned to unit after transfer from [facility]. Failed rehab as not compliant, and now says "I don't want rehab... I am done with that." Denies substance use problem, says occasional cannabis use, crack use once in his life. Shows no insight, prone to victimization themes and feels that ACT team has set him up for failure, yet has not been compliant with ACT team care, often not findable by ACT team, not compliant with medications, so now on dual RENO regimen. Denies he has any issues, says his payee is not paying him his monies, and he wants writer to help him call payee and obtain money now or by discharge. Also asks writer to help him with calling his mother for unclear reasons. Vague and limited historian, prone to lying frequently and ASPD tendencies [pt known to writer from recent admit, and ACT team known to writer from extensive phone conferences at that time].    TUES No appreciable change in status. Very intrusive with MD, asks for ativan now less often but instead "a brown pill for relaxation". We made plan to call his payee FRI and JULIO and MD did so, with finances/rules again explained to pt. Writer suggested FRI call with MD and JULIO also to mother, but pt refused. Says he has money to pay for rent, but does not explain why he has not done so and remained homeless instead. Says he wants to relocate to [place], has no friends in [facility] area other than those who do drugs here but today says he does not wish to relocate to [place]. Appears ambivalent about all aspects of his care except discharge.  Overall, very prone to reporting what he believes MD would like to hear. Does not recollect any conversations with this MD on this unit last month about likely transfer to [facility] if compliance with rehab fails.  Explained team will call ACT team today and pt is to ask his questions directly, as he complains ACT team is not forthcoming. Prominent victimization themes continue. Lengthy meeting with ACT team members and director, AOT representative and o/p MD and pt got up, walked out 5-10x during over one hour meeting today via phone conference. Unable to tolerate any role in his admissions or accept personal responsibility, continues to elaborate paranoid victimization themes and accuse his ACT team of lying repeatedly during phone session. Very poor frustration tolerance noted.  Says that hospitalization will make him homicidal. After meeting requires PO PRNs from staff as he has come to understand that State transfer is possible.  Dr. GARCIA feels that no demonstrated compliance and further stabilization at [facility] is required.  Am today, State hearing accomplished with inpt director. Encouraged pt to consider State to become further stabilized and better address substance abstincence. He is willing to consider, but very argumentative and intrusive most of the day.  In PM, informed he is covid NEG as peer on unit was POS. Does not wish to transfer hospitals, he says.  No new SEs reported or observed but does appear to have course tremor and mild akathesia, attenuated.
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
## 2021-03-24 NOTE — BH INPATIENT PSYCHIATRY PROGRESS NOTE - NSBHASSESSSUMMFT_PSY_ALL_CORE
>Obs: routine checks for safety  >Psychiatric Meds: Taper down Prolixin to 15 PO at bedtime as patient received RENO, [place] Eskalith CR added back and titrated to 1350mg PO at bedtime, with Abilify titrated to 15mg PO at bedtime and add Melatonin 5mg PO at bedtime.  Case discussed with unit chief, Dr. Zapata and patient may benefit from Prolixin Decanoate 37.5mg IM every 3-4 weeks + Aristada 1064mg IM every 6-8 weeks given on 2/23/21.  Case also discussed with pharmacist, Dr. Beckwith, who researched medications and interactions and both can be given on same day in different sites  >Social: milieu therapy  >Treatment: Group therapy, coping skills development for emotion management and mood regulation. Motivational counseling for substance abuse related issues.   >Continue to provide therapeutic interventions in support of treatment goals.  >Dispo: accepted to inpatient rehab with discharge date 3/5/21, sent to rehab, but immediately readmitted to psych at [facility]- transferred back to [facility] for transfer to [facility], likely [facility].

## 2021-03-24 NOTE — BH INPATIENT PSYCHIATRY PROGRESS NOTE - OTHER
pt narrative and MSE coarse tremor and mild akathesia avoidant themes and victimization themes Evasive, notably re noncompliance and substance issues- pt denies substances are main issue, endorses 1x cocaine use.

## 2021-03-25 PROCEDURE — 99232 SBSQ HOSP IP/OBS MODERATE 35: CPT

## 2021-03-25 RX ADMIN — Medication 2 MILLIGRAM(S): at 08:26

## 2021-03-25 RX ADMIN — Medication 2 MILLIGRAM(S): at 20:09

## 2021-03-25 RX ADMIN — Medication 2 MILLIGRAM(S): at 18:07

## 2021-03-25 RX ADMIN — LITHIUM CARBONATE 900 MILLIGRAM(S): 300 TABLET, EXTENDED RELEASE ORAL at 20:09

## 2021-03-25 NOTE — BH INPATIENT PSYCHIATRY PROGRESS NOTE - OTHER
coarse tremor and mild akathesia Does have element of pseudocompliance stating what he thinks interviewer would like to hear, ongoing but attenuated.  Answers some questions, then changes subject frequently when asked difficult questions that he wishes to avoid, eg, re compliance, substances, rehab, failing tx with his ACT team.  INTRUSIVE throughout the day, walks off from interview daily. Then constantly demands time from MD to discuss his issues later in the day. pt narrative and MSE avoidant themes and victimization themes Evasive, notably re noncompliance and substance issues- pt denies substances are main issue, endorses 1x cocaine use.

## 2021-03-25 NOTE — BH INPATIENT PSYCHIATRY PROGRESS NOTE - NSBHFUPINTERVALHXFT_PSY_A_CORE
Previously- recent discharge to rehab:  Pt compliant with medication and tolerating it well.  No events reported overnight.  Pt continues to be happy and motivated to go to inpatient rehab at the end of the week with goal of DC today. Pt brings up his goals of completing rehab, staying sober and eventually starting a family.  Pt acknowledges that if he takes the appropriate actions, and keeps in contact with his ACT team, eventually he can be off of ACT and AOT.  Pt calm and cooperative on the unit with dual RENO medication strategy.  Pt denies SIIP or HIIP and adequately behaviorally controlled.  Pt reports sleeping well. Ready for rehab. Thanks MD for his help. Says he will call to update MD how things go in rehab. Explained RENO schedule.    WED pt returned to unit after transfer from [facility]. Failed rehab as not compliant, and now says "I don't want rehab... I am done with that." Denies substance use problem, says occasional cannabis use, crack use once in his life. Shows no insight, prone to victimization themes and feels that ACT team has set him up for failure, yet has not been compliant with ACT team care, often not findable by ACT team, not compliant with medications, so now on dual RENO regimen. Denies he has any issues, says his payee is not paying him his monies, and he wants writer to help him call payee and obtain money now or by discharge. Also asks writer to help him with calling his mother for unclear reasons. Vague and limited historian, prone to lying frequently and ASPD tendencies [pt known to writer from recent admit, and ACT team known to writer from extensive phone conferences at that time].    THURS No appreciable change in status. Very intrusive with MD, asks for ativan now less often but instead "a brown pill for relaxation". We made plan to call his payee FRI and JULIO and MD did so, with finances/rules again explained to pt. Writer suggested FRI call with MD and JULIO also to mother, but pt refused. Says he has money to pay for rent, but does not explain why he has not done so and remained homeless instead. Says he wants to relocate to [place], has no friends in [facility] area other than those who do drugs here but today says he does not wish to relocate to [place]. Appears ambivalent about all aspects of his care except discharge.  Overall, very prone to reporting what he believes MD would like to hear. Does not recollect any conversations with this MD on this unit last month about likely transfer to [facility] if compliance with rehab fails. Called ACT team TUBALDOMERO and pt asked his questions directly, as he complains ACT team is not forthcoming. Prominent victimization themes continued. Lengthy meeting with ACT team members and director, AOT representative and o/p MD and pt got up, walked out 5-10x during over one hour meeting today via phone conference. Unable to tolerate any role in his admissions or accept personal responsibility, continues to elaborate paranoid victimization themes and accuse his ACT team of lying repeatedly during phone session. Very poor frustration tolerance noted.  Says that hospitalization will make him homicidal. After meeting requires PO PRNs from staff as he has come to understand that State transfer is possible.  Dr. GARCIA feels that no demonstrated compliance and further stabilization at [facility] is required.  Am WED, State hearing accomplished with inpt director. Encouraged pt to consider State to become further stabilized and better address substance abstinence. He is willing to consider, but very argumentative- storms off from interview with inpatient director for State hearing.  Remains intrusive most of the day which continues today- asked pt to be mindful of interfering in care of other patients, which he is doing daily.  In PM WED, informed he is covid NEG as peer on unit was POS. Does not wish to transfer hospitals, he says. Will remain on unit, for now.  Explained this again today to pt, will retest at day 8, but pt remains very perseverative on discharge, with no insight, paranoid themes continue.  No new SEs reported or observed but does appear to have course tremor and mild akathesia, both attenuated.

## 2021-03-25 NOTE — BH INPATIENT PSYCHIATRY PROGRESS NOTE - NSBHASSESSSUMMFT_PSY_ALL_CORE
>Obs: routine checks for safety  >Psychiatric Meds: Taper down Prolixin to 15 PO at bedtime as patient received RENO, [place] Eskalith CR added back and titrated to 1350mg PO at bedtime, with Abilify titrated to 15mg PO at bedtime and add Melatonin 5mg PO at bedtime.  Case discussed with unit chief, Dr. Zapata and patient may benefit from Prolixin Decanoate 37.5mg IM every 3-4 weeks + Aristada 1064mg IM every 6-8 weeks given on 2/23/21.  Case also discussed with pharmacist, Dr. Beckwith, who researched medications and interactions and both can be given on same day in different sites  >Social: milieu therapy  >Treatment: Group therapy, coping skills development for emotion management and mood regulation. Motivational counseling for substance abuse related issues.   >Continue to provide therapeutic interventions in support of treatment goals.  >Dispo: accepted to inpatient rehab with discharge date 3/5/21, sent to rehab, but immediately readmitted to psych at [facility]- transferred back to [facility] for transfer to [facility], likely [facility].

## 2021-03-26 PROCEDURE — 99232 SBSQ HOSP IP/OBS MODERATE 35: CPT

## 2021-03-26 RX ADMIN — Medication 2 MILLIGRAM(S): at 20:16

## 2021-03-26 RX ADMIN — Medication 2 MILLIGRAM(S): at 17:21

## 2021-03-26 RX ADMIN — Medication 2 MILLIGRAM(S): at 08:33

## 2021-03-26 RX ADMIN — LITHIUM CARBONATE 900 MILLIGRAM(S): 300 TABLET, EXTENDED RELEASE ORAL at 20:16

## 2021-03-26 NOTE — BH INPATIENT PSYCHIATRY PROGRESS NOTE - NSBHLOCFT_PSY_A_CORE
Unable to tolerate phone meeting with ACT team, frequently agitated and leaves room but returns;  after meeting, PO PRNs required and says hospital stay will make him homicidal.  State hearing in AM today- does not tolerate well- loud, argumentative, but ultimately redirectible.
Unable to tolerate phone meeting with ACT team, frequently agitated and leaves room but returns;  after meeting, PO PRNs required and says hospital stay will make him homicidal.  State hearing in AM today- does not tolerate well- loud, argumentative, but ultimately redirectible.
Unable to tolerate phone meeting with ACT team, frequently agitated and leaves room but returns;  after meeting, PO PRNs required and says hospital stay will make him homicidal.
Unable to tolerate phone meeting with ACT team, frequently agitated and leaves room but returns;  after meeting, PO PRNs required and says hospital stay will make him homicidal.  State hearing in AM today- does not tolerate well- loud, argumentative, but ultimately redirectible.  Improved, argumentative over changing units d/t covid issues but redirectible.

## 2021-03-26 NOTE — BH INPATIENT PSYCHIATRY PROGRESS NOTE - NSBHFUPINTERVALHXFT_PSY_A_CORE
Previously- recent discharge to rehab:  Pt compliant with medication and tolerating it well.  No events reported overnight.  Pt continues to be happy and motivated to go to inpatient rehab at the end of the week with goal of DC today. Pt brings up his goals of completing rehab, staying sober and eventually starting a family.  Pt acknowledges that if he takes the appropriate actions, and keeps in contact with his ACT team, eventually he can be off of ACT and AOT.  Pt calm and cooperative on the unit with dual RENO medication strategy.  Pt denies SIIP or HIIP and adequately behaviorally controlled.  Pt reports sleeping well. Ready for rehab. Thanks MD for his help. Says he will call to update MD how things go in rehab. Explained RENO schedule.    WED pt returned to unit after transfer from [facility]. Failed rehab as not compliant, and now says "I don't want rehab... I am done with that." Denies substance use problem, says occasional cannabis use, crack use once in his life. Shows no insight, prone to victimization themes and feels that ACT team has set him up for failure, yet has not been compliant with ACT team care, often not findable by ACT team, not compliant with medications, so now on dual RENO regimen. Denies he has any issues, says his payee is not paying him his monies, and he wants writer to help him call payee and obtain money now or by discharge. Also asks writer to help him with calling his mother for unclear reasons. Vague and limited historian, prone to lying frequently and ASPD tendencies [pt known to writer from recent admit, and ACT team known to writer from extensive phone conferences at that time].    FRI No appreciable change in status. Very intrusive with MD, asks for ativan now less often but instead "a brown pill for relaxation". We made plan to call his payee FRI and JULIO and MD did so, with finances/rules again explained to pt. Writer suggested FRI call with MD and JULIO also to mother, but pt refused. Says he has money to pay for rent, but does not explain why he has not done so and remained homeless instead. Says he wants to relocate to [place], has no friends in [facility] area other than those who do drugs here but today says he does not wish to relocate to [place]. Appears ambivalent about all aspects of his care except discharge.  Overall, very prone to reporting what he believes MD would like to hear. Does not recollect any conversations with this MD on this unit last month about likely transfer to [facility] if compliance with rehab fails. Called ACT team TUBALDOMERO and pt asked his questions directly, as he complains ACT team is not forthcoming. Prominent victimization themes continued. Lengthy meeting with ACT team members and director, AOT representative and o/p MD and pt got up, walked out 5-10x during over one hour meeting today via phone conference. Unable to tolerate any role in his admissions or accept personal responsibility, continues to elaborate paranoid victimization themes and accuse his ACT team of lying repeatedly during phone session. Very poor frustration tolerance noted.  Says that hospitalization will make him homicidal. After meeting requires PO PRNs from staff as he has come to understand that State transfer is possible.  Dr. GARCIA feels that no demonstrated compliance and further stabilization at [facility] is required.  Am WED, State hearing accomplished with inpt director. Encouraged pt to consider State to become further stabilized and better address substance abstinence. He is willing to consider, but very argumentative- storms off from interview with inpatient director for State hearing.  Remains intrusive most of the day which continues today- asked pt to be mindful of interfering in care of other patients, which he is doing daily.  In PM WED, informed he is covid NEG as peer on unit was POS. Does not wish to transfer hospitals, he says. Will remain on unit, for now.  Explained this again THURS and today to pt, will retest at day 8, but pt remains very perseverative on discharge, with no insight, paranoid themes continue.  Agrees then to go to Low3 to await covid decontamination on Low6 then return.  No new SEs reported or observed but does appear to have course tremor and mild akathesia, both attenuated.

## 2021-03-26 NOTE — BH INPATIENT PSYCHIATRY PROGRESS NOTE - NSBHASSESSSUMMFT_PSY_ALL_CORE
>Obs: routine checks for safety  >Psychiatric Meds: Taper down Prolixin to 15 PO at bedtime as patient received RENO, [place] Eskalith CR added back and titrated to 1350mg PO at bedtime, with Abilify titrated to 15mg PO at bedtime and add Melatonin 5mg PO at bedtime.  Case discussed with unit chief, Dr. Zapata and patient may benefit from Prolixin Decanoate 37.5mg IM every 3-4 weeks + Aristada 1064mg IM every 6-8 weeks given on 2/23/21.  Case also discussed with pharmacist, Dr. Beckwith, who researched medications and interactions and both can be given on same day in different sites  >Social: milieu therapy  >Treatment: Group therapy, coping skills development for emotion management and mood regulation. Motivational counseling for substance abuse related issues.   >Continue to provide therapeutic interventions in support of treatment goals.  >Dispo: accepted to inpatient rehab with discharge date 3/5/21, sent to rehab, but immediately readmitted to psych at [facility]- transferred back to [facility] for transfer to [facility], likely [facility].

## 2021-03-26 NOTE — BH INPATIENT PSYCHIATRY PROGRESS NOTE - OTHER
pt narrative and MSE avoidant themes and victimization themes Evasive, notably re noncompliance and substance issues- pt denies substances are main issue, endorses 1x cocaine use. coarse tremor and mild akathesia Does have element of pseudocompliance stating what he thinks interviewer would like to hear, ongoing but attenuated.  Answers some questions, then changes subject frequently when asked difficult questions that he wishes to avoid, eg, re compliance, substances, rehab, failing tx with his ACT team.  INTRUSIVE throughout the day, walks off from interview daily. Then constantly demands time from MD to discuss his issues later in the day.

## 2021-03-27 RX ADMIN — LITHIUM CARBONATE 900 MILLIGRAM(S): 300 TABLET, EXTENDED RELEASE ORAL at 21:04

## 2021-03-27 RX ADMIN — Medication 2 MILLIGRAM(S): at 21:04

## 2021-03-28 RX ADMIN — Medication 2 MILLIGRAM(S): at 16:45

## 2021-03-28 RX ADMIN — Medication 2 MILLIGRAM(S): at 10:37

## 2021-03-28 RX ADMIN — LITHIUM CARBONATE 900 MILLIGRAM(S): 300 TABLET, EXTENDED RELEASE ORAL at 20:22

## 2021-03-28 RX ADMIN — Medication 2 MILLIGRAM(S): at 20:22

## 2021-03-29 PROCEDURE — 99231 SBSQ HOSP IP/OBS SF/LOW 25: CPT

## 2021-03-29 RX ADMIN — Medication 2 MILLIGRAM(S): at 18:06

## 2021-03-29 RX ADMIN — Medication 2 MILLIGRAM(S): at 20:30

## 2021-03-29 RX ADMIN — LITHIUM CARBONATE 900 MILLIGRAM(S): 300 TABLET, EXTENDED RELEASE ORAL at 20:30

## 2021-03-29 RX ADMIN — Medication 2 MILLIGRAM(S): at 11:57

## 2021-03-29 NOTE — BH INPATIENT PSYCHIATRY PROGRESS NOTE - NSBHASSESSSUMMFT_PSY_ALL_CORE
>Obs: routine checks for safety  patient apparently on two Juan-prolixin and abilify based--lithium and cogentin

## 2021-03-29 NOTE — BH INPATIENT PSYCHIATRY PROGRESS NOTE - OTHER
INTRUSIVE throughout the day, walks off from interview daily. Then constantly demands time from MD to discuss his issues later in the day. pt narrative and MSE Evasive, notably re noncompliance and substance issues- pt denies substances are main issue, endorses 1x cocaine use. coarse tremor and mild akathesia avoidant themes and victimization themes

## 2021-03-29 NOTE — BH INPATIENT PSYCHIATRY PROGRESS NOTE - NSBHFUPINTERVALHXFT_PSY_A_CORE
Staff report  no interval events, on RENO lithium and cogentin.  Making use of ativan prns.  Will consider taper given his substance use disorder.  Noted to be intrusive at times but in behavioral control.

## 2021-03-30 RX ADMIN — LITHIUM CARBONATE 900 MILLIGRAM(S): 300 TABLET, EXTENDED RELEASE ORAL at 20:24

## 2021-03-30 RX ADMIN — Medication 2 MILLIGRAM(S): at 20:25

## 2021-03-30 RX ADMIN — Medication 2 MILLIGRAM(S): at 08:01

## 2021-03-30 NOTE — BH INPATIENT PSYCHIATRY PROGRESS NOTE - NSBHFUPINTERVALHXFT_PSY_A_CORE
Staff report compliant with medication, ativan prns throughout the day.  He apparently had a bad conversation with his mother on the phone and accuses her of being "like a drill clarisse". He is help seeking help rejecting soliciting us to talk with him and when we do meet, he does not desire to talk and terminates interview, only to approach us again later.

## 2021-03-30 NOTE — BH INPATIENT PSYCHIATRY PROGRESS NOTE - OTHER
less irritable today pt narrative and MSE avoidant themes and victimization themes no tremor noted Help seeking help rejecting Evasive, notably re noncompliance and substance issues- pt denies substances are main issue, endorses 1x cocaine use.

## 2021-03-30 NOTE — BH INPATIENT PSYCHIATRY PROGRESS NOTE - NSBHASSESSSUMMFT_PSY_ALL_CORE
45 year old man, history of multiple recidivist hospitalizations, ACT and AOT, history of substance abuse, now homeless.  He was recently on Low 6, his ACT team advocated for state hospitalization, he was sent to rehab, failed rehab and was medically admitted to [facility] psychiatry, transferred to us and was initially on Low6 but transferred here after Low 6 becomes COVID unit.  He had a state hearing on Low 6 and the plan was for him to go to state.  Here he is not a management issue but is help seeking and help rejecting and using ativan prns which is somewhat torubling in person with substance use problem.  1. Unable to care for self.  Continue 927 hospitalization.  2. Low 6 team completing state packet.  3. Receives lithium 900 qhs and cogentin 2mg qhs  4. thorazine, gabapentin and ativan prns.  will plan to taper ativan prns given history of substance use but will defer until we are more familiar to him  5.  Patient receives two Juan:    Prolixin Decanoate 37.5mg IM every 3 next 4/6/2021  Aristada 1064mg IM every 6-8 weeks given on 2/23/21 next due 4/6/2021.

## 2021-03-31 PROCEDURE — 90837 PSYTX W PT 60 MINUTES: CPT

## 2021-03-31 PROCEDURE — 99223 1ST HOSP IP/OBS HIGH 75: CPT

## 2021-03-31 PROCEDURE — 99232 SBSQ HOSP IP/OBS MODERATE 35: CPT

## 2021-03-31 RX ADMIN — GABAPENTIN 400 MILLIGRAM(S): 400 CAPSULE ORAL at 11:17

## 2021-03-31 RX ADMIN — Medication 2 MILLIGRAM(S): at 10:18

## 2021-03-31 RX ADMIN — Medication 2 MILLIGRAM(S): at 16:42

## 2021-03-31 NOTE — BH PSYCHOLOGY - CLINICIAN PSYCHOTHERAPY NOTE - NSBHPSYCHOLINT_PSY_A_CORE
Cognitive/behavioral therapy/Supported coping skills/Supportive therapy/Treatment compliance encouraged

## 2021-03-31 NOTE — BH INPATIENT PSYCHIATRY PROGRESS NOTE - NSBHFUPINTERVALHXFT_PSY_A_CORE
Staff report compliant with medication, medication seeking at times.  He is help seeking help rejecting soliciting us to talk with him and when we do meet, he does not desire to talk and terminates interview, only to approach us again later. During assessment, pt wanted to focus on discharge and not wanting to go to state hospital. Attempted to refocus pt to assessment questions, but pt then became irritable and exited the interview room and could not be redirected. Pt did not verbalize any A/VH or SIIP today.  Staff report compliant with medication, medication seeking at times.  He is help seeking help rejecting soliciting us to talk with him and when we do meet, he does not desire to talk and terminates interview, only to approach us again later. During assessment, pt wanted to focus on discharge and not wanting to go to [facility] the [facility] hospital. Attempted to refocus pt to assessment questions, but pt then became irritable and exited the interview room and could not be redirected. Pt did not verbalize any A/VH or SIIP today. Per rehab staff, pt made threats that when he is discharged, he will come back and harm staff.

## 2021-03-31 NOTE — BH INPATIENT PSYCHIATRY PROGRESS NOTE - OTHER
avoidant themes and victimization themes pt narrative and MSE Help seeking help rejecting  Unable to tolerate assessment, became agitated and left room  Evasive, notably re noncompliance and substance issues- pt denies substances are main issue, endorses 1x cocaine use.

## 2021-03-31 NOTE — BH PSYCHOLOGY - CLINICIAN PSYCHOTHERAPY NOTE - NSBHPSYCHOLNARRATIVE_PSY_A_CORE FT
Treatment Team and Supervising Psychologist were concerned about the Patient's recent homicidal statements towards his ACT team. Team members reviewed their interactions with each other and realized that he was having different individual experiences with the team. A behavior plan was drawn up to address his statements as well as his increased level of intrusiveness with various team members. Plan was presented to the Patient, who denied different experiences with team members as well as the statements he made prior. However, as the behavior plan was being explained, he packed his personal items into the bags provided. Patient then declared he would not take his medications and did not have any questions regarding the behavior plan.    Behavior Plan  Patient Name: 	Manny BLANCA  Date of Plan:	3/31/21    Explain the behavior you want to change: Mr. Blanca has been demonstrating impulsive and aggressive verbal behaviors (i.e., name calling, threatening staff, yelling at staff).   Reasons for the behavior: Mr. Blanca struggles to remain in behavioral control when he becomes irritated. Patient has had difficulties with impulsivity and treatment compliance in the past.  Appropriate replacement behavior(s):   ?	Verbally appropriate engagement with staff and peers  ?	Positive engagement in his treatment including demonstrating safe and improved behaviors  Support – How to make the adaptive/healthy behavior happen more often:  ?	All personal items shall be removed and safely stored away. Items will be earned once Mr. Blanca has demonstrated five days of safe and improved behaviors. After five days of demonstrating safe and improved behaviors, the following schedule shall begin.  o	Wake-up to Lunch: He will conduct himself appropriately including not engaging in impulsive or verbally aggressive behaviors. He will then be allowed to have one item returned to him. However, if he conducts himself poorly and exhibits verbally aggressive behaviors, he will forfeit that item.   o	Lunch to Bedtime: He will conduct himself appropriately including not engaging in impulsive or verbally aggressive behaviors. He will then be allowed to have one item returned to him. However, if he conducts himself poorly and exhibits verbally aggressive behaviors, he will forfeit that item.  o	If there is a question regarding the giving of an item, the team will hold the item until the issue is resolved by Dr. Ross.  ?	If Mr. Blanca engages in impulsive or verbally aggressive behaviors, the agreement resets and all items will be collected. He will be eligible to earn them again the following day.  ?	Patient will meet with his treatment team once a day. If the patient terminates the meeting with the team before its completion, he will not be able to meet with the team until the following day.     **Due to the Patient’s past Difficulties Engaging Appropriately: If Mr. Blanca becomes verbally/physically aggressive towards staff/patients/self OR refuses to follow staff redirection:  o	Staff will ask Patient to go to room and redirect three times (allowing 30 seconds each time) to comply;  o	If Patient does not comply after the Third Request:  •	Call Support Team  •	Escort to room (or seclusion room)  •	Prepare oral (and IM) PRN medications and encourage oral PRN medications first: one offer.  o	If Patient still does not comply, then:  •	Call Psych Emergency  •	Escort to room (or seclusion room)  •	Encourage IM medications  (Restraints / Seclusion to be used to maintain safety of the unit and as a last resort.)    Evaluate – How will you know if it is working: Mr. Blanca will be able to demonstrate improved positive progress and engagement in his treatment.

## 2021-03-31 NOTE — BH INPATIENT PSYCHIATRY PROGRESS NOTE - NSBHASSESSSUMMFT_PSY_ALL_CORE
45 year old man, history of multiple recidivist hospitalizations, ACT and AOT, history of substance abuse, now homeless.  He was recently on Low 6, his ACT team advocated for state hospitalization, he was sent to rehab, failed rehab and was medically admitted to [facility] psychiatry, transferred to us and was initially on Low6 but transferred here after Low 6 becomes COVID unit.  He had a state hearing on Low 6 and the plan was for him to go to state.  Here he is not a management issue but is help seeking and help rejecting and using ativan prns which is somewhat torubling in person with substance use problem.  1. Unable to care for self.  Continue 927 hospitalization.  2. Low 6 team completing state packet.  3. Receives lithium 900 qhs and cogentin 2mg qhs  4. thorazine, gabapentin and ativan prns.  will plan to taper ativan prns given history of substance use but will defer until we are more familiar to him  5.  Patient receives two Juan:    Prolixin Decanoate 37.5mg IM every 3 next 4/6/2021  Aristada 1064mg IM every 6-8 weeks given on 2/23/21 next due 4/6/2021.   45 year old man, history of multiple recidivist hospitalizations, ACT and AOT, history of substance abuse, now homeless.  He was recently on Low 6, his ACT team advocated for state hospitalization, he was sent to rehab, failed rehab and was medically admitted to [facility] psychiatry, transferred to us and was initially on Low6 but transferred here after Low 6 becomes COVID unit.  He had a state hearing on Low 6 and the plan was for him to go to state.  Here he is not a management issue but is help seeking and help rejecting and using ativan prns which is somewhat troubling in person with substance use problem.  1. Unable to care for self.  Continue 927 hospitalization.  2. Low 6 team completing state packet.  3. Receives lithium 900 qhs and cogentin 2mg qhs  4. thorazine, gabapentin and ativan prns.  will plan to taper ativan prns given history of substance use but will defer until we are more familiar to him  5.  Patient receives two Juan:    Prolixin Decanoate 37.5mg IM every 3 next 4/6/2021  Aristada 1064mg IM every 6-8 weeks given on 2/23/21 next due 4/6/2021.

## 2021-04-01 PROCEDURE — 99232 SBSQ HOSP IP/OBS MODERATE 35: CPT

## 2021-04-01 RX ORDER — ARIPIPRAZOLE 15 MG/1
1064 TABLET ORAL ONCE
Refills: 0 | Status: COMPLETED | OUTPATIENT
Start: 2021-04-06 | End: 2021-04-06

## 2021-04-01 RX ADMIN — LITHIUM CARBONATE 900 MILLIGRAM(S): 300 TABLET, EXTENDED RELEASE ORAL at 21:02

## 2021-04-01 RX ADMIN — Medication 2 MILLIGRAM(S): at 19:22

## 2021-04-01 RX ADMIN — Medication 100 MILLIGRAM(S): at 11:48

## 2021-04-01 RX ADMIN — Medication 2 MILLIGRAM(S): at 10:28

## 2021-04-01 RX ADMIN — Medication 2 MILLIGRAM(S): at 21:02

## 2021-04-01 NOTE — BH TREATMENT PLAN - NSTXPLANTHERAPYSESSIONSFT_PSY_ALL_CORE
03-24-21  Type of therapy: Anger management,Cognitive behavior therapy,Stress management,Symptom management  Type of session: Group  Level of patient participation: Disruptive  Duration of participation: 15 minutes  Therapy conducted by: Psych rehab  Therapy Summary: Writer met with patient to assess progress towards psychiatric rehabilitation goals during the past seven days. During weekly psychiatric rehabilitation assessment patient reported an irritable  mood and was observed with a labile affect, laughing at times as well as reporting increased anger.  Patient has demonstrated minimal progress towards psychiatric rehabilitation goals during the last 7 days and continues to require re-direction due to verbal altercations with staff and peers.      Patient had demonstrated daily medication compliance, requiring PRN medication due to increased irrtiabilty.  Patient has maintained poor ADLs with poor appearance/hygiene, and has not showered daily. Patient is observed wearing the same clothing since admission.   Patient is observed visible in the milieu often speaking loudly and demanding towards staff.  Per chart records and writer’s observations, patient has made minimal improvements in regards to symptoms/functioning. Patient denied suicidal ideation/intent/plan.  Patient endorsed homicidal ideation/intent/plan with plan to "shoot" staff.  Patient denied having access to a gun or weapons.  Patient denied auditory and visual hallucinations. Patient is currently assessed with a disorganized hought process and is assessed with poor insight and judgment.     During the past seven days patient has attended 50% of daily psycho/education groups.  Patient is unable to tolerate structure of groups and often times leaves group early due to low frustration tolerance.  
  03-17-21  Type of therapy: Cognitive behavior therapy  Type of session: Individual  Level of patient participation: Engaged  Duration of participation: 15 minutes  Therapy conducted by: Psych rehab  Therapy Summary: Writer met with patient to introduce patient to psychiatric rehabilitation staff and services. During initial psychiatric rehabilitation assessment patient was observed reportedly "frustrated" mood with a congruent affect. Patient is observed poor ADLs with poor appearance/hygiene.   Patient denied active  ETOH and substance use.  Patient reported adequate sleep and adequate appetite.   Patient reportedmedication and treatment compliance. Patient denied suicidal and homicidal ideation/intent/plan.  Patient denied auditory and visual hallucinations. Patient is currently assessed with a linear, somewhat perseverative thought process with fair fair insight and judgment.     Psych rehab staff will continue to meet with patient daily to build therapeutic rapport as well as assist patient in psychiatric rehabilitation goals pertaining to maintaining behavioral control for improved symptom management.  Patient has not been observed wearing a mask in response to the COVID -19 protocol.  
  03-31-21  --  Type of session: Individual  Level of patient participation: Disruptive,Participated with encouragement  Duration of participation: 15 minutes  Therapy conducted by: Psych rehab  Therapy Summary: Writer met with pt to discuss and assess progress towards specified psychiatric rehabilitation goals this week. On approach, pt seemed tense, however shared that he felt "good" when asked. When writer and pt sat down to speak, pt began meeting with, "I need you to listen to me very carefully and listen to exactly what i'm saying" in a threatening manner as he pointed his finger at writer. Pt noted as irritable as he speaks in a loud and direct manner. Pt began stating that he "considered going into the army" and that "they make killers out of us." Pt perseverative on ACT team and possibility of going to a state hospital. Stating that "my life is over." Writer attempted to support pt, however he presented as help rejecting. Pt began to become incresingly irritable stating "if they keep pushing me, I might have to commit a murder/suicide." Writer asked pt if he was activley experiencing SI/HI/I/P and pt stated, "Well, no. Not right now." When writer asked if he had before, he stated, "No I've never been suicidal, but if they keep pushing me, maybe I will be" (in refrence to the ACT team and state hospital). Pt grinned innapropriatley, got up, and walked away. Pt's insight remains poor. Pt unable to identify coping strategies and changes subject when asked about coping. Pt help seeking/help rejecting at this time.     Patient has not been observed wearing a mask in response to the COVID -19 protocol.    Psychiatric rehabilitation staff will continue to engage patient to assist maintaining behavioral control in order to improve symptom management within seven days.

## 2021-04-01 NOTE — BH TREATMENT PLAN - NSTXMEDICINTERMD_PSY_ALL_CORE
Failed rehab, admitted to [facility] and transferred to [facility] for application to State
Failed rehab, admitted to [facility] and transferred to [facility] for application to State
Failed rehab, admitted to [facility] and transferred to [facility] for application to State

## 2021-04-01 NOTE — BH TREATMENT PLAN - NSTXPROBDISORG_PSY_ALL_CORE
DISORGANIZATION OF THOUGHT/BEHAVIOR

## 2021-04-01 NOTE — BH TREATMENT PLAN - NSTXCOPEINTERPR_PSY_ALL_CORE
Psychiatric Rehabilitation staff will continuously engage patient in order to build on a therapeutic rapport and to assist in achieving the following goals during the next 7 days:maintaining behavioral control for improved symptom management.     Upon discharge, patient may benefit from outpatient psychiatric treatment for medication management, symptom management, and support.
Psychiatric rehabilitation staff will continue to engage patient to assist maintaining behavioral control in order to improve symptom management within seven days.       Patient has not been observed wearing a mask in response to the COVID -19 protocol.    Pt noted as irritable as he is speaking in a loud and direct manner. Pt's insight remains poor. Pt unable to identify coping strategies and changes subject when asked about coping.
Psychiatric rehabilitation staff will continue to engage patient to assist maintaining behavioral control in order to improve symptom management within seven days.       Patient has not been observed wearing a mask in response to the COVID -19 protocol.

## 2021-04-01 NOTE — BH TREATMENT PLAN - NSTXPSYCHOGOAL_PSY_ALL_CORE
Will ask for PRN medication to manage hallucinations
Will identify 1 trigger/stressor that exacerbates hallucinations
Will be able to report experiencing hallucinations to staff
no

## 2021-04-01 NOTE — BH INPATIENT PSYCHIATRY PROGRESS NOTE - NSBHASSESSSUMMFT_PSY_ALL_CORE
45 year old man, history of multiple recidivist hospitalizations, ACT and AOT, history of substance abuse, now homeless.  He was recently on Low 6, his ACT team advocated for state hospitalization, he was sent to rehab, failed rehab and was medically admitted to [facility] psychiatry, transferred to us and was initially on Low6 but transferred here after Low 6 becomes COVID unit.  He had a state hearing on Low 6 and the plan was for him to go to state.  He is extremely intrusive and irritable when redirected.  He frequently solicits interview then terminates it quite quickly, often in an angry fashion.Using ativan prns which is somewhat troubling in person with substance use problem.  He exhibits paradoxical behavior.  [facility] stated [facility]  However his violent talk, repetitive mentions of killing and medication compliance are at odds with his stated goal.      1. Unable to care for self.  Continue 927 hospitalization.  2. Low 6 team completing state packet.  3. Receives lithium 900 qhs and cogentin 2mg qhs  4. thorazine, gabapentin and ativan prns.  will plan to taper ativan prns given history of substance use but will defer and not start taper going into weekend given suspicion he will likely act out/become disruptive.  Will start ativan taper Monday   5. Behavior plan, limit setting  6.  Patient receives two Juan:    Prolixin Decanoate 37.5mg IM every 3 next 4/6/2021  Aristada 1064mg IM every 6-8 weeks given on 2/23/21 next due 4/6/2021.

## 2021-04-01 NOTE — BH TREATMENT PLAN - NSTXCOPEINTERSW_PSY_ALL_CORE
PT will maintain behavioral control.

## 2021-04-01 NOTE — BH TREATMENT PLAN - NSTXCOPEINTERRN_PSY_ALL_CORE
Educate patient on and reinforce positive coping skills.

## 2021-04-01 NOTE — BH TREATMENT PLAN - NSTXIMPULSINTERMD_PSY_ALL_CORE
Prolixin Decanoate and Aristada RENO with Lithium Eskalith CR titration

## 2021-04-01 NOTE — BH TREATMENT PLAN - NSTXCONDUCGOAL_PSY_ALL_CORE
Will describe and accept responsibility for 1 problem behavior
Will develop more adaptive coping strategies to manage stress
Will develop more adaptive coping strategies to manage stress

## 2021-04-01 NOTE — BH TREATMENT PLAN - NSTXDCSOCINTERSW_PSY_ALL_CORE
SW will maintain contact with ACT and AOT, PT reports that they "refused to work with him".
SW will maintain contact with ACT and AOT, PT reports that they "refused to work with him".
Unit SW provided insight, support, and psycho-education while maintaining contact with identified supports.

## 2021-04-01 NOTE — BH INPATIENT PSYCHIATRY PROGRESS NOTE - NSBHFUPINTERVALHXFT_PSY_A_CORE
Staff report refused lithium and benztropine prns.  He was started on a behavior plan because of incessant intrusiveness.  He frequently advocates to talk with team then ends interview suddenly.  Today we informed him we would meet with him once daily and if he were to terminate interview we would not meet with him again today.  We spoke and he complained that "I'm not going to kill anyone even though they are turning me into killers" and stated that he stopped his lithium because of "sexual side effects".  He refused to discuss the nature of the side effects.   He stormed out of the meeting stating, "That's enough, you're not going to get it through your fucking thick skull,"  We reminded him that were he to leave that would not meet again today.  He slammed the door.  Later he approached me asking to talk with him for a moment.  I reminded him of the once daily plan and how he terminated the interview and that we could meet tomorrow.

## 2021-04-01 NOTE — BH TREATMENT PLAN - NSTXIMPULSGOAL_PSY_ALL_CORE
Will be able to demonstrate the ability to pause before acting out negatively
Will be able to describe/demonstrate alternative ways of managing his/her emotional state on the unit
Will be able to demonstrate the ability to pause before acting out negatively

## 2021-04-01 NOTE — BH TREATMENT PLAN - NSTXPROBDCSOC_PSY_ALL_CORE
DISCHARGE ISSUE - POOR SOCIALIZATION IN COMMUNITY

## 2021-04-01 NOTE — BH TREATMENT PLAN - NSTXPATIENTPARTICIPATE_PSY_ALL_CORE
Patient participated in identification of needs/problems/goals for treatment
No, patient unwilling to participate
No, patient unwilling to participate

## 2021-04-01 NOTE — BH TREATMENT PLAN - NSTXDISORGINTERMD_PSY_ALL_CORE
Prolixin Decanoate and Aristada RENO with Lithium Eskalith CR

## 2021-04-01 NOTE — BH TREATMENT PLAN - NSTXPROBDCHOUS_PSY_ALL_CORE
DISCHARGE ISSUE - LACK OF APPROPRIATE HOUSING

## 2021-04-01 NOTE — BH TREATMENT PLAN - NSTXPSYCHOINTERMD_PSY_ALL_CORE
Prolixin with Abilify and Lithobid

## 2021-04-01 NOTE — BH TREATMENT PLAN - NSTXDCHOUSINTERSW_PSY_ALL_CORE
SW will provide supportive contact.  Patient is homeless, living in a shelter.  Discharge plan is for State Transfer - [place].
Unit SW provided insight, support, and psycho-education while maintaining contact with identified supports.

## 2021-04-01 NOTE — BH TREATMENT PLAN - NSTXSUICIDGOAL_PSY_ALL_CORE
Talk to staff and ask for assistance when having suicidal wishes instead of acting out
Be able to state 3 reasons for living
Be able to state 3 reasons for living

## 2021-04-01 NOTE — BH TREATMENT PLAN - NSDCCRITERIA_PSY_ALL_CORE
Stabilization of psychotic symptoms such that patient is no longer presenting as a danger to others with transfer to State

## 2021-04-01 NOTE — BH TREATMENT PLAN - NSTXDCOPNOINTERMD_PSY_ALL_CORE
SW to coordinate care, has ACT and AOT

## 2021-04-01 NOTE — BH TREATMENT PLAN - NSTXMEDICGOAL_PSY_ALL_CORE
Be able to describe the benefit of medication/treatment

## 2021-04-01 NOTE — BH INPATIENT PSYCHIATRY PROGRESS NOTE - OTHER
pt narrative and MSE avoidant themes and victimization themes;  militaristic themes, frequent statements about being made into a killer Evasive, notably re noncompliance and substance issues- pt denies substances are main issue, endorses 1x cocaine use. Help seeking help rejecting, intrusive

## 2021-04-01 NOTE — BH TREATMENT PLAN - NSPTSTATEDGOAL_PSY_ALL_CORE
I want to go [facility] to [place].
I want to go to a shelter- I do not want to go to State. Will that help me?
I want to go to a shelter- I do not want to go to State. Will that help me?

## 2021-04-01 NOTE — BH TREATMENT PLAN - NSBHPRIMARYDX_PSY_ALL_CORE
Schizoaffective disorder, bipolar type    

## 2021-04-01 NOTE — BH TREATMENT PLAN - NSTXDISORGGOAL_PSY_ALL_CORE
Will make at least 3 goal and reality oriented statements during therapy

## 2021-04-01 NOTE — BH TREATMENT PLAN - NSTXDCHOUSGOAL_PSY_ALL_CORE
Will agree to participate in housing process
Will meet with care coordinator and accept services
Will agree to participate in housing process

## 2021-04-01 NOTE — BH TREATMENT PLAN - NSTXDCOPNOINTERSW_PSY_ALL_CORE
Unit SW provided insight, support, and psycho-education while maintaining contact with identified supports.
SW will provide support and commence State Transfer application to [place].

## 2021-04-01 NOTE — BH TREATMENT PLAN - NSCMSPTSTRENGTHS_PSY_ALL_CORE
Assertive/Humor/Physically healthy/Supportive family

## 2021-04-02 PROCEDURE — 99232 SBSQ HOSP IP/OBS MODERATE 35: CPT

## 2021-04-02 RX ADMIN — Medication 2 MILLIGRAM(S): at 14:40

## 2021-04-02 RX ADMIN — Medication 2 MILLIGRAM(S): at 08:19

## 2021-04-02 RX ADMIN — Medication 2 MILLIGRAM(S): at 20:22

## 2021-04-02 RX ADMIN — GABAPENTIN 400 MILLIGRAM(S): 400 CAPSULE ORAL at 16:29

## 2021-04-02 NOTE — BH INPATIENT PSYCHIATRY PROGRESS NOTE - NSBHFUPINTERVALHXFT_PSY_A_CORE
Staff report no interval events, uses ativan prns. Slept, compliant with lithium and cogentin last night.  In better control today and we have a cordial conversation about music.  Heis preoccupied with discharge.  He notes that "I do better on my own because I don't get along with people," and he just wants to be left alone.  We build some bridges.  At the same time the plan appears for him to return to Low 6 when it reopens.  He is happy about that.  Asks after increase in ativan, we discuss that we are actually inclined to taper it given his history of substance use disorder but at this time we will leave it be.

## 2021-04-02 NOTE — BH INPATIENT PSYCHIATRY PROGRESS NOTE - OTHER
avoidant themes and victimization themes;  lapses into paranoid thinking; less militaristic themes neutral pt narrative and MSE "ok" mildly oppositional and seems to reflexively disagree about topics of conversation more cooperative today   rapid staccato speech

## 2021-04-02 NOTE — BH INPATIENT PSYCHIATRY PROGRESS NOTE - NSBHASSESSSUMMFT_PSY_ALL_CORE
45 year old man, history of multiple recidivist hospitalizations, ACT and AOT, history of substance abuse, now homeless.  He was recently on Low 6, his ACT team advocated for state hospitalization, he was sent to rehab, failed rehab and was medically admitted to [facility] psychiatry, transferred to us and was initially on Low6 but transferred here after Low 6 becomes COVID unit.  He had a state hearing on Low 6 and the plan was for him to go to state.  He is extremely intrusive and irritable when redirected.  He frequently solicits interview then terminates it quite quickly, often in an angry fashion.Using ativan prns which is somewhat troubling in person with substance use problem.  He exhibits paradoxical behavior.  His stated goal is to not be transferred to [facility].  However his violent talk, repetitive mentions of killing and medication compliance are at odds with his stated goal.  He had a better day today after some boundary setting and on behavior plan    1. Unable to care for self.  Continue 927 hospitalization.  2. Low 6 team completing state packet.  3. Receives lithium 900 qhs and cogentin 2mg qhs  4. thorazine, gabapentin and ativan prns.  will plan to taper ativan prns given history of substance use but will defer and not start taper going into weekend given suspicion he will likely act out/become disruptive.    5. Behavior plan, limit setting  6.  Patient receives two Juan:    Prolixin Decanoate 37.5mg IM every 3 next 4/6/2021  Aristada 1064mg IM every 6-8 weeks given on 2/23/21 next due 4/6/2021.

## 2021-04-03 LAB — SARS-COV-2 RNA SPEC QL NAA+PROBE: SIGNIFICANT CHANGE UP

## 2021-04-03 RX ADMIN — Medication 2 MILLIGRAM(S): at 14:59

## 2021-04-03 RX ADMIN — Medication 2 MILLIGRAM(S): at 20:44

## 2021-04-03 RX ADMIN — Medication 2 MILLIGRAM(S): at 08:51

## 2021-04-04 RX ADMIN — Medication 2 MILLIGRAM(S): at 08:59

## 2021-04-04 RX ADMIN — Medication 2 MILLIGRAM(S): at 16:45

## 2021-04-05 PROCEDURE — 99232 SBSQ HOSP IP/OBS MODERATE 35: CPT

## 2021-04-05 RX ADMIN — Medication 2 MILLIGRAM(S): at 18:05

## 2021-04-05 RX ADMIN — Medication 2 MILLIGRAM(S): at 11:23

## 2021-04-05 RX ADMIN — Medication 2 MILLIGRAM(S): at 01:48

## 2021-04-05 NOTE — BH INPATIENT PSYCHIATRY PROGRESS NOTE - NSBHASSESSSUMMFT_PSY_ALL_CORE
45 year old man, history of multiple recidivist hospitalizations, ACT and AOT, history of substance abuse, now homeless.  He was recently on Low 6, his ACT team advocated for state hospitalization, he was sent to rehab, failed rehab and was medically admitted to [facility] psychiatry, transferred to us and was initially on Low6 but transferred here after Low 6 becomes COVID unit.  He had a state hearing on Low 6 and the plan was for him to go to state.  He is extremely intrusive and irritable when redirected.  He frequently solicits interview then terminates it quite quickly, often in an angry fashion.Using ativan prns which is somewhat troubling in person with substance use problem.  He exhibits paradoxical behavior.  [facility]  He is in better control but refusing lithiu, although he reminded staff that he is soon due for his Juan    1. Unable to care for self.  Continue 927 hospitalization.  2. Low 6 team completing state packet.  3. Receives lithium 900 qhs and cogentin 2mg qhs  4. thorazine, gabapentin and ativan prns.  will plan to taper ativan prns given history of substance use but will defer and not start taper going into weekend given suspicion he will likely act out/become disruptive.    5. Behavior plan, limit setting  6.  Patient receives two Juan:    Prolixin Decanoate 37.5mg IM every 3 next 4/6/2021  Aristada 1064mg IM every 6-8 weeks given on 2/23/21 next due 4/6/2021.

## 2021-04-05 NOTE — BH INPATIENT PSYCHIATRY PROGRESS NOTE - OTHER
grossly cooperative   pt narrative and MSE rapid staccato speech neutral still somewhat oppositional and negativistic but not inappropriate avoidant themes and victimization themes;  lapses into paranoid thinking; less militaristic themes "good"

## 2021-04-05 NOTE — BH INPATIENT PSYCHIATRY PROGRESS NOTE - NSBHFUPINTERVALHXFT_PSY_A_CORE
Staff report no interval events, uses ativan prns. He has been refusing lithium and cogentin but reminded staff that he is due for his Juan tomorrow.  We provided teaching and our strong recommendation that he continue lithium and cogentin.

## 2021-04-05 NOTE — ED BEHAVIORAL HEALTH ASSESSMENT NOTE - NS ED BHA TELEPSYCH PATIENT INTERVIEW PRIVATE SPACE
Pt sent the Eadvice\"I have noticed minor chest pains that come and go over the fast few weeks and my resting heart rate is 45bpm - 48bpm.  Is that normal for 61 year old female?\"    Pt not seen since 7/2020 by Dr EDILMA Villela. HR 45-61 is to low,pt is on the BB if pt develops C/p she should go to the ER.Responded to pt already through the EAdvice.    Can you please call pt to schedule apt with Flores this week for slow HR and C/p.       Patient interviewed in a private space.

## 2021-04-06 VITALS — TEMPERATURE: 98 F

## 2021-04-06 PROCEDURE — 99239 HOSP IP/OBS DSCHRG MGMT >30: CPT

## 2021-04-06 RX ORDER — BENZTROPINE MESYLATE 1 MG
1 TABLET ORAL
Qty: 30 | Refills: 0
Start: 2021-04-06 | End: 2021-05-05

## 2021-04-06 RX ORDER — FLUPHENAZINE HYDROCHLORIDE 1 MG/1
37.5 TABLET, FILM COATED ORAL
Qty: 0 | Refills: 0 | DISCHARGE
Start: 2021-04-06

## 2021-04-06 RX ORDER — LANOLIN ALCOHOL/MO/W.PET/CERES
1 CREAM (GRAM) TOPICAL
Qty: 0 | Refills: 0 | DISCHARGE

## 2021-04-06 RX ORDER — ARIPIPRAZOLE 15 MG/1
1064 TABLET ORAL
Qty: 0 | Refills: 0 | DISCHARGE
Start: 2021-04-06

## 2021-04-06 RX ORDER — LITHIUM CARBONATE 300 MG/1
2 TABLET, EXTENDED RELEASE ORAL
Qty: 60 | Refills: 0
Start: 2021-04-06 | End: 2021-05-05

## 2021-04-06 RX ADMIN — ARIPIPRAZOLE 1064 MILLIGRAM(S): 15 TABLET ORAL at 09:50

## 2021-04-06 RX ADMIN — FLUPHENAZINE HYDROCHLORIDE 37.5 MILLIGRAM(S): 1 TABLET, FILM COATED ORAL at 16:00

## 2021-04-06 NOTE — BH INPATIENT PSYCHIATRY PROGRESS NOTE - NSTXPROBCOPE_PSY_ALL_CORE
COPING, INEFFECTIVE

## 2021-04-06 NOTE — BH INPATIENT PSYCHIATRY PROGRESS NOTE - NSBHINPTBILLING_PSY_ALL_CORE
18963 - Inpatient Moderate Complexity
38966 - Inpatient Moderate Complexity
99254 - Inpatient Moderate Complexity
72497 - Inpatient Moderate Complexity
Time based billing
37217 - Inpatient Low Complexity
04613 - Inpatient Moderate Complexity
55254 - Inpatient Moderate Complexity
98321 - Inpatient Moderate Complexity
79669 - Inpatient Moderate Complexity
41540 - Inpatient Low Complexity
71961 - Inpatient Moderate Complexity
88819 - Hospital Discharge Day Management; more than 30 min
75843 - Inpatient Moderate Complexity
56772 - Inpatient Low Complexity
69719 - Inpatient Low Complexity
03016 - Inpatient Moderate Complexity

## 2021-04-06 NOTE — BH INPATIENT PSYCHIATRY PROGRESS NOTE - NSTXDCHOUSPROGRES_PSY_ALL_CORE
Improving
Met - goal discontinued
No Change
Met - goal discontinued
No Change
Met - goal discontinued
No Change
Improving
No Change
Improving

## 2021-04-06 NOTE — BH INPATIENT PSYCHIATRY PROGRESS NOTE - NSBHATTENDATTEST_PSY_ALL_CORE
I have personally seen, examined and participated in the care of this patient. I have reviewed all pertinent clinical information, including history, physical exam, plan and the Medical/PA/NP Student’s note and agree except as noted.

## 2021-04-06 NOTE — BH INPATIENT PSYCHIATRY PROGRESS NOTE - NSTXDISORGPROGRES_PSY_ALL_CORE
Met - goal discontinued
Improving
Met - goal discontinued
Met - goal discontinued
Improving
Improving
No Change
Improving
Met - goal discontinued
Improving
Met - goal discontinued
Met - goal discontinued
No Change
Met - goal discontinued

## 2021-04-06 NOTE — BH INPATIENT PSYCHIATRY PROGRESS NOTE - NSTXCONDUCDATETRGT_PSY_ALL_CORE
07-Apr-2021
07-Apr-2021
31-Mar-2021
07-Apr-2021
07-Apr-2021
31-Mar-2021
07-Apr-2021
31-Mar-2021

## 2021-04-06 NOTE — BH DISCHARGE NOTE NURSING/SOCIAL WORK/PSYCH REHAB - NSDCPRGOAL_PSY_ALL_CORE
Patient is being discharged after court hearing. Patient was being followed for mood disorder and psychosis. Patient has demonstrated partial progress towards psychiatric rehabilitation goals. Per treatment team, patient had been intermittently refusing Lithium and Cogentin due to side effects (ie: dizziness), although patient was compliant with RENO and demonstrated an improved understanding of the importance of medication compliance. Patient demonstrated improved insight into the current episode, and was able to identify arguing with others and feeling depressed as warning signs that a crisis may be developing. Patient was able to identify taking walks, going for drives, and eating regularly as healthy and effective coping skills to manage symptoms. Patient expressed readiness for discharge.

## 2021-04-06 NOTE — BH INPATIENT PSYCHIATRY PROGRESS NOTE - NSBHMSEBEHAV_PSY_A_CORE
Cooperative
Cooperative/Other
Cooperative/Other
Uncooperative/Other
Uncooperative/Other
Cooperative
Cooperative
Uncooperative/Other
Cooperative/Uncooperative/Other
Cooperative
Cooperative/Other
Cooperative
Cooperative/Other
Uncooperative/Other
Cooperative

## 2021-04-06 NOTE — BH INPATIENT PSYCHIATRY PROGRESS NOTE - NSTXSUICIDDATETRGT_PSY_ALL_CORE
08-Mar-2021
07-Apr-2021
08-Mar-2021
31-Mar-2021
07-Apr-2021
08-Mar-2021
07-Apr-2021
08-Mar-2021
07-Apr-2021
31-Mar-2021
08-Mar-2021
07-Apr-2021

## 2021-04-06 NOTE — BH DISCHARGE NOTE NURSING/SOCIAL WORK/PSYCH REHAB - NSDCPRRECOMMEND_PSY_ALL_CORE
Psychiatric rehabilitation staff recommends that patient continue to explore and utilize healthy and effective coping skills to manage symptoms and maintain wellness

## 2021-04-06 NOTE — BH INPATIENT PSYCHIATRY PROGRESS NOTE - NSBHMSEAFFRANGE_PSY_A_CORE
Blunted/Constricted
Blunted/Constricted
Constricted
Blunted/Constricted
Constricted
Blunted/Constricted
Blunted
Blunted/Constricted
Blunted/Constricted
Constricted
Blunted/Constricted
Blunted/Constricted
Blunted
Blunted

## 2021-04-06 NOTE — BH INPATIENT PSYCHIATRY PROGRESS NOTE - NSTXDCSOCDATEEST_PSY_ALL_CORE
17-Mar-2021
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
## 2021-04-06 NOTE — BH INPATIENT PSYCHIATRY PROGRESS NOTE - NSCGISEVERILLNESS_PSY_ALL_CORE
5 = Markedly ill - intrusive symptoms that distinctly impair social/occupational function or cause intrusive levels of distress
4 = Moderately ill – overt symptoms causing noticeable, but modest, functional impairment or distress; symptom level may warrant medication
5 = Markedly ill - intrusive symptoms that distinctly impair social/occupational function or cause intrusive levels of distress
5 = Markedly ill - intrusive symptoms that distinctly impair social/occupational function or cause intrusive levels of distress

## 2021-04-06 NOTE — BH INPATIENT PSYCHIATRY PROGRESS NOTE - NSBHASSESSSUMMFT_PSY_ALL_CORE
45 year old man, history of multiple recidivist hospitalizations, ACT and AOT, history of substance abuse, now homeless.  He was recently on Low 6, his ACT team advocated for state hospitalization, he was sent to rehab, failed rehab and was medically admitted to [facility] psychiatry, transferred to us and was initially on Low6 but transferred here after Low 6 becomes COVID unit.  He had a state hearing on Low 6 and the plan was for him to go to state.  He is extremely intrusive and irritable when redirected.  He frequently solicits interview then terminates it quite quickly, often in an angry fashion.Using ativan prns which is somewhat troubling in person with substance use problem.  Court ordered his discharge today.  He received his aristada and prolixin dec mame today.  He was written for a thirty day supply of his meds except ativan for which he receive ativan 1 three times daily for three days then ativan 0.5mg three times daily for three days then stop.    Prolixin Decanoate 37.5mg IM every 3weeks last 4/6/2021  Aristada 1064mg IM every 6-8 weeks last 4/6/2021.

## 2021-04-06 NOTE — BH INPATIENT PSYCHIATRY DISCHARGE NOTE - NSBHMETABOLIC_PSY_ALL_CORE_FT
BMI: BMI (kg/m2): 23.3 (03-05-21 @ 07:57)  HbA1c: A1C with Estimated Average Glucose Result: 6.0 % (02-09-21 @ 10:10)    Glucose:   BP: 102/62 (04-04-21 @ 07:44) (102/62 - 102/62)  Lipid Panel: Date/Time: 02-09-21 @ 10:10  Cholesterol, Serum: 264  Direct LDL: --  HDL Cholesterol, Serum: 84  Total Cholesterol/HDL Ration Measurement: --  Triglycerides, Serum: 130

## 2021-04-06 NOTE — BH INPATIENT PSYCHIATRY PROGRESS NOTE - NSTXCOPEPROGRES_PSY_ALL_CORE
No Change
Met - goal discontinued
Improving
No Change
Improving
No Change
Improving
No Change

## 2021-04-06 NOTE — BH INPATIENT PSYCHIATRY PROGRESS NOTE - NSTXPROBPSYCHO_PSY_ALL_CORE
PSYCHOTIC SYMPTOMS

## 2021-04-06 NOTE — BH INPATIENT PSYCHIATRY PROGRESS NOTE - NSTXIMPULSINTERMD_PSY_ALL_CORE
Prolixin Decanoate and Aristada RENO with Lithium Eskalith CR titration

## 2021-04-06 NOTE — BH INPATIENT PSYCHIATRY PROGRESS NOTE - NSBHMSETHTPROC_PSY_A_CORE
Linear/Overinclusive/Perseverative/Impaired reasoning/Other
Linear/Overinclusive/Circumstantial/Tangential/Perseverative/Impaired reasoning
Linear/Overinclusive/Circumstantial/Tangential/Perseverative/Impaired reasoning
Linear/Overinclusive/Circumstantial/Tangential/Perseverative/Impaired reasoning/Other
Linear/Circumstantial/Tangential
Linear/Overinclusive/Circumstantial/Tangential/Perseverative/Impaired reasoning
Overinclusive/Perseverative/Thought blocking/Impaired reasoning/Other
Linear/Overinclusive/Circumstantial/Tangential/Perseverative/Impaired reasoning/Other
Linear/Overinclusive/Circumstantial/Tangential/Perseverative/Impaired reasoning/Other
Overinclusive/Perseverative/Thought blocking/Impaired reasoning/Other
Linear/Overinclusive/Circumstantial/Tangential/Perseverative/Impaired reasoning/Other
Overinclusive/Perseverative/Thought blocking/Impaired reasoning/Other
Linear/Overinclusive/Circumstantial/Tangential/Perseverative/Impaired reasoning
Linear/Overinclusive/Perseverative/Impaired reasoning/Other
Linear/Overinclusive/Circumstantial/Tangential/Perseverative/Impaired reasoning/Other

## 2021-04-06 NOTE — BH INPATIENT PSYCHIATRY PROGRESS NOTE - NSBHFUPINTERVALHXFT_PSY_A_CORE
Staff report no interval events, refused lithium and cogentin.  He was reported to have slept.  He denied symptoms.  He applied to court for release and court ordered his discharge today.  He received his aristada polk and his prolixin polk today.

## 2021-04-06 NOTE — BH INPATIENT PSYCHIATRY PROGRESS NOTE - NSTXDISORGDATETRGT_PSY_ALL_CORE
10-Mar-2021
10-Mar-2021
31-Mar-2021
10-Mar-2021
07-Apr-2021
31-Mar-2021
10-Mar-2021
07-Apr-2021
10-Mar-2021
07-Apr-2021
31-Mar-2021
10-Mar-2021
10-Mar-2021
31-Mar-2021
31-Mar-2021

## 2021-04-06 NOTE — BH INPATIENT PSYCHIATRY PROGRESS NOTE - NSBHMSEPERCEPT_PSY_A_CORE
Additional Notes: Pt states appeared over night x1 blister in the center we popped and Cx. \\nPt will call if is having s/s of infection
No abnormalities

## 2021-04-06 NOTE — BH INPATIENT PSYCHIATRY PROGRESS NOTE - NSTXMEDICDATETRGT_PSY_ALL_CORE
31-Mar-2021
31-Mar-2021
07-Apr-2021
31-Mar-2021
07-Apr-2021
31-Mar-2021
31-Mar-2021
07-Apr-2021

## 2021-04-06 NOTE — BH INPATIENT PSYCHIATRY PROGRESS NOTE - NSTXIMPULSDATETRGT_PSY_ALL_CORE
31-Mar-2021
10-Mar-2021
07-Apr-2021
10-Mar-2021
10-Mar-2021
31-Mar-2021
31-Mar-2021
10-Mar-2021
07-Apr-2021
10-Mar-2021
07-Apr-2021
07-Apr-2021
10-Mar-2021
31-Mar-2021
31-Mar-2021
10-Mar-2021
07-Apr-2021

## 2021-04-06 NOTE — BH INPATIENT PSYCHIATRY PROGRESS NOTE - ADDITIONAL DETAILS / COMMENTS
notably less expansive than recent prior admit
notably less expansive than prior admit
notably less expansive than recent prior admit
notably less expansive than recent prior admit
notably less expansive than prior admit
notably less expansive than recent prior admit
notably less expansive than prior admit
notably less expansive than prior admit
notably less expansive than recent prior admit
notably less expansive than prior admit
notably less expansive than recent prior admit

## 2021-04-06 NOTE — BH INPATIENT PSYCHIATRY PROGRESS NOTE - NSICDXBHPRIMARYDX_PSY_ALL_CORE
Schizoaffective disorder, bipolar type   F25.0  

## 2021-04-06 NOTE — BH INPATIENT PSYCHIATRY PROGRESS NOTE - NSTXDCHOUSINTERMD_PSY_ALL_CORE
wants inpatient rehab
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
## 2021-04-06 NOTE — BH INPATIENT PSYCHIATRY PROGRESS NOTE - NSTXDISORGGOAL_PSY_ALL_CORE
Will make at least 3 goal and reality oriented statements during therapy

## 2021-04-06 NOTE — BH INPATIENT PSYCHIATRY PROGRESS NOTE - NSTXPROBSUICID_PSY_ALL_CORE
SUICIDE/SELF-INJURIOUS BEHAVIOR

## 2021-04-06 NOTE — BH INPATIENT PSYCHIATRY PROGRESS NOTE - NSBHMSEBODY_PSY_A_CORE
Average build

## 2021-04-06 NOTE — BH INPATIENT PSYCHIATRY PROGRESS NOTE - NSTXCOPEDATEEST_PSY_ALL_CORE
17-Mar-2021
16-Mar-2021
31-Mar-2021
27-Mar-2021
16-Mar-2021
27-Mar-2021
17-Mar-2021
16-Mar-2021
27-Mar-2021
17-Mar-2021
16-Mar-2021

## 2021-04-06 NOTE — BH INPATIENT PSYCHIATRY PROGRESS NOTE - NSBHADMITIPREASONDETAILS_PSY_A_CORE FT
Poor self control when not compliant

## 2021-04-06 NOTE — BH INPATIENT PSYCHIATRY PROGRESS NOTE - NSTXIMPULSDATEEST_PSY_ALL_CORE
24-Mar-2021
03-Mar-2021
03-Mar-2021
31-Mar-2021
24-Mar-2021
03-Mar-2021
03-Mar-2021
24-Mar-2021
31-Mar-2021
31-Mar-2021
03-Mar-2021
24-Mar-2021
31-Mar-2021
31-Mar-2021

## 2021-04-06 NOTE — BH INPATIENT PSYCHIATRY PROGRESS NOTE - NSBHCHARTREVIEWVS_PSY_A_CORE FT
Vital Signs Last 24 Hrs  T(C): 36.3 (21 Mar 2021 20:28), Max: 36.3 (21 Mar 2021 20:28)  T(F): 97.4 (21 Mar 2021 20:28), Max: 97.4 (21 Mar 2021 20:28)  HR: --  BP: --  BP(mean): --  RR: 18 (21 Mar 2021 20:28) (18 - 18)  SpO2: --
Vital Signs Last 24 Hrs  T(C): 36.3 (17 Mar 2021 19:10), Max: 36.8 (16 Mar 2021 22:30)  T(F): 97.3 (17 Mar 2021 19:10), Max: 98.3 (16 Mar 2021 22:30)  HR: 69 (17 Mar 2021 07:25) (69 - 69)  BP: 103/61 (17 Mar 2021 07:25) (103/61 - 103/61)  BP(mean): --  RR: 17 (16 Mar 2021 22:30) (17 - 17)  SpO2: 100% (16 Mar 2021 22:30) (100% - 100%)
Vital Signs Last 24 Hrs  T(C): 36.3 (21 Mar 2021 20:28), Max: 36.8 (20 Mar 2021 20:49)  T(F): 97.4 (21 Mar 2021 20:28), Max: 98.2 (20 Mar 2021 20:49)  HR: --  BP: --  BP(mean): --  RR: 18 (21 Mar 2021 20:28) (18 - 20)  SpO2: --
Vital Signs Last 24 Hrs  T(C): 36 (25 Mar 2021 19:42), Max: 36.6 (25 Mar 2021 08:42)  T(F): 96.8 (25 Mar 2021 19:42), Max: 97.9 (25 Mar 2021 08:42)  HR: --  BP: --  BP(mean): --  RR: --  SpO2: --
Vital Signs Last 24 Hrs  T(C): 36.6 (06 Apr 2021 11:16), Max: 36.6 (06 Apr 2021 11:16)  T(F): 97.8 (06 Apr 2021 11:16), Max: 97.8 (06 Apr 2021 11:16)  Orthostatic VS    04-06-21 @ 11:16  Lying BP: --/-- HR: --   Sitting BP: 112/90 HR: 73  Standing BP: --/-- HR: --  Site: --   Mode: --    04-05-21 @ 07:50  Lying BP: --/-- HR: --   Sitting BP: 108/65 HR: 69  Standing BP: --/-- HR: --  Site: --   Mode: --  
Vital Signs Last 24 Hrs  T(C): 36.5 (24 Mar 2021 06:17), Max: 36.5 (24 Mar 2021 06:17)  T(F): 97.7 (24 Mar 2021 06:17), Max: 97.7 (24 Mar 2021 06:17)  HR: --  BP: --  BP(mean): --  RR: --  SpO2: --
Vital Signs Last 24 Hrs  T(C): 36.6 (30 Mar 2021 07:38), Max: 36.6 (29 Mar 2021 19:28)  T(F): 97.8 (30 Mar 2021 07:38), Max: 97.9 (29 Mar 2021 19:28)  HR: --  BP: --  BP(mean): --  RR: --  SpO2: --
Vital Signs Last 24 Hrs  T(C): 36.1 (02 Apr 2021 08:20), Max: 36.8 (01 Apr 2021 19:22)  T(F): 97 (02 Apr 2021 08:20), Max: 98.2 (01 Apr 2021 19:22)  HR: --  BP: 119/70 (02 Apr 2021 08:20) (119/70 - 119/70)  BP(mean): 62 (02 Apr 2021 08:20) (62 - 62)  RR: --  SpO2: --
Vital Signs Last 24 Hrs  T(C): 36.3 (31 Mar 2021 08:18), Max: 36.4 (30 Mar 2021 19:57)  T(F): 97.4 (31 Mar 2021 08:18), Max: 97.5 (30 Mar 2021 19:57)  HR: --  BP: --  BP(mean): --  RR: --  SpO2: --
Vital Signs Last 24 Hrs  T(C): 36.6 (20 Mar 2021 06:38), Max: 36.6 (20 Mar 2021 06:38)  T(F): 97.9 (20 Mar 2021 06:38), Max: 97.9 (20 Mar 2021 06:38)  HR: --  BP: --  BP(mean): --  RR: --  SpO2: --
Vital Signs Last 24 Hrs  T(C): 35.9 (18 Mar 2021 20:15), Max: 36.2 (18 Mar 2021 08:14)  T(F): 96.6 (18 Mar 2021 20:15), Max: 97.1 (18 Mar 2021 08:14)  HR: --  BP: --  BP(mean): --  RR: --  SpO2: --
Vital Signs Last 24 Hrs  T(C): 36.6 (29 Mar 2021 08:11), Max: 36.8 (28 Mar 2021 19:05)  T(F): 97.8 (29 Mar 2021 08:11), Max: 98.3 (28 Mar 2021 19:05)  HR: --  BP: --  BP(mean): --  RR: --  SpO2: --
Vital Signs Last 24 Hrs  T(C): 36.4 (01 Apr 2021 07:35), Max: 36.4 (01 Apr 2021 07:35)  T(F): 97.5 (01 Apr 2021 07:35), Max: 97.5 (01 Apr 2021 07:35)  Orthostatic VS    04-01-21 @ 07:35  Lying BP: --/-- HR: --   Sitting BP: 105/65 HR: 62  Standing BP: --/-- HR: --  Site: --   Mode: --    03-31-21 @ 08:18  Lying BP: --/-- HR: --   Sitting BP: 99/61 HR: 68  Standing BP: --/-- HR: --  Site: --   Mode: --  
Vital Signs Last 24 Hrs  T(C): 36.6 (29 Mar 2021 08:11), Max: 36.8 (28 Mar 2021 19:05)  T(F): 97.8 (29 Mar 2021 08:11), Max: 98.3 (28 Mar 2021 19:05)  HR: --  BP: --  BP(mean): --  RR: --  SpO2: --
Vital Signs Last 24 Hrs  T(C): 36.3 (23 Mar 2021 18:25), Max: 36.6 (23 Mar 2021 07:50)  T(F): 97.3 (23 Mar 2021 18:25), Max: 97.9 (23 Mar 2021 07:50)  HR: --  BP: --  BP(mean): --  RR: --  SpO2: --
Vital Signs Last 24 Hrs  T(C): 36.4 (05 Apr 2021 07:50), Max: 36.6 (04 Apr 2021 19:26)  T(F): 97.5 (05 Apr 2021 07:50), Max: 97.8 (04 Apr 2021 19:26)  Orthostatic VS    04-05-21 @ 07:50  Lying BP: --/-- HR: --   Sitting BP: 108/65 HR: 69  Standing BP: --/-- HR: --  Site: --   Mode: --  
Vital Signs Last 24 Hrs  T(C): 36.7 (22 Mar 2021 07:51), Max: 36.7 (22 Mar 2021 07:51)  T(F): 98 (22 Mar 2021 07:51), Max: 98 (22 Mar 2021 07:51)  HR: --  BP: --  BP(mean): --  RR: 18 (21 Mar 2021 20:28) (18 - 18)  SpO2: --

## 2021-04-06 NOTE — BH DISCHARGE NOTE NURSING/SOCIAL WORK/PSYCH REHAB - NSCDUDCCRISIS_PSY_A_CORE
[facility]  1 [phone]) [facility]LL ([phone])  Text "WELL" to 74800  Website: www.NewComLink/.[employer] [facility] 1 [phone] 371-VIRK (2731) Website: www.safehorizon.org/.National Suicide Prevention Lifeline 8 (494) 745-6151/.  [employer]  1 (299) LIFENET (626-7652)/.  [facility]  [phone] [place] [phone]   Hours:  Monday through Friday from 9 AM to 3 PM/.  [employer] - [facility] Crisis Line  [phone], Option 1

## 2021-04-06 NOTE — BH INPATIENT PSYCHIATRY PROGRESS NOTE - NSTXMEDICDATEEST_PSY_ALL_CORE
24-Mar-2021
31-Mar-2021
24-Mar-2021
31-Mar-2021
24-Mar-2021
31-Mar-2021

## 2021-04-06 NOTE — BH INPATIENT PSYCHIATRY PROGRESS NOTE - NSTXPROBMEDIC_PSY_ALL_CORE
MEDICATION/TREATMENT NON-COMPLIANCE

## 2021-04-06 NOTE — BH INPATIENT PSYCHIATRY PROGRESS NOTE - NSBHMETABOLIC_PSY_ALL_CORE_FT
BMI: BMI (kg/m2): 23.3 (03-05-21 @ 07:57)  HbA1c: A1C with Estimated Average Glucose Result: 6.0 % (02-09-21 @ 10:10)    Glucose:   BP: 103/61 (03-17-21 @ 07:25) (103/61 - 103/61)  Lipid Panel: Date/Time: 02-09-21 @ 10:10  Cholesterol, Serum: 264  Direct LDL: --  HDL Cholesterol, Serum: 84  Total Cholesterol/HDL Ration Measurement: --  Triglycerides, Serum: 130  
BMI: BMI (kg/m2): 23.3 (03-05-21 @ 07:57)  HbA1c: A1C with Estimated Average Glucose Result: 6.0 % (02-09-21 @ 10:10)    Glucose:   BP: 103/61 (03-17-21 @ 07:25) (103/61 - 103/61)  Lipid Panel: Date/Time: 02-09-21 @ 10:10  Cholesterol, Serum: 264  Direct LDL: --  HDL Cholesterol, Serum: 84  Total Cholesterol/HDL Ration Measurement: --  Triglycerides, Serum: 130  
BMI: BMI (kg/m2): 23.3 (03-05-21 @ 07:57)  HbA1c: A1C with Estimated Average Glucose Result: 6.0 % (02-09-21 @ 10:10)    Glucose:   BP: --  Lipid Panel: Date/Time: 02-09-21 @ 10:10  Cholesterol, Serum: 264  Direct LDL: --  HDL Cholesterol, Serum: 84  Total Cholesterol/HDL Ration Measurement: --  Triglycerides, Serum: 130  
BMI: BMI (kg/m2): 23.3 (03-05-21 @ 07:57)  HbA1c: A1C with Estimated Average Glucose Result: 6.0 % (02-09-21 @ 10:10)    Glucose:   BP: --  Lipid Panel: Date/Time: 02-09-21 @ 10:10  Cholesterol, Serum: 264  Direct LDL: --  HDL Cholesterol, Serum: 84  Total Cholesterol/HDL Ration Measurement: --  Triglycerides, Serum: 130  
BMI: BMI (kg/m2): 23.3 (03-05-21 @ 07:57)  HbA1c: A1C with Estimated Average Glucose Result: 6.0 % (02-09-21 @ 10:10)    Glucose:   BP: 102/62 (04-04-21 @ 07:44) (102/62 - 110/83)  Lipid Panel: Date/Time: 02-09-21 @ 10:10  Cholesterol, Serum: 264  Direct LDL: --  HDL Cholesterol, Serum: 84  Total Cholesterol/HDL Ration Measurement: --  Triglycerides, Serum: 130  
BMI: BMI (kg/m2): 23.3 (03-05-21 @ 07:57)  HbA1c: A1C with Estimated Average Glucose Result: 6.0 % (02-09-21 @ 10:10)    Glucose:   BP: --  Lipid Panel: Date/Time: 02-09-21 @ 10:10  Cholesterol, Serum: 264  Direct LDL: --  HDL Cholesterol, Serum: 84  Total Cholesterol/HDL Ration Measurement: --  Triglycerides, Serum: 130  
BMI: BMI (kg/m2): 23.3 (03-05-21 @ 07:57)  HbA1c: A1C with Estimated Average Glucose Result: 6.0 % (02-09-21 @ 10:10)    Glucose:   BP: 102/62 (04-04-21 @ 07:44) (102/62 - 102/62)  Lipid Panel: Date/Time: 02-09-21 @ 10:10  Cholesterol, Serum: 264  Direct LDL: --  HDL Cholesterol, Serum: 84  Total Cholesterol/HDL Ration Measurement: --  Triglycerides, Serum: 130  
BMI: BMI (kg/m2): 23.3 (03-05-21 @ 07:57)  HbA1c: A1C with Estimated Average Glucose Result: 6.0 % (02-09-21 @ 10:10)    Glucose:   BP: --  Lipid Panel: Date/Time: 02-09-21 @ 10:10  Cholesterol, Serum: 264  Direct LDL: --  HDL Cholesterol, Serum: 84  Total Cholesterol/HDL Ration Measurement: --  Triglycerides, Serum: 130  
BMI: BMI (kg/m2): 23.3 (03-05-21 @ 07:57)  HbA1c: A1C with Estimated Average Glucose Result: 6.0 % (02-09-21 @ 10:10)    Glucose:   BP: 119/70 (04-02-21 @ 08:20) (119/70 - 119/70)  Lipid Panel: Date/Time: 02-09-21 @ 10:10  Cholesterol, Serum: 264  Direct LDL: --  HDL Cholesterol, Serum: 84  Total Cholesterol/HDL Ration Measurement: --  Triglycerides, Serum: 130  
BMI: BMI (kg/m2): 23.3 (03-05-21 @ 07:57)  HbA1c: A1C with Estimated Average Glucose Result: 6.0 % (02-09-21 @ 10:10)    Glucose:   BP: --  Lipid Panel: Date/Time: 02-09-21 @ 10:10  Cholesterol, Serum: 264  Direct LDL: --  HDL Cholesterol, Serum: 84  Total Cholesterol/HDL Ration Measurement: --  Triglycerides, Serum: 130  
BMI: BMI (kg/m2): 23.3 (03-05-21 @ 07:57)  HbA1c: A1C with Estimated Average Glucose Result: 6.0 % (02-09-21 @ 10:10)    Glucose:   BP: --  Lipid Panel: Date/Time: 02-09-21 @ 10:10  Cholesterol, Serum: 264  Direct LDL: --  HDL Cholesterol, Serum: 84  Total Cholesterol/HDL Ration Measurement: --  Triglycerides, Serum: 130

## 2021-04-06 NOTE — BH INPATIENT PSYCHIATRY PROGRESS NOTE - NSTXMEDICINTERMD_PSY_ALL_CORE
Failed rehab, admitted to [facility] and transferred to [facility] for application to State
Failed rehab, admitted to [facility] and transferred to [facility] for application to State
Failed rehab, admitted to [facility] and transferred to [facility] for application to State
Failed rehab, admitted to [facility] and transferred to [facility] for application to State
Failed rehab, admitted to [facility] and transferred to [facility] for application to State
Failed rehab, admitted to [facility] and transferred to [facility] for application to State
Failed rehab, admitted to [facility] and transferred to [facility] for application to State
Failed rehab, admitted to [facility] and transferred to [facility] for application to State
Failed rehab, admitted to [facility] and transferred to [facility] for application to State
Failed rehab, admitted to [facility] and transferred to [facility] for application to State
Failed rehab, admitted to [facility] and transferred to [facility] for application to State
Failed rehab, admitted to [facility] and transferred to [facility] for application to State
Failed rehab, admitted to [facility] and transferred to [facility] for application to State
Failed rehab, admitted to [facility] and transferred to [facility] for application to State
Failed rehab, admitted to [facility] and transferred to [facility] for application to State
Failed rehab, admitted to [facility] and transferred to [facility] for application to State
Failed rehab, admitted to [facility] and transferred to [facility] for application to State

## 2021-04-06 NOTE — BH INPATIENT PSYCHIATRY PROGRESS NOTE - NSBHMSEKNOWHOW_PSY_ALL_CORE
Current Events/Educational attainment/Vocabulary/Other...
Vocabulary
Current Events/Educational attainment/Vocabulary/Other...
Vocabulary
Current Events/Educational attainment/Vocabulary/Other...

## 2021-04-06 NOTE — BH INPATIENT PSYCHIATRY PROGRESS NOTE - PRN MEDS
MEDICATIONS  (PRN):  chlorproMAZINE    Injectable 100 milliGRAM(s) IntraMuscular every 4 hours PRN severe agitation or aggression  chlorproMAZINE    Tablet 50 milliGRAM(s) Oral every 4 hours PRN anxiety or mild agitation  chlorproMAZINE    Tablet 100 milliGRAM(s) Oral every 4 hours PRN severe agitation or aggression  diphenhydrAMINE   Injectable 50 milliGRAM(s) IntraMuscular once PRN agitation  gabapentin 400 milliGRAM(s) Oral four times a day PRN anxiety  haloperidol    Injectable 5 milliGRAM(s) IntraMuscular once PRN agitation  LORazepam     Tablet 2 milliGRAM(s) Oral every 6 hours PRN anxiety or agitation  LORazepam   Injectable 3 milliGRAM(s) IntraMuscular once PRN agitation  
MEDICATIONS  (PRN):  chlorproMAZINE    Injectable 100 milliGRAM(s) IntraMuscular every 4 hours PRN severe agitation or aggression  chlorproMAZINE    Tablet 50 milliGRAM(s) Oral every 4 hours PRN anxiety or mild agitation  chlorproMAZINE    Tablet 100 milliGRAM(s) Oral every 4 hours PRN severe agitation or aggression  diphenhydrAMINE   Injectable 50 milliGRAM(s) IntraMuscular once PRN agitation  gabapentin 400 milliGRAM(s) Oral four times a day PRN anxiety  haloperidol    Injectable 5 milliGRAM(s) IntraMuscular once PRN agitation  LORazepam   Injectable 2 milliGRAM(s) IntraMuscular once PRN agitation  
MEDICATIONS  (PRN):  chlorproMAZINE    Injectable 100 milliGRAM(s) IntraMuscular every 4 hours PRN severe agitation or aggression  chlorproMAZINE    Tablet 50 milliGRAM(s) Oral every 4 hours PRN anxiety or mild agitation  chlorproMAZINE    Tablet 100 milliGRAM(s) Oral every 4 hours PRN severe agitation or aggression  diphenhydrAMINE   Injectable 50 milliGRAM(s) IntraMuscular once PRN agitation  gabapentin 400 milliGRAM(s) Oral four times a day PRN anxiety  haloperidol    Injectable 5 milliGRAM(s) IntraMuscular once PRN agitation  LORazepam     Tablet 2 milliGRAM(s) Oral every 6 hours PRN anxiety or agitation  LORazepam   Injectable 3 milliGRAM(s) IntraMuscular once PRN agitation  
MEDICATIONS  (PRN):  chlorproMAZINE    Injectable 100 milliGRAM(s) IntraMuscular every 4 hours PRN severe agitation or aggression  chlorproMAZINE    Tablet 50 milliGRAM(s) Oral every 4 hours PRN anxiety or mild agitation  chlorproMAZINE    Tablet 100 milliGRAM(s) Oral every 4 hours PRN severe agitation or aggression  diphenhydrAMINE   Injectable 50 milliGRAM(s) IntraMuscular once PRN agitation  gabapentin 400 milliGRAM(s) Oral four times a day PRN anxiety  haloperidol    Injectable 5 milliGRAM(s) IntraMuscular once PRN agitation  LORazepam     Tablet 2 milliGRAM(s) Oral every 6 hours PRN anxiety  LORazepam   Injectable 3 milliGRAM(s) IntraMuscular once PRN agitation  
MEDICATIONS  (PRN):  chlorproMAZINE    Injectable 100 milliGRAM(s) IntraMuscular every 4 hours PRN severe agitation or aggression  chlorproMAZINE    Tablet 50 milliGRAM(s) Oral every 4 hours PRN anxiety or mild agitation  chlorproMAZINE    Tablet 100 milliGRAM(s) Oral every 4 hours PRN severe agitation or aggression  diphenhydrAMINE   Injectable 50 milliGRAM(s) IntraMuscular once PRN agitation  gabapentin 400 milliGRAM(s) Oral four times a day PRN anxiety  haloperidol    Injectable 5 milliGRAM(s) IntraMuscular once PRN agitation  LORazepam   Injectable 2 milliGRAM(s) IntraMuscular once PRN agitation  
MEDICATIONS  (PRN):  chlorproMAZINE    Injectable 100 milliGRAM(s) IntraMuscular every 4 hours PRN severe agitation or aggression  chlorproMAZINE    Tablet 50 milliGRAM(s) Oral every 4 hours PRN anxiety or mild agitation  chlorproMAZINE    Tablet 100 milliGRAM(s) Oral every 4 hours PRN severe agitation or aggression  diphenhydrAMINE   Injectable 50 milliGRAM(s) IntraMuscular once PRN agitation  gabapentin 400 milliGRAM(s) Oral four times a day PRN anxiety  haloperidol    Injectable 5 milliGRAM(s) IntraMuscular once PRN agitation  LORazepam     Tablet 2 milliGRAM(s) Oral every 6 hours PRN anxiety or agitation  LORazepam   Injectable 3 milliGRAM(s) IntraMuscular once PRN agitation  
MEDICATIONS  (PRN):  chlorproMAZINE    Injectable 100 milliGRAM(s) IntraMuscular every 4 hours PRN severe agitation or aggression  chlorproMAZINE    Tablet 50 milliGRAM(s) Oral every 4 hours PRN anxiety or mild agitation  chlorproMAZINE    Tablet 100 milliGRAM(s) Oral every 4 hours PRN severe agitation or aggression  diphenhydrAMINE   Injectable 50 milliGRAM(s) IntraMuscular once PRN agitation  gabapentin 400 milliGRAM(s) Oral four times a day PRN anxiety  haloperidol    Injectable 5 milliGRAM(s) IntraMuscular once PRN agitation  LORazepam   Injectable 2 milliGRAM(s) IntraMuscular once PRN agitation  
MEDICATIONS  (PRN):  chlorproMAZINE    Injectable 100 milliGRAM(s) IntraMuscular every 4 hours PRN severe agitation or aggression  chlorproMAZINE    Tablet 50 milliGRAM(s) Oral every 4 hours PRN anxiety or mild agitation  chlorproMAZINE    Tablet 100 milliGRAM(s) Oral every 4 hours PRN severe agitation or aggression  diphenhydrAMINE   Injectable 50 milliGRAM(s) IntraMuscular once PRN agitation  gabapentin 400 milliGRAM(s) Oral four times a day PRN anxiety  haloperidol    Injectable 5 milliGRAM(s) IntraMuscular once PRN agitation  LORazepam   Injectable 2 milliGRAM(s) IntraMuscular once PRN agitation  
MEDICATIONS  (PRN):  chlorproMAZINE    Injectable 100 milliGRAM(s) IntraMuscular every 4 hours PRN severe agitation or aggression  chlorproMAZINE    Tablet 50 milliGRAM(s) Oral every 4 hours PRN anxiety or mild agitation  chlorproMAZINE    Tablet 100 milliGRAM(s) Oral every 4 hours PRN severe agitation or aggression  diphenhydrAMINE   Injectable 50 milliGRAM(s) IntraMuscular once PRN agitation  gabapentin 400 milliGRAM(s) Oral four times a day PRN anxiety  haloperidol    Injectable 5 milliGRAM(s) IntraMuscular once PRN agitation  LORazepam     Tablet 2 milliGRAM(s) Oral every 6 hours PRN anxiety or agitation  LORazepam   Injectable 3 milliGRAM(s) IntraMuscular once PRN agitation  
MEDICATIONS  (PRN):  diphenhydrAMINE   Injectable 50 milliGRAM(s) IntraMuscular once PRN agitation  haloperidol    Injectable 5 milliGRAM(s) IntraMuscular once PRN agitation  hydrOXYzine hydrochloride 50 milliGRAM(s) Oral every 6 hours PRN anxiety or agitation  LORazepam   Injectable 2 milliGRAM(s) IntraMuscular once PRN agitation  melatonin. 5 milliGRAM(s) Oral at bedtime PRN Insomnia  
MEDICATIONS  (PRN):  chlorproMAZINE    Injectable 100 milliGRAM(s) IntraMuscular every 4 hours PRN severe agitation or aggression  chlorproMAZINE    Tablet 50 milliGRAM(s) Oral every 4 hours PRN anxiety or mild agitation  chlorproMAZINE    Tablet 100 milliGRAM(s) Oral every 4 hours PRN severe agitation or aggression  diphenhydrAMINE   Injectable 50 milliGRAM(s) IntraMuscular once PRN agitation  gabapentin 400 milliGRAM(s) Oral four times a day PRN anxiety  haloperidol    Injectable 5 milliGRAM(s) IntraMuscular once PRN agitation  LORazepam     Tablet 2 milliGRAM(s) Oral every 6 hours PRN anxiety or agitation  LORazepam   Injectable 3 milliGRAM(s) IntraMuscular once PRN agitation  
MEDICATIONS  (PRN):  chlorproMAZINE    Injectable 100 milliGRAM(s) IntraMuscular every 4 hours PRN severe agitation or aggression  chlorproMAZINE    Tablet 50 milliGRAM(s) Oral every 4 hours PRN anxiety or mild agitation  chlorproMAZINE    Tablet 100 milliGRAM(s) Oral every 4 hours PRN severe agitation or aggression  diphenhydrAMINE   Injectable 50 milliGRAM(s) IntraMuscular once PRN agitation  gabapentin 400 milliGRAM(s) Oral four times a day PRN anxiety  haloperidol    Injectable 5 milliGRAM(s) IntraMuscular once PRN agitation  LORazepam   Injectable 2 milliGRAM(s) IntraMuscular once PRN agitation  
MEDICATIONS  (PRN):  chlorproMAZINE    Injectable 100 milliGRAM(s) IntraMuscular every 4 hours PRN severe agitation or aggression  chlorproMAZINE    Tablet 50 milliGRAM(s) Oral every 4 hours PRN anxiety or mild agitation  chlorproMAZINE    Tablet 100 milliGRAM(s) Oral every 4 hours PRN severe agitation or aggression  diphenhydrAMINE   Injectable 50 milliGRAM(s) IntraMuscular once PRN agitation  gabapentin 400 milliGRAM(s) Oral four times a day PRN anxiety  haloperidol    Injectable 5 milliGRAM(s) IntraMuscular once PRN agitation  LORazepam     Tablet 2 milliGRAM(s) Oral every 6 hours PRN anxiety or agitation  LORazepam   Injectable 3 milliGRAM(s) IntraMuscular once PRN agitation  

## 2021-04-06 NOTE — BH INPATIENT PSYCHIATRY PROGRESS NOTE - NSTXDCOPNODATEEST_PSY_ALL_CORE
05-Mar-2021

## 2021-04-06 NOTE — BH INPATIENT PSYCHIATRY PROGRESS NOTE - NSTXDCOPNOPROGRES_PSY_ALL_CORE
Improving
Met - goal discontinued
Met - goal discontinued
No Change
Met - goal discontinued
No Change
Met - goal discontinued
Improving
Improving
Met - goal discontinued
No Change

## 2021-04-06 NOTE — BH INPATIENT PSYCHIATRY PROGRESS NOTE - NSTXIMPULSGOAL_PSY_ALL_CORE
Will be able to describe/demonstrate alternative ways of managing his/her emotional state on the unit
Will be able to demonstrate the ability to pause before acting out negatively
Will be able to demonstrate the ability to pause before acting out negatively
Will be able to describe/demonstrate alternative ways of managing his/her emotional state on the unit
Will be able to demonstrate the ability to pause before acting out negatively
Will be able to demonstrate the ability to pause before acting out negatively
Will be able to describe/demonstrate alternative ways of managing his/her emotional state on the unit
Will be able to demonstrate the ability to pause before acting out negatively
Will be able to describe/demonstrate alternative ways of managing his/her emotional state on the unit
Will be able to demonstrate the ability to pause before acting out negatively
Will be able to demonstrate the ability to pause before acting out negatively
Will be able to describe/demonstrate alternative ways of managing his/her emotional state on the unit
Will be able to demonstrate the ability to pause before acting out negatively
Will be able to demonstrate the ability to pause before acting out negatively
Will be able to describe/demonstrate alternative ways of managing his/her emotional state on the unit
Will be able to describe/demonstrate alternative ways of managing his/her emotional state on the unit
Will be able to demonstrate the ability to pause before acting out negatively

## 2021-04-06 NOTE — BH INPATIENT PSYCHIATRY PROGRESS NOTE - NSTXDCOPNOINTERMD_PSY_ALL_CORE
SW to coordinate care, has ACT and AOT

## 2021-04-06 NOTE — BH INPATIENT PSYCHIATRY PROGRESS NOTE - NSTXDCHOUSDATETRGT_PSY_ALL_CORE
07-Apr-2021
07-Apr-2021
14-Apr-2021
05-Mar-2021
05-Mar-2021
14-Apr-2021
14-Apr-2021
05-Mar-2021
14-Apr-2021
14-Apr-2021
05-Mar-2021
07-Apr-2021
07-Apr-2021

## 2021-04-06 NOTE — BH INPATIENT PSYCHIATRY PROGRESS NOTE - NSTXCONDUCPROGRES_PSY_ALL_CORE
No Change
Improving
No Change
Improving
No Change
No Change
Improving
No Change
Improving
No Change
No Change
Improving
No Change
No Change
Improving

## 2021-04-06 NOTE — BH INPATIENT PSYCHIATRY PROGRESS NOTE - NSBHMSEAFFQUAL_PSY_A_CORE
Irritable
Euthymic/Irritable/Anxious
Other
Euthymic/Irritable/Anxious
Euthymic/Irritable/Anxious
Euthymic/Anxious/Other
Euthymic/Irritable/Anxious
Other
Other
Euthymic/Irritable/Anxious
Euthymic/Irritable/Anxious
Irritable
Euthymic/Irritable/Anxious
Euthymic/Irritable/Anxious

## 2021-04-06 NOTE — BH INPATIENT PSYCHIATRY PROGRESS NOTE - NSBHMSEMOVE_PSY_A_CORE
No abnormal movements
No abnormal movements
Tremors/Other
No abnormal movements
Tremors/Other
Tremors/Other
No abnormal movements
Tremors/Other
No abnormal movements
No abnormal movements

## 2021-04-06 NOTE — BH INPATIENT PSYCHIATRY PROGRESS NOTE - NSBHCONSBHPROVDETAILS_PSY_A_CORE  FT
Spoke with Dr. Matthew [phone] from [facility]'s [facility], most recently on 3/2/21 with full tx team on conference call.
Spoke with Dr. Vipul GARCIA [phone] from patient's [facility] ACT team, most recently on 3/2/21 with full tx team on conference call.
Spoke with Dr. Matthew [phone] from patient's [facility] team, most recently on 3/2/21 with full tx team on conference call.
Spoke with Dr. Vipul GARCIA [phone] from [facility], most recently on 3/2/21 with full tx team on conference call.
Spoke with Dr. Vipul GARCIA [phone] [facility], most recently on 3/2/21 with full tx team on conference call.
Spoke with Dr. Vipul GARCIA [phone] from [facility], most recently on 3/2/21 with full tx team on conference call.
Spoke with Dr. Matthew [phone] from patient's [facility] team, most recently on 3/2/21 with full tx team on conference call.
Spoke with Dr. Matthew [phone] from patient's [facility] team, most recently on 3/2/21 with full tx team on conference call.
Spoke with Dr. Matthew [phone] from patient's [facility] team, most recently on 3/2/21 with full tx team on conference call.
Spoke with Dr. Matthew [phone] from patient's [facility] team, most recently on 3/2/21 with full tx team on conference call.
Spoke with Dr. Vipul GARCIA [phone] [facility], most recently on 3/2/21 with full tx team on conference call.
Spoke with Dr. Vipul GARCIA [phone] [facility], most recently on 3/2/21 with full tx team on conference call.
Spoke with Dr. Vipul GARCIA [phone] [facility], most recently on 3/2/21 with full tx team on conference call.
Spoke with Dr. Vipul GARCIA [phone] [facility], most recently on 3/2/21 with full tx team on conference call.
Spoke with Dr. Vipul GARCIA [phone] [facility], most recently on 3/2/21 with full tx team on conference call.
Spoke with Dr. Matthew [phone] from patient[facility] team, most recently on 3/2/21 with full tx team on conference call.
Spoke with Dr. Matthew [phone] from patient's [facility] team, most recently on 3/2/21 with full tx team on conference call.

## 2021-04-06 NOTE — BH INPATIENT PSYCHIATRY PROGRESS NOTE - NSTXMEDICGOAL_PSY_ALL_CORE
Be able to describe the benefit of medication/treatment

## 2021-04-06 NOTE — BH INPATIENT PSYCHIATRY PROGRESS NOTE - NSTXPROBDCOPNO_PSY_ALL_CORE
DISCHARGE ISSUE - NON-ADHERENT WITH OUTPATIENT SERVICES

## 2021-04-06 NOTE — BH INPATIENT PSYCHIATRY PROGRESS NOTE - NSTXCONDUCGOAL_PSY_ALL_CORE
Will describe and accept responsibility for 1 problem behavior
Will develop more adaptive coping strategies to manage stress
Will describe and accept responsibility for 1 problem behavior
Will develop more adaptive coping strategies to manage stress
Will develop more adaptive coping strategies to manage stress
Will describe and accept responsibility for 1 problem behavior
Will develop more adaptive coping strategies to manage stress
Will describe and accept responsibility for 1 problem behavior
Will develop more adaptive coping strategies to manage stress
Will describe and accept responsibility for 1 problem behavior
Will develop more adaptive coping strategies to manage stress

## 2021-04-06 NOTE — BH INPATIENT PSYCHIATRY DISCHARGE NOTE - OTHER PAST PSYCHIATRIC HISTORY (INCLUDE DETAILS REGARDING ONSET, COURSE OF ILLNESS, INPATIENT/OUTPATIENT TREATMENT)
h/o over 40 inpatient psychiatric hospitalizations for schizophrenia.  Course is complicated by substance use. Currently followed by [facility] ACT team. Last discharged from hospital one week ago where he was admitted for psychotic sx's.    HPI:  Per H&P/initial assessment at [facility] psych unit 2S:  "Patient evaluated on [facility] and was paranoid, hostile. Upon initial evaluation with this MD, he refused to speak about substance use, however did víctor cocaine use as late as 01/2021. Denies further exdploration of use, and grew more agitated with ongoing examination.    Throughout the duration of his staf, he grew more paranoid, agitated, and disorganized. He expressed both a desire to leave [facility] but also that his treatment team would not help him. He also refused any aftercare arrangements, or discussions with current ACT team. By mid-day on 3/8/21, patient was shouting and pacing the hallways in an agitated state. He denies AH, SI/HI, but was quite paranoid with ongoing evaluations and due to ongoing decompensated psychosis, patient was admitted to 2S."    Hospital course at [facility] was significant for continuation of prior medication, including Abilify Aristada (given 2/23, dose 1064 mg q6w next due 4/6), and addition of Prolixin, initially PO to later Prolixin dec on 3/16. Patient was subsequently transferred to [facility] for further care. Of note, patient did have some behavioral issues while at [facility] including that described above.    On initial assessment, patient found sleeping in bed though is easily rousable. Patient is irritated about being awakened though cooperates superficially with interview. States he was transferred to [facility] "from rehab because they made up some stuff about what I was doing". Patient declines to elaborate, stating "nothing happened." Reports that "I lied to get into rehab so they could get me a place to stay" but after discovering limited and (per pt report) unpalatable aftercare arrangements, patient decided he no longer wanted to remain inpatient. Here, patient states he is feeling fine but is willing to stay hospitalized as it is "a place to stay". Patient denies depressed or elevated mood, depressive or hypo/manic symptoms, AVH, SI/HI. Additionally, patient notes prior substance use but denies recent use of all substances when asked today on interview, stating last cocaine use was January and last cannabis use was October.   h/o over 40 inpatient psychiatric hospitalizations for schizophrenia.  Course is complicated by substance use. Currently followed by [facility] team. Last discharged from hospital one week ago where he was admitted for psychotic sx's.

## 2021-04-06 NOTE — BH INPATIENT PSYCHIATRY PROGRESS NOTE - CURRENT MEDICATION
MEDICATIONS  (STANDING):  benztropine 2 milliGRAM(s) Oral at bedtime  fluPHENAZine decanoate Injectable, Long Acting 37.5 milliGRAM(s) IntraMuscular once  lithium CR (ESKALITH-CR) 900 milliGRAM(s) Oral at bedtime    MEDICATIONS  (PRN):  chlorproMAZINE    Injectable 100 milliGRAM(s) IntraMuscular every 4 hours PRN severe agitation or aggression  chlorproMAZINE    Tablet 50 milliGRAM(s) Oral every 4 hours PRN anxiety or mild agitation  chlorproMAZINE    Tablet 100 milliGRAM(s) Oral every 4 hours PRN severe agitation or aggression  diphenhydrAMINE   Injectable 50 milliGRAM(s) IntraMuscular once PRN agitation  gabapentin 400 milliGRAM(s) Oral four times a day PRN anxiety  haloperidol    Injectable 5 milliGRAM(s) IntraMuscular once PRN agitation  LORazepam     Tablet 2 milliGRAM(s) Oral every 6 hours PRN anxiety  LORazepam   Injectable 3 milliGRAM(s) IntraMuscular once PRN agitation  
MEDICATIONS  (STANDING):  benztropine 2 milliGRAM(s) Oral at bedtime  lithium CR (ESKALITH-CR) 900 milliGRAM(s) Oral at bedtime    MEDICATIONS  (PRN):  chlorproMAZINE    Injectable 100 milliGRAM(s) IntraMuscular every 4 hours PRN severe agitation or aggression  chlorproMAZINE    Tablet 50 milliGRAM(s) Oral every 4 hours PRN anxiety or mild agitation  chlorproMAZINE    Tablet 100 milliGRAM(s) Oral every 4 hours PRN severe agitation or aggression  diphenhydrAMINE   Injectable 50 milliGRAM(s) IntraMuscular once PRN agitation  gabapentin 400 milliGRAM(s) Oral four times a day PRN anxiety  haloperidol    Injectable 5 milliGRAM(s) IntraMuscular once PRN agitation  LORazepam     Tablet 2 milliGRAM(s) Oral every 6 hours PRN anxiety or agitation  LORazepam   Injectable 3 milliGRAM(s) IntraMuscular once PRN agitation  
MEDICATIONS  (STANDING):  benztropine 2 milliGRAM(s) Oral at bedtime  lithium CR (ESKALITH-CR) 900 milliGRAM(s) Oral at bedtime    MEDICATIONS  (PRN):  chlorproMAZINE    Injectable 100 milliGRAM(s) IntraMuscular every 4 hours PRN severe agitation or aggression  chlorproMAZINE    Tablet 50 milliGRAM(s) Oral every 4 hours PRN anxiety or mild agitation  chlorproMAZINE    Tablet 100 milliGRAM(s) Oral every 4 hours PRN severe agitation or aggression  diphenhydrAMINE   Injectable 50 milliGRAM(s) IntraMuscular once PRN agitation  gabapentin 400 milliGRAM(s) Oral four times a day PRN anxiety  haloperidol    Injectable 5 milliGRAM(s) IntraMuscular once PRN agitation  LORazepam   Injectable 2 milliGRAM(s) IntraMuscular once PRN agitation  
MEDICATIONS  (STANDING):  benztropine 2 milliGRAM(s) Oral at bedtime  lithium CR (ESKALITH-CR) 900 milliGRAM(s) Oral at bedtime    MEDICATIONS  (PRN):  chlorproMAZINE    Injectable 100 milliGRAM(s) IntraMuscular every 4 hours PRN severe agitation or aggression  chlorproMAZINE    Tablet 50 milliGRAM(s) Oral every 4 hours PRN anxiety or mild agitation  chlorproMAZINE    Tablet 100 milliGRAM(s) Oral every 4 hours PRN severe agitation or aggression  diphenhydrAMINE   Injectable 50 milliGRAM(s) IntraMuscular once PRN agitation  gabapentin 400 milliGRAM(s) Oral four times a day PRN anxiety  haloperidol    Injectable 5 milliGRAM(s) IntraMuscular once PRN agitation  LORazepam   Injectable 2 milliGRAM(s) IntraMuscular once PRN agitation  
MEDICATIONS  (STANDING):  benztropine 2 milliGRAM(s) Oral at bedtime  lithium CR (ESKALITH-CR) 900 milliGRAM(s) Oral at bedtime    MEDICATIONS  (PRN):  chlorproMAZINE    Injectable 100 milliGRAM(s) IntraMuscular every 4 hours PRN severe agitation or aggression  chlorproMAZINE    Tablet 50 milliGRAM(s) Oral every 4 hours PRN anxiety or mild agitation  chlorproMAZINE    Tablet 100 milliGRAM(s) Oral every 4 hours PRN severe agitation or aggression  diphenhydrAMINE   Injectable 50 milliGRAM(s) IntraMuscular once PRN agitation  gabapentin 400 milliGRAM(s) Oral four times a day PRN anxiety  haloperidol    Injectable 5 milliGRAM(s) IntraMuscular once PRN agitation  LORazepam     Tablet 2 milliGRAM(s) Oral every 6 hours PRN anxiety or agitation  LORazepam   Injectable 3 milliGRAM(s) IntraMuscular once PRN agitation  
MEDICATIONS  (STANDING):  benztropine 2 milliGRAM(s) Oral at bedtime  lithium CR (ESKALITH-CR) 900 milliGRAM(s) Oral at bedtime    MEDICATIONS  (PRN):  chlorproMAZINE    Injectable 100 milliGRAM(s) IntraMuscular every 4 hours PRN severe agitation or aggression  chlorproMAZINE    Tablet 50 milliGRAM(s) Oral every 4 hours PRN anxiety or mild agitation  chlorproMAZINE    Tablet 100 milliGRAM(s) Oral every 4 hours PRN severe agitation or aggression  diphenhydrAMINE   Injectable 50 milliGRAM(s) IntraMuscular once PRN agitation  gabapentin 400 milliGRAM(s) Oral four times a day PRN anxiety  haloperidol    Injectable 5 milliGRAM(s) IntraMuscular once PRN agitation  LORazepam     Tablet 2 milliGRAM(s) Oral every 6 hours PRN anxiety or agitation  LORazepam   Injectable 3 milliGRAM(s) IntraMuscular once PRN agitation  
MEDICATIONS  (STANDING):  benztropine 2 milliGRAM(s) Oral at bedtime  lithium CR (ESKALITH-CR) 900 milliGRAM(s) Oral at bedtime    MEDICATIONS  (PRN):  chlorproMAZINE    Injectable 100 milliGRAM(s) IntraMuscular every 4 hours PRN severe agitation or aggression  chlorproMAZINE    Tablet 50 milliGRAM(s) Oral every 4 hours PRN anxiety or mild agitation  chlorproMAZINE    Tablet 100 milliGRAM(s) Oral every 4 hours PRN severe agitation or aggression  diphenhydrAMINE   Injectable 50 milliGRAM(s) IntraMuscular once PRN agitation  gabapentin 400 milliGRAM(s) Oral four times a day PRN anxiety  haloperidol    Injectable 5 milliGRAM(s) IntraMuscular once PRN agitation  LORazepam   Injectable 2 milliGRAM(s) IntraMuscular once PRN agitation  
MEDICATIONS  (STANDING):  benztropine 2 milliGRAM(s) Oral at bedtime  lithium CR (ESKALITH-CR) 900 milliGRAM(s) Oral at bedtime    MEDICATIONS  (PRN):  chlorproMAZINE    Injectable 100 milliGRAM(s) IntraMuscular every 4 hours PRN severe agitation or aggression  chlorproMAZINE    Tablet 50 milliGRAM(s) Oral every 4 hours PRN anxiety or mild agitation  chlorproMAZINE    Tablet 100 milliGRAM(s) Oral every 4 hours PRN severe agitation or aggression  diphenhydrAMINE   Injectable 50 milliGRAM(s) IntraMuscular once PRN agitation  gabapentin 400 milliGRAM(s) Oral four times a day PRN anxiety  haloperidol    Injectable 5 milliGRAM(s) IntraMuscular once PRN agitation  LORazepam   Injectable 2 milliGRAM(s) IntraMuscular once PRN agitation  
MEDICATIONS  (STANDING):  benztropine 2 milliGRAM(s) Oral at bedtime  lithium CR (ESKALITH-CR) 900 milliGRAM(s) Oral at bedtime    MEDICATIONS  (PRN):  chlorproMAZINE    Injectable 100 milliGRAM(s) IntraMuscular every 4 hours PRN severe agitation or aggression  chlorproMAZINE    Tablet 50 milliGRAM(s) Oral every 4 hours PRN anxiety or mild agitation  chlorproMAZINE    Tablet 100 milliGRAM(s) Oral every 4 hours PRN severe agitation or aggression  diphenhydrAMINE   Injectable 50 milliGRAM(s) IntraMuscular once PRN agitation  gabapentin 400 milliGRAM(s) Oral four times a day PRN anxiety  haloperidol    Injectable 5 milliGRAM(s) IntraMuscular once PRN agitation  LORazepam     Tablet 2 milliGRAM(s) Oral every 6 hours PRN anxiety or agitation  LORazepam   Injectable 3 milliGRAM(s) IntraMuscular once PRN agitation  
MEDICATIONS  (STANDING):  benztropine 2 milliGRAM(s) Oral at bedtime  lithium CR (ESKALITH-CR) 900 milliGRAM(s) Oral at bedtime    MEDICATIONS  (PRN):  chlorproMAZINE    Injectable 100 milliGRAM(s) IntraMuscular every 4 hours PRN severe agitation or aggression  chlorproMAZINE    Tablet 50 milliGRAM(s) Oral every 4 hours PRN anxiety or mild agitation  chlorproMAZINE    Tablet 100 milliGRAM(s) Oral every 4 hours PRN severe agitation or aggression  diphenhydrAMINE   Injectable 50 milliGRAM(s) IntraMuscular once PRN agitation  gabapentin 400 milliGRAM(s) Oral four times a day PRN anxiety  haloperidol    Injectable 5 milliGRAM(s) IntraMuscular once PRN agitation  LORazepam   Injectable 2 milliGRAM(s) IntraMuscular once PRN agitation  
MEDICATIONS  (STANDING):  benztropine 2 milliGRAM(s) Oral at bedtime  lithium CR (ESKALITH-CR) 900 milliGRAM(s) Oral at bedtime    MEDICATIONS  (PRN):  chlorproMAZINE    Injectable 100 milliGRAM(s) IntraMuscular every 4 hours PRN severe agitation or aggression  chlorproMAZINE    Tablet 50 milliGRAM(s) Oral every 4 hours PRN anxiety or mild agitation  chlorproMAZINE    Tablet 100 milliGRAM(s) Oral every 4 hours PRN severe agitation or aggression  diphenhydrAMINE   Injectable 50 milliGRAM(s) IntraMuscular once PRN agitation  gabapentin 400 milliGRAM(s) Oral four times a day PRN anxiety  haloperidol    Injectable 5 milliGRAM(s) IntraMuscular once PRN agitation  LORazepam     Tablet 2 milliGRAM(s) Oral every 6 hours PRN anxiety or agitation  LORazepam   Injectable 3 milliGRAM(s) IntraMuscular once PRN agitation  
MEDICATIONS  (STANDING):  benztropine 2 milliGRAM(s) Oral at bedtime  lithium CR (ESKALITH-CR) 1350 milliGRAM(s) Oral at bedtime  PROLIXIN DEC 37.5 mg q 3 weekly  Abilify ARISTADA 1064 mg q 6 weekly    MEDICATIONS  (PRN):  diphenhydrAMINE   Injectable 50 milliGRAM(s) IntraMuscular once PRN agitation  haloperidol    Injectable 5 milliGRAM(s) IntraMuscular once PRN agitation  hydrOXYzine hydrochloride 50 milliGRAM(s) Oral every 6 hours PRN anxiety or agitation  LORazepam   Injectable 2 milliGRAM(s) IntraMuscular once PRN agitation  melatonin. 5 milliGRAM(s) Oral at bedtime PRN Insomnia  
MEDICATIONS  (STANDING):  benztropine 2 milliGRAM(s) Oral at bedtime  lithium CR (ESKALITH-CR) 900 milliGRAM(s) Oral at bedtime    MEDICATIONS  (PRN):  chlorproMAZINE    Injectable 100 milliGRAM(s) IntraMuscular every 4 hours PRN severe agitation or aggression  chlorproMAZINE    Tablet 50 milliGRAM(s) Oral every 4 hours PRN anxiety or mild agitation  chlorproMAZINE    Tablet 100 milliGRAM(s) Oral every 4 hours PRN severe agitation or aggression  diphenhydrAMINE   Injectable 50 milliGRAM(s) IntraMuscular once PRN agitation  gabapentin 400 milliGRAM(s) Oral four times a day PRN anxiety  haloperidol    Injectable 5 milliGRAM(s) IntraMuscular once PRN agitation  LORazepam     Tablet 2 milliGRAM(s) Oral every 6 hours PRN anxiety or agitation  LORazepam   Injectable 3 milliGRAM(s) IntraMuscular once PRN agitation  

## 2021-04-06 NOTE — BH INPATIENT PSYCHIATRY PROGRESS NOTE - NSTXDCOPNODATENEW_PSY_ALL_CORE
19-Feb-2021

## 2021-04-06 NOTE — BH INPATIENT PSYCHIATRY PROGRESS NOTE - MSE OPTIONS
Structured MSE

## 2021-04-06 NOTE — BH INPATIENT PSYCHIATRY PROGRESS NOTE - NSBHMETABOLICLABS_PSY_ALL_CORE
Labs within last 12 months

## 2021-04-06 NOTE — BH INPATIENT PSYCHIATRY PROGRESS NOTE - GENERAL APPEARANCE
No deformities present
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
## 2021-04-06 NOTE — BH INPATIENT PSYCHIATRY PROGRESS NOTE - NSCGIIMPROVESX_PSY_ALL_CORE
4 = No change - symptoms remain essentially unchanged

## 2021-04-06 NOTE — BH INPATIENT PSYCHIATRY PROGRESS NOTE - NSTXPROBDCSOC_PSY_ALL_CORE
DISCHARGE ISSUE - POOR SOCIALIZATION IN COMMUNITY

## 2021-04-06 NOTE — BH INPATIENT PSYCHIATRY PROGRESS NOTE - NSTXIMPULSPROGRES_PSY_ALL_CORE
No Change
Improving
No Change
No Change
Improving
No Change
No Change
Improving
No Change
Improving
No Change
Improving

## 2021-04-06 NOTE — BH INPATIENT PSYCHIATRY PROGRESS NOTE - NSBHATTESTSEENBY_PSY_A_CORE
attending Psychiatrist without NP/Trainee
NP without Attending Psychiatrist
attending Psychiatrist without NP/Trainee
attending Psychiatrist without NP/Trainee

## 2021-04-06 NOTE — BH INPATIENT PSYCHIATRY PROGRESS NOTE - NSTXDCSOCPROGRES_PSY_ALL_CORE
No Change

## 2021-04-06 NOTE — BH INPATIENT PSYCHIATRY PROGRESS NOTE - NSTXPSYCHODATETRGT_PSY_ALL_CORE
07-Apr-2021
10-Mar-2021
10-Mar-2021
07-Apr-2021
31-Mar-2021
10-Mar-2021
10-Mar-2021
31-Mar-2021
10-Mar-2021
07-Apr-2021
31-Mar-2021
10-Mar-2021
31-Mar-2021
07-Apr-2021
31-Mar-2021
10-Mar-2021
07-Apr-2021

## 2021-04-06 NOTE — BH INPATIENT PSYCHIATRY PROGRESS NOTE - NSTXCOPEGOAL_PSY_ALL_CORE
Identify and utilize 2 coping skills that meet their needs

## 2021-04-06 NOTE — BH INPATIENT PSYCHIATRY PROGRESS NOTE - NSBHMSEINTELL_PSY_A_CORE
Below Average

## 2021-04-06 NOTE — BH INPATIENT PSYCHIATRY PROGRESS NOTE - NSTXDCOPNODATETRGT_PSY_ALL_CORE
07-Apr-2021
14-Apr-2021
14-Apr-2021
05-Mar-2021
07-Apr-2021
05-Mar-2021
07-Apr-2021
14-Apr-2021
07-Apr-2021
05-Mar-2021
05-Mar-2021
07-Apr-2021
05-Mar-2021
14-Apr-2021
05-Mar-2021
05-Mar-2021
14-Apr-2021

## 2021-04-06 NOTE — BH INPATIENT PSYCHIATRY DISCHARGE NOTE - NSDCCPCAREPLAN_GEN_ALL_CORE_FT
PRINCIPAL DISCHARGE DIAGNOSIS  Diagnosis: Schizophrenia  Assessment and Plan of Treatment:       SECONDARY DISCHARGE DIAGNOSES  Diagnosis: PD (personality disorder)  Assessment and Plan of Treatment:

## 2021-04-06 NOTE — BH INPATIENT PSYCHIATRY PROGRESS NOTE - NSTXPSYCHOGOAL_PSY_ALL_CORE
Will identify 1 trigger/stressor that exacerbates hallucinations
Will identify 1 trigger/stressor that exacerbates hallucinations
Will be able to report experiencing hallucinations to staff
Will ask for PRN medication to manage hallucinations
Will identify 1 trigger/stressor that exacerbates hallucinations
Will be able to report experiencing hallucinations to staff
Will identify 1 trigger/stressor that exacerbates hallucinations
Will ask for PRN medication to manage hallucinations
Will be able to report experiencing hallucinations to staff
Will be able to report experiencing hallucinations to staff
Will ask for PRN medication to manage hallucinations
Will identify 1 trigger/stressor that exacerbates hallucinations
Will ask for PRN medication to manage hallucinations
Will ask for PRN medication to manage hallucinations
Will be able to report experiencing hallucinations to staff

## 2021-04-06 NOTE — BH INPATIENT PSYCHIATRY PROGRESS NOTE - NSTXDCSOCGOAL_PSY_ALL_CORE
Will accept referrals to support groups, clubhouse, senior centers, etc.

## 2021-04-06 NOTE — BH INPATIENT PSYCHIATRY DISCHARGE NOTE - NSBHDCMEDICALFT_PSY_A_CORE
received abilify aristada 1064 q six weeks last dose 4/6  prolixin dec 37.5mg q 3 weeks last dose 4/6

## 2021-04-06 NOTE — BH INPATIENT PSYCHIATRY PROGRESS NOTE - LEVEL OF CONSCIOUSNESS
Other
Alert
Other
Alert
Other
Alert
Alert
Other
Other
Alert
Other

## 2021-04-06 NOTE — BH INPATIENT PSYCHIATRY PROGRESS NOTE - NSTXDISORGINTERMD_PSY_ALL_CORE
Prolixin Decanoate and Aristada RENO with Lithium Eskalith CR
Prolixin Decanoate and Aristada RENO with Lithium Eskalith CR titration
Prolixin Decanoate and Aristada RENO with Lithium Eskalith CR titration
Prolixin Decanoate and Aristada RENO with Lithium Eskalith CR

## 2021-04-06 NOTE — BH INPATIENT PSYCHIATRY PROGRESS NOTE - NSTXDISORGDATEEST_PSY_ALL_CORE
24-Mar-2021
31-Mar-2021
31-Mar-2021
03-Mar-2021
24-Mar-2021
03-Mar-2021
03-Mar-2021
24-Mar-2021
31-Mar-2021
03-Mar-2021
31-Mar-2021
03-Mar-2021
03-Mar-2021
24-Mar-2021
03-Mar-2021
24-Mar-2021
31-Mar-2021

## 2021-04-06 NOTE — BH INPATIENT PSYCHIATRY PROGRESS NOTE - NSBHCONSULTIPREASON_PSY_A_CORE
danger to others; mental illness expected to respond to inpatient care

## 2021-04-06 NOTE — BH INPATIENT PSYCHIATRY PROGRESS NOTE - NSTXSUICIDPROGRES_PSY_ALL_CORE
Met - goal discontinued
Improving
Improving
Met - goal discontinued
Improving
Met - goal discontinued
Improving
Improving
Met - goal discontinued
Improving
Met - goal discontinued
Improving

## 2021-04-06 NOTE — BH INPATIENT PSYCHIATRY PROGRESS NOTE - NSTXMEDICPROGRES_PSY_ALL_CORE
Worsening
Worsening
Improving
Improving
Worsening
Improving
Worsening
Improving
Worsening

## 2021-04-06 NOTE — BH INPATIENT PSYCHIATRY PROGRESS NOTE - NSBHMSEIMPULSE_PSY_A_CORE
Normal
Impaired
Normal

## 2021-04-06 NOTE — BH INPATIENT PSYCHIATRY PROGRESS NOTE - NSTXDCHOUSGOAL_PSY_ALL_CORE
Will agree to participate in housing process
Will meet with care coordinator and accept services
Will agree to participate in housing process
Will agree to participate in housing process
Will meet with care coordinator and accept services
Will agree to participate in housing process
Will meet with care coordinator and accept services
Other...
Will agree to participate in housing process
Will agree to participate in housing process
Will meet with care coordinator and accept services
Will agree to participate in housing process
Other...
Will agree to participate in housing process
Will meet with care coordinator and accept services

## 2021-04-06 NOTE — BH INPATIENT PSYCHIATRY PROGRESS NOTE - NSTXPSYCHOINTERMD_PSY_ALL_CORE
Prolixin with Abilify and Lithobid

## 2021-04-06 NOTE — BH INPATIENT PSYCHIATRY PROGRESS NOTE - NSTXPSYCHODATEEST_PSY_ALL_CORE
31-Mar-2021
24-Mar-2021
03-Mar-2021
31-Mar-2021
24-Mar-2021
24-Mar-2021
03-Mar-2021
24-Mar-2021
03-Mar-2021
31-Mar-2021
03-Mar-2021
03-Mar-2021

## 2021-04-06 NOTE — BH INPATIENT PSYCHIATRY PROGRESS NOTE - NSTXPSYCHOPROGRES_PSY_ALL_CORE
Improving
Improving
Met - goal discontinued
Improving
Met - goal discontinued
No Change
Improving
Met - goal discontinued
No Change
Improving
Met - goal discontinued

## 2021-04-06 NOTE — BH INPATIENT PSYCHIATRY PROGRESS NOTE - NSBHMSETHTCONTENT_PSY_A_CORE
Preoccupations/Other
Preoccupations/Other
Delusions/Preoccupations/Other
Preoccupations/Other
Delusions/Preoccupations/Other
Preoccupations/Other
Delusions/Preoccupations/Other
Preoccupations/Other

## 2021-04-06 NOTE — BH INPATIENT PSYCHIATRY PROGRESS NOTE - NSTXSUICIDDATEEST_PSY_ALL_CORE
31-Mar-2021
01-Mar-2021
24-Mar-2021
24-Mar-2021
01-Mar-2021
01-Mar-2021
31-Mar-2021
01-Mar-2021
31-Mar-2021
01-Mar-2021
24-Mar-2021
31-Mar-2021
01-Mar-2021
24-Mar-2021
31-Mar-2021
24-Mar-2021
01-Mar-2021

## 2021-04-06 NOTE — BH INPATIENT PSYCHIATRY DISCHARGE NOTE - NSDCMRMEDTOKEN_GEN_ALL_CORE_FT
ARIPiprazole lauroxil 1064 mg/3.9 mL intramuscular suspension, extended release: 1064 milligram(s) intramuscular every 6 hours  benztropine 2 mg oral tablet: 1 tab(s) orally once a day (at bedtime)  fluPHENAZine: 37.5 milligram(s) intramuscular every 3 weeks  lithium 450 mg oral tablet, extended release: 2 tab(s) orally once a day (at bedtime)  LORazepam 1 mg oral tablet: 1 tab(s) orally 3 times a day for three days then 1/2 tab orally 3 times a day for three days then stop MDD:3mg

## 2021-04-06 NOTE — BH INPATIENT PSYCHIATRY PROGRESS NOTE - NSTXCOPEDATETRGT_PSY_ALL_CORE
03-Apr-2021
24-Mar-2021
06-Apr-2021
07-Apr-2021
24-Mar-2021
24-Mar-2021
07-Apr-2021
24-Mar-2021
03-Apr-2021
24-Mar-2021
07-Apr-2021
03-Apr-2021
07-Apr-2021
24-Mar-2021

## 2021-04-06 NOTE — BH INPATIENT PSYCHIATRY PROGRESS NOTE - NSTREATMENTCERTY_PSY_ALL_CORE

## 2021-04-06 NOTE — BH INPATIENT PSYCHIATRY PROGRESS NOTE - NSBHMSEMOOD_PSY_A_CORE
Normal/Anxious
Normal/Anxious
Other
Normal/Anxious
Other
Irritable
Normal/Anxious
Normal/Anxious
Irritable
Normal/Anxious
Other
Normal/Anxious/Irritable
Normal/Anxious

## 2021-04-06 NOTE — BH INPATIENT PSYCHIATRY PROGRESS NOTE - NSTXPROBCONDUC_PSY_ALL_CORE
CONDUCT PROBLEM

## 2021-04-06 NOTE — BH INPATIENT PSYCHIATRY PROGRESS NOTE - NSTXSUICIDGOAL_PSY_ALL_CORE
Talk to staff and ask for assistance when having suicidal wishes instead of acting out
Be able to state 3 reasons for living
Talk to staff and ask for assistance when having suicidal wishes instead of acting out
Be able to state 3 reasons for living
Talk to staff and ask for assistance when having suicidal wishes instead of acting out
Talk to staff and ask for assistance when having suicidal wishes instead of acting out
Be able to state 3 reasons for living
Talk to staff and ask for assistance when having suicidal wishes instead of acting out
Be able to state 3 reasons for living
Be able to state 3 reasons for living
Talk to staff and ask for assistance when having suicidal wishes instead of acting out
Be able to state 3 reasons for living
Be able to state 3 reasons for living
Talk to staff and ask for assistance when having suicidal wishes instead of acting out
Be able to state 3 reasons for living

## 2021-04-06 NOTE — BH DISCHARGE NOTE NURSING/SOCIAL WORK/PSYCH REHAB - PATIENT PORTAL LINK FT
You can access the FollowMyHealth Patient Portal offered by [facility] by registering at the following website: [email or web address]. By joining [facility]’s FollowMyHealth portal, you will also be able to view your health information using other applications (apps) compatible with our system.

## 2021-04-06 NOTE — BH INPATIENT PSYCHIATRY PROGRESS NOTE - NSTXPROBIMPULS_PSY_ALL_CORE
IMPULSIVITY/AGITATION

## 2021-04-06 NOTE — BH INPATIENT PSYCHIATRY PROGRESS NOTE - NSTXCONDUCDATEEST_PSY_ALL_CORE
24-Mar-2021
24-Mar-2021
31-Mar-2021
31-Mar-2021
24-Mar-2021
24-Mar-2021
31-Mar-2021
31-Mar-2021
24-Mar-2021
31-Mar-2021

## 2021-04-06 NOTE — BH INPATIENT PSYCHIATRY PROGRESS NOTE - NSBHMSESPEECH_PSY_A_CORE
Normal volume, rate, productivity, spontaneity and articulation
Normal volume, rate, productivity, spontaneity and articulation
Abnormal as indicated, otherwise normal...
Normal volume, rate, productivity, spontaneity and articulation
Abnormal as indicated, otherwise normal...
Normal volume, rate, productivity, spontaneity and articulation

## 2021-04-06 NOTE — BH INPATIENT PSYCHIATRY PROGRESS NOTE - NSTXPROBDCHOUS_PSY_ALL_CORE
DISCHARGE ISSUE - LACK OF APPROPRIATE HOUSING

## 2021-04-06 NOTE — BH INPATIENT PSYCHIATRY PROGRESS NOTE - OTHER
neutral grossly cooperative   pt narrative and MSE "good" avoidant themes and victimization themes;  lapses into paranoid thinking; less militaristic themes Evasive,

## 2021-04-06 NOTE — BH INPATIENT PSYCHIATRY PROGRESS NOTE - NSTXPROBDISORG_PSY_ALL_CORE
DISORGANIZATION OF THOUGHT/BEHAVIOR

## 2021-04-08 NOTE — ED BEHAVIORAL HEALTH ASSESSMENT NOTE - CURRENT INTENT:
Detail Level: Detailed Depth Of Biopsy: dermis Was A Bandage Applied: Yes Size Of Lesion In Cm: 1.2 X Size Of Lesion In Cm: 0 Biopsy Type: H and E Biopsy Method: 15 blade Anesthesia Type: 1% lidocaine with epinephrine Anesthesia Volume In Cc (Will Not Render If 0): 0.5 Hemostasis: Drysol Wound Care: Vaseline Dressing: bandage Type Of Destruction Used: Electrodesiccation Curettage Text: The wound bed was treated with curettage after the biopsy was performed. Cryotherapy Text: The wound bed was treated with cryotherapy after the biopsy was performed. Electrodesiccation Text: The wound bed was treated with electrodesiccation after the biopsy was performed. Electrodesiccation And Curettage Text: The wound bed was treated with electrodesiccation and curettage after the biopsy was performed. None known Silver Nitrate Text: The wound bed was treated with silver nitrate after the biopsy was performed. Lab: [id number] Lab Facility: [id number] Notes In Note?: No Consent: Verbal consent was obtained and risks were reviewed including but not limited to scarring, infection, bleeding, scabbing, incomplete removal, nerve damage and allergy to anesthesia. Post-Care Instructions: I reviewed with the patient in detail post-care instructions. Patient is to keep the biopsy site dry overnight, and then apply bacitracin twice daily until healed. Patient may apply hydrogen peroxide soaks to remove any crusting. Notification Instructions: Patient will be notified of biopsy results. However, patient instructed to call the office if not contacted within 2 weeks. Billing Type: Third-Party Bill Information: Selecting Yes will display possible errors in your note based on the variables you have selected. This validation is only offered as a suggestion for you. PLEASE NOTE THAT THE VALIDATION TEXT WILL BE REMOVED WHEN YOU FINALIZE YOUR NOTE. IF YOU WANT TO FAX A PRELIMINARY NOTE YOU WILL NEED TO TOGGLE THIS TO 'NO' IF YOU DO NOT WANT IT IN YOUR FAXED NOTE.

## 2021-04-11 ENCOUNTER — EMERGENCY (EMERGENCY)
Facility: HOSPITAL | Age: 46
LOS: 1 days | Discharge: OTHER INSTITUTION | End: 2021-04-11
Attending: EMERGENCY MEDICINE
Payer: MEDICARE

## 2021-04-11 VITALS
WEIGHT: 154.98 LBS | RESPIRATION RATE: 18 BRPM | DIASTOLIC BLOOD PRESSURE: 90 MMHG | SYSTOLIC BLOOD PRESSURE: 155 MMHG | HEIGHT: 68 IN | OXYGEN SATURATION: 95 % | TEMPERATURE: 98 F | HEART RATE: 102 BPM

## 2021-04-11 LAB
ALBUMIN SERPL ELPH-MCNC: 4.2 G/DL — SIGNIFICANT CHANGE UP (ref 3.3–5.2)
ALP SERPL-CCNC: 86 U/L — SIGNIFICANT CHANGE UP (ref 40–120)
ALT FLD-CCNC: 53 U/L — HIGH
ANION GAP SERPL CALC-SCNC: 16 MMOL/L — SIGNIFICANT CHANGE UP (ref 5–17)
AST SERPL-CCNC: 59 U/L — HIGH
BASOPHILS # BLD AUTO: 0.06 K/UL — SIGNIFICANT CHANGE UP (ref 0–0.2)
BASOPHILS NFR BLD AUTO: 0.6 % — SIGNIFICANT CHANGE UP (ref 0–2)
BILIRUB SERPL-MCNC: 0.4 MG/DL — SIGNIFICANT CHANGE UP (ref 0.4–2)
BUN SERPL-MCNC: 21 MG/DL — HIGH (ref 8–20)
CALCIUM SERPL-MCNC: 9.2 MG/DL — SIGNIFICANT CHANGE UP (ref 8.6–10.2)
CHLORIDE SERPL-SCNC: 102 MMOL/L — SIGNIFICANT CHANGE UP (ref 98–107)
CO2 SERPL-SCNC: 24 MMOL/L — SIGNIFICANT CHANGE UP (ref 22–29)
CREAT SERPL-MCNC: 1.14 MG/DL — SIGNIFICANT CHANGE UP (ref 0.5–1.3)
EOSINOPHIL # BLD AUTO: 0.06 K/UL — SIGNIFICANT CHANGE UP (ref 0–0.5)
EOSINOPHIL NFR BLD AUTO: 0.6 % — SIGNIFICANT CHANGE UP (ref 0–6)
ETHANOL SERPL-MCNC: <10 MG/DL — SIGNIFICANT CHANGE UP (ref 0–9)
GLUCOSE SERPL-MCNC: 159 MG/DL — HIGH (ref 70–99)
HCT VFR BLD CALC: 44.5 % — SIGNIFICANT CHANGE UP (ref 39–50)
HGB BLD-MCNC: 14.9 G/DL — SIGNIFICANT CHANGE UP (ref 13–17)
IMM GRANULOCYTES NFR BLD AUTO: 0.1 % — SIGNIFICANT CHANGE UP (ref 0–1.5)
LYMPHOCYTES # BLD AUTO: 1.63 K/UL — SIGNIFICANT CHANGE UP (ref 1–3.3)
LYMPHOCYTES # BLD AUTO: 17.2 % — SIGNIFICANT CHANGE UP (ref 13–44)
MCHC RBC-ENTMCNC: 31.2 PG — SIGNIFICANT CHANGE UP (ref 27–34)
MCHC RBC-ENTMCNC: 33.5 GM/DL — SIGNIFICANT CHANGE UP (ref 32–36)
MCV RBC AUTO: 93.3 FL — SIGNIFICANT CHANGE UP (ref 80–100)
MONOCYTES # BLD AUTO: 0.82 K/UL — SIGNIFICANT CHANGE UP (ref 0–0.9)
MONOCYTES NFR BLD AUTO: 8.7 % — SIGNIFICANT CHANGE UP (ref 2–14)
NEUTROPHILS # BLD AUTO: 6.87 K/UL — SIGNIFICANT CHANGE UP (ref 1.8–7.4)
NEUTROPHILS NFR BLD AUTO: 72.8 % — SIGNIFICANT CHANGE UP (ref 43–77)
PLATELET # BLD AUTO: 265 K/UL — SIGNIFICANT CHANGE UP (ref 150–400)
POTASSIUM SERPL-MCNC: 3.7 MMOL/L — SIGNIFICANT CHANGE UP (ref 3.5–5.3)
POTASSIUM SERPL-SCNC: 3.7 MMOL/L — SIGNIFICANT CHANGE UP (ref 3.5–5.3)
PROT SERPL-MCNC: 7.4 G/DL — SIGNIFICANT CHANGE UP (ref 6.6–8.7)
RBC # BLD: 4.77 M/UL — SIGNIFICANT CHANGE UP (ref 4.2–5.8)
RBC # FLD: 13.2 % — SIGNIFICANT CHANGE UP (ref 10.3–14.5)
SODIUM SERPL-SCNC: 142 MMOL/L — SIGNIFICANT CHANGE UP (ref 135–145)
WBC # BLD: 9.45 K/UL — SIGNIFICANT CHANGE UP (ref 3.8–10.5)
WBC # FLD AUTO: 9.45 K/UL — SIGNIFICANT CHANGE UP (ref 3.8–10.5)

## 2021-04-11 PROCEDURE — 93010 ELECTROCARDIOGRAM REPORT: CPT

## 2021-04-11 PROCEDURE — 99285 EMERGENCY DEPT VISIT HI MDM: CPT | Mod: CS

## 2021-04-11 NOTE — ED ADULT TRIAGE NOTE - CHIEF COMPLAINT QUOTE
[id number] patient was on drugs, reports he was smoking crack, has not taken any psych meds in 4 days. Has court mandated order for treatment, (AOT) brought here because [facility] is on diversion per police. Denies SI and HI. pt undressed in yellow gown, called Dr Naylor called to evaluate.,

## 2021-04-11 NOTE — ED PROVIDER NOTE - OBJECTIVE STATEMENT
44 y/o M pt with significant PMHx of Schizophrenia and Bipolar disorder presents to the ED detained by police for smoking crack/cocaine on AOT (Assisted Outpatient Treatment). As per triage, a concerned citizen called the police with concerns that the patient is doing drugs. Pt admits to smoking crack/cocaine for the pat 4-5 days, and has not been compliant with his psych meds since he states his memory is weak.

## 2021-04-11 NOTE — ED PROVIDER NOTE - CLINICAL SUMMARY MEDICAL DECISION MAKING FREE TEXT BOX
Pt admits to smoking crack/cocaine, non-compliant with psych meds, will checks labs and EKG for behavioral clearance, and tele psych when he is medically cleared

## 2021-04-12 DIAGNOSIS — F14.10 COCAINE ABUSE, UNCOMPLICATED: ICD-10-CM

## 2021-04-12 DIAGNOSIS — F20.9 SCHIZOPHRENIA, UNSPECIFIED: ICD-10-CM

## 2021-04-12 LAB
AMPHET UR-MCNC: NEGATIVE — SIGNIFICANT CHANGE UP
APPEARANCE UR: CLEAR — SIGNIFICANT CHANGE UP
BACTERIA # UR AUTO: ABNORMAL
BARBITURATES UR SCN-MCNC: NEGATIVE — SIGNIFICANT CHANGE UP
BENZODIAZ UR-MCNC: NEGATIVE — SIGNIFICANT CHANGE UP
BILIRUB UR-MCNC: ABNORMAL
COCAINE METAB.OTHER UR-MCNC: POSITIVE
COLOR SPEC: YELLOW — SIGNIFICANT CHANGE UP
COVID-19 SPIKE DOMAIN AB INTERP: POSITIVE
COVID-19 SPIKE DOMAIN ANTIBODY RESULT: 53.1 U/ML — HIGH
DIFF PNL FLD: NEGATIVE — SIGNIFICANT CHANGE UP
EPI CELLS # UR: SIGNIFICANT CHANGE UP
GLUCOSE UR QL: NEGATIVE MG/DL — SIGNIFICANT CHANGE UP
KETONES UR-MCNC: ABNORMAL
LEUKOCYTE ESTERASE UR-ACNC: ABNORMAL
METHADONE UR-MCNC: NEGATIVE — SIGNIFICANT CHANGE UP
NITRITE UR-MCNC: NEGATIVE — SIGNIFICANT CHANGE UP
OPIATES UR-MCNC: NEGATIVE — SIGNIFICANT CHANGE UP
PCP SPEC-MCNC: SIGNIFICANT CHANGE UP
PCP UR-MCNC: NEGATIVE — SIGNIFICANT CHANGE UP
PH UR: 6.5 — SIGNIFICANT CHANGE UP (ref 5–8)
PROT UR-MCNC: 15 MG/DL
RBC CASTS # UR COMP ASSIST: NEGATIVE /HPF — SIGNIFICANT CHANGE UP (ref 0–4)
SARS-COV-2 IGG+IGM SERPL QL IA: 53.1 U/ML — HIGH
SARS-COV-2 IGG+IGM SERPL QL IA: POSITIVE
SARS-COV-2 RNA SPEC QL NAA+PROBE: SIGNIFICANT CHANGE UP
SP GR SPEC: 1.01 — SIGNIFICANT CHANGE UP (ref 1.01–1.02)
THC UR QL: POSITIVE
UROBILINOGEN FLD QL: 8 MG/DL
WBC UR QL: SIGNIFICANT CHANGE UP

## 2021-04-12 PROCEDURE — 99218: CPT | Mod: CS

## 2021-04-12 PROCEDURE — U0005: CPT

## 2021-04-12 PROCEDURE — U0003: CPT

## 2021-04-12 PROCEDURE — 81001 URINALYSIS AUTO W/SCOPE: CPT

## 2021-04-12 PROCEDURE — 80053 COMPREHEN METABOLIC PANEL: CPT

## 2021-04-12 PROCEDURE — 36415 COLL VENOUS BLD VENIPUNCTURE: CPT

## 2021-04-12 PROCEDURE — 99285 EMERGENCY DEPT VISIT HI MDM: CPT | Mod: 25

## 2021-04-12 PROCEDURE — 93005 ELECTROCARDIOGRAM TRACING: CPT

## 2021-04-12 PROCEDURE — 99284 EMERGENCY DEPT VISIT MOD MDM: CPT

## 2021-04-12 PROCEDURE — 96372 THER/PROPH/DIAG INJ SC/IM: CPT

## 2021-04-12 PROCEDURE — 86769 SARS-COV-2 COVID-19 ANTIBODY: CPT

## 2021-04-12 PROCEDURE — G0378: CPT

## 2021-04-12 PROCEDURE — 80307 DRUG TEST PRSMV CHEM ANLYZR: CPT

## 2021-04-12 PROCEDURE — 85025 COMPLETE CBC W/AUTO DIFF WBC: CPT

## 2021-04-12 RX ORDER — HYDROXYZINE HCL 10 MG
50 TABLET ORAL EVERY 6 HOURS
Refills: 0 | Status: DISCONTINUED | OUTPATIENT
Start: 2021-04-12 | End: 2021-04-16

## 2021-04-12 RX ORDER — FLUPHENAZINE HYDROCHLORIDE 1 MG/1
5 TABLET, FILM COATED ORAL EVERY 8 HOURS
Refills: 0 | Status: DISCONTINUED | OUTPATIENT
Start: 2021-04-12 | End: 2021-04-16

## 2021-04-12 RX ORDER — FLUPHENAZINE HYDROCHLORIDE 1 MG/1
5 TABLET, FILM COATED ORAL
Refills: 0 | Status: DISCONTINUED | OUTPATIENT
Start: 2021-04-12 | End: 2021-04-16

## 2021-04-12 RX ADMIN — Medication 2 MILLIGRAM(S): at 00:08

## 2021-04-12 RX ADMIN — FLUPHENAZINE HYDROCHLORIDE 5 MILLIGRAM(S): 1 TABLET, FILM COATED ORAL at 11:59

## 2021-04-12 NOTE — ED CDU PROVIDER INITIAL DAY NOTE - OBJECTIVE STATEMENT
46 y/o M pt with significant PMHx of Schizophrenia and Bipolar disorder presents to the ED detained by police for smoking crack/cocaine on AOT (Assisted Outpatient Treatment). As per triage, a concerned citizen called the police with concerns that the patient is doing drugs. Pt admits to smoking crack/cocaine for the pat 4-5 days, and has not been compliant with his psych meds since he states his memory is weak.

## 2021-04-12 NOTE — ED ADULT NURSE NOTE - OBJECTIVE STATEMENT
patient states that he was brought here by the police and that they are fabrication things to get him in trouble.  Pt with poor hygiene, poor eye contact and appears to be paranoid of the environment.  Pt cautious of men and ask that they leave the room while being interviewed

## 2021-04-12 NOTE — ED ADULT NURSE NOTE - NSIMPLEMENTINTERV_GEN_ALL_ED
Implemented All Universal Safety Interventions:  [place] to call system. Call bell, personal items and telephone within reach. Instruct patient to call for assistance. Room bathroom lighting operational. Non-slip footwear when patient is off stretcher. Physically safe environment: no spills, clutter or unnecessary equipment. Stretcher in lowest position, wheels locked, appropriate side rails in place.
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
## 2021-04-12 NOTE — ED ADULT NURSE NOTE - ACTIVATING EVENTS/STRESSORS
Triggering events leading to humiliation, shame, and/or despair (e.g. Loss of relationship, financial or health status) (real or anticipated)/Non-compliant or not receiving treatment/Change in provider or treatment (i.e., medications, psychotherapy, milieu)

## 2021-04-12 NOTE — ED BEHAVIORAL HEALTH ASSESSMENT NOTE - OTHER PAST PSYCHIATRIC HISTORY (INCLUDE DETAILS REGARDING ONSET, COURSE OF ILLNESS, INPATIENT/OUTPATIENT TREATMENT)
schizophrenia, 40+ prior hospitalizations, on AOT followed by ACT team, no known suicide attempts; h/o multiple arrests and multiple instances of aggression; h/o cocaine (crack) and cannabis use disorders,

## 2021-04-12 NOTE — ED BEHAVIORAL HEALTH ASSESSMENT NOTE - RISK ASSESSMENT
Unable to determine Suicide Risk Elevated baseline risk in setting of chronic psychotic disorder. Risk factors include male sex, suspected treatment nonadherence, history of substance use, unstable domicile, poor social supports. Unable to identify protective factors at this time.

## 2021-04-12 NOTE — ED ADULT NURSE NOTE - CAS TRG GEN SKIN COLOR
From: Beverly Hurd  To:  Nikky Joya MD  Sent: 12/21/2017 9:54 AM CST  Subject: UPDATE RECORDS - IMMUNIZATIONS     FLUZONE HIGH-DOSE 2017-18 SYR Received 10/10/2017  pneumovax 23 syringe Received 10/25/17   @ [facility] pharmacy [address] [place] [address] [phone] Normal for race
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
## 2021-04-12 NOTE — ED ADULT NURSE NOTE - CHIEF COMPLAINT QUOTE
[id number] patient was on drugs, reports he was smoking crack, has not taken any psych meds in 4 days. Has court mandated order for treatment, (AOT) brought here because [facility] is on diversion per police. Denies SI and HI. pt undressed in yellow gown, called Dr Naylor called to evaluate.,

## 2021-04-13 PROCEDURE — 99226: CPT | Mod: CS

## 2021-04-13 PROCEDURE — 99213 OFFICE O/P EST LOW 20 MIN: CPT

## 2021-04-13 NOTE — ED BEHAVIORAL HEALTH NOTE - BEHAVIORAL HEALTH NOTE
PROGRESS NOTE: 21 @ 13:27  	  • Reason for Ongoing Consultation: 	    ID: 45yyo Male with HEALTH ISSUES - PROBLEM Dx:  Schizophrenic disorder  Cocaine abuse    INTERVAL DATA:   • Interval Chief Complaint: "Fine"  • Interval History: pt seen for f/u. Patient ambulating and disgruntled about care. No acute anger or outburst. Eating and sleeping well.    REVIEW OF SYSTEMS:   • Constitutional Symptoms	No complaints  • Eyes	No complaints  • Ears / Nose / Throat / Mouth	No complaints  • Cardiovascular	No complaints  • Respiratory	No complaints  • Gastrointestinal	No complaints  • Genitourinary	No complaints  • Musculoskeletal	No complaints  • Skin	No complaints  • Neurological	No complaints  • Psychiatric (see HPI)	See HPI  • Endocrine	No complaints  • Hematologic / Lymphatic	No complaints  • Allergic / Immunologic	No complaints    REVIEW OF VITALS/LABS/IMAGING/INVESTIGATIONS:   • Vital signs reviewed: Yes  • Vital Signs:	    T(C): 36.7 (21 @ 10:52), Max: 37.1 (21 @ 16:14)  HR: 78 (21 @ 10:52) (61 - 78)  BP: 130/77 (21 @ 10:52) (130/77 - 161/82)  RR: 18 (21 @ 10:52) (18 - 20)  SpO2: 95% (21 @ 10:52) (95% - 96%)    • Available labs reviewed: Yes  • Available Lab Results:                           14.9   9.45  )-----------( 265      ( 2021 23:06 )             44.5     04-    142  |  102  |  21.0<H>  ----------------------------<  159<H>  3.7   |  24.0  |  1.14    Ca    9.2      2021 23:06    TPro  7.4  /  Alb  4.2  /  TBili  0.4  /  DBili  x   /  AST  59<H>  /  ALT  53<H>  /  AlkPhos  86  0411    LIVER FUNCTIONS - ( 2021 23:06 )  Alb: 4.2 g/dL / Pro: 7.4 g/dL / ALK PHOS: 86 U/L / ALT: 53 U/L / AST: 59 U/L / GGT: x             Urinalysis Basic - ( 2021 10:28 )    Color: Yellow / Appearance: Clear / S.015 / pH: x  Gluc: x / Ketone: Trace  / Bili: Small / Urobili: 8 mg/dL   Blood: x / Protein: 15 mg/dL / Nitrite: Negative   Leuk Esterase: Trace / RBC: Negative /HPF / WBC 0-2   Sq Epi: x / Non Sq Epi: Occasional / Bacteria: Occasional    MEDICATIONS:      PRN Medications:  • PRN Medications since last evaluation	  • PRN Details	    Current Medications:   fluPHENAZine 5 milliGRAM(s) Oral two times a day  fluPHENAZine  Injectable 5 milliGRAM(s) IntraMuscular every 8 hours PRN  hydrOXYzine hydrochloride 50 milliGRAM(s) Oral every 6 hours PRN     Medication Side Effects:  • Medication Side Effects or Adverse Reactions (new or ongoing)	None known    MENTAL STATUS EXAM:    Mental Status Exam:  · General Appearance	No deformities present  · Body Habitus	Average build  · Hygiene	Poor  · Grooming	Poor  · Behavior	Uncooperative, Hostile  · Eye Contact	Poor  · Relatedness	Poor  · Impulse Control	Impaired  · Muscle Tone / Strength	Other  · Other	unable to assess, pt in bed  · Abnormal Movements	No abnormal movements  · Gait / Station	Other  · Other	unable to assess, pt in bed  · Speech	Abnormal as indicated, otherwise normal...  · Speech Abnormality	Soft volume, Slowed rate, Increased latency  · Reported mood	Irritable  Angry  · Affect Quality	Elevated  Irritable  Anxious  · Affect Range	Constricted  · Affect Congruence	Congruent  · Thought Process	Disorganized/ Tangential  · Thought Associations	Loose  · Thought Content	Delusions  · Perceptions	Visual hallucinations  · Orientation	Oriented to time, place, person, situation  · Attention / Concentration	Normal  · Estimated Intelligence	Other  · Other	unable to assess  · Recent Memory	Impaired  · Remote Memory	Impaired  · Fund of Knowledge	Other  · Other	unable to assess  · Language	No abnormalities noted  · Judgment (regarding everyday events)	Poor  · Insight (regarding psychiatric illness)	Poor    SUICIDALITY:   • Suicidality (Interval)	none known    HOMICIDALITY/AGGRESSION:   • Homicidality/Aggression	aggressive toward staff with loud voice. confrontational.    DIAGNOSIS DSM-V:    Psychiatric Diagnosis (Corresponds to DSM-IV Axis I, II):   HEALTH ISSUES - PROBLEM Dx:  Schizophrenic disorder  Cocaine abuse     Medical Diagnosis (Corresponds to DSM-IV Axis III):  • Axis III	      ASSESSMENT OF CURRENT CONDITION:   Summary (include case differential, formulation and patient response to therapy): Patient still high threat to safety of staff. Confrontational with RN.    Risk Assessment (consider static vs modifiable risk factors and protective factors; comment on level of risk for dangerous behavior):     PLAN: Pending transfer to inpatient psych facility. PROGRESS NOTE: 21 @ 13:27  	  • Reason for Ongoing Consultation: 	    ID: 45yyo Male with HEALTH ISSUES - PROBLEM Dx:  Schizophrenic disorder  Cocaine abuse    INTERVAL DATA:   • Interval Chief Complaint: "Fine"  • Interval History: pt seen for f/u. Patient ambulating and disgruntled about care. No acute anger or outburst. Eating and sleeping well.  non comp[liant w meds    REVIEW OF SYSTEMS:   • Constitutional Symptoms	No complaints  • Eyes	No complaints  • Ears / Nose / Throat / Mouth	No complaints  • Cardiovascular	No complaints  • Respiratory	No complaints  • Gastrointestinal	No complaints  • Genitourinary	No complaints  • Musculoskeletal	No complaints  • Skin	No complaints  • Neurological	No complaints  • Psychiatric (see HPI)	See HPI  • Endocrine	No complaints  • Hematologic / Lymphatic	No complaints  • Allergic / Immunologic	No complaints    REVIEW OF VITALS/LABS/IMAGING/INVESTIGATIONS:   • Vital signs reviewed: Yes  • Vital Signs:	    T(C): 36.7 (21 @ 10:52), Max: 37.1 (21 @ 16:14)  HR: 78 (21 @ 10:52) (61 - 78)  BP: 130/77 (21 @ 10:52) (130/77 - 161/82)  RR: 18 (21 @ 10:52) (18 - 20)  SpO2: 95% (21 @ 10:52) (95% - 96%)    • Available labs reviewed: Yes  • Available Lab Results:                           14.9   9.45  )-----------( 265      ( 2021 23:06 )             44.5         142  |  102  |  21.0<H>  ----------------------------<  159<H>  3.7   |  24.0  |  1.14    Ca    9.2      2021 23:06    TPro  7.4  /  Alb  4.2  /  TBili  0.4  /  DBili  x   /  AST  59<H>  /  ALT  53<H>  /  AlkPhos  86  04-11    LIVER FUNCTIONS - ( 2021 23:06 )  Alb: 4.2 g/dL / Pro: 7.4 g/dL / ALK PHOS: 86 U/L / ALT: 53 U/L / AST: 59 U/L / GGT: x             Urinalysis Basic - ( 2021 10:28 )    Color: Yellow / Appearance: Clear / S.015 / pH: x  Gluc: x / Ketone: Trace  / Bili: Small / Urobili: 8 mg/dL   Blood: x / Protein: 15 mg/dL / Nitrite: Negative   Leuk Esterase: Trace / RBC: Negative /HPF / WBC 0-2   Sq Epi: x / Non Sq Epi: Occasional / Bacteria: Occasional    MEDICATIONS:      PRN Medications:  • PRN Medications since last evaluation	  • PRN Details	    Current Medications:   fluPHENAZine 5 milliGRAM(s) Oral two times a day  fluPHENAZine  Injectable 5 milliGRAM(s) IntraMuscular every 8 hours PRN  hydrOXYzine hydrochloride 50 milliGRAM(s) Oral every 6 hours PRN     Medication Side Effects:  • Medication Side Effects or Adverse Reactions (new or ongoing)	None known    MENTAL STATUS EXAM:    Mental Status Exam:  · General Appearance	No deformities present  · Body Habitus	Average build  · Hygiene	Poor  · Grooming	Poor  · Behavior	Uncooperative, Hostile  · Eye Contact	Poor  · Relatedness	Poor  · Impulse Control	Impaired  · Muscle Tone / Strength	Other  · Other	unable to assess, pt in bed  · Abnormal Movements	No abnormal movements  · Gait / Station	Other  · Other	unable to assess, pt in bed  · Speech	Abnormal as indicated, otherwise normal...  · Speech Abnormality	Soft volume, Slowed rate, Increased latency  · Reported mood	Irritable  Angry  · Affect Quality	Elevated  Irritable  Anxious  · Affect Range	Constricted  · Affect Congruence	Congruent  · Thought Process	Disorganized/ Tangential  · Thought Associations	Loose  · Thought Content	Delusions  · Perceptions	Visual hallucinations  · Orientation	Oriented to time, place, person, situation  · Attention / Concentration	Normal  · Estimated Intelligence	Other  · Other	unable to assess  · Recent Memory	Impaired  · Remote Memory	Impaired  · Fund of Knowledge	Other  · Other	unable to assess  · Language	No abnormalities noted  · Judgment (regarding everyday events)	Poor  · Insight (regarding psychiatric illness)	Poor    SUICIDALITY:   • Suicidality (Interval)	none known    HOMICIDALITY/AGGRESSION:   • Homicidality/Aggression	aggressive toward staff with loud voice. confrontational.    DIAGNOSIS DSM-V:    Psychiatric Diagnosis (Corresponds to DSM-IV Axis I, II):   HEALTH ISSUES - PROBLEM Dx:  Schizophrenic disorder  Cocaine abuse     Medical Diagnosis (Corresponds to DSM-IV Axis III):  • Axis III	      ASSESSMENT OF CURRENT CONDITION:   Summary (include case differential, formulation and patient response to therapy): Patient still high threat to safety of staff. Confrontational with RN.    Risk Assessment (consider static vs modifiable risk factors and protective factors; comment on level of risk for dangerous behavior):     PLAN: Pending transfer to inpatient psych facility.

## 2021-04-13 NOTE — ED ADULT NURSE REASSESSMENT NOTE - DESCRIPTION
received report.  received pt in room laying in bed states feeling tired not sleeping well. pt cooperative at this time with vs.

## 2021-04-14 ENCOUNTER — INPATIENT (INPATIENT)
Facility: HOSPITAL | Age: 46
LOS: 26 days | Discharge: HOME | End: 2021-05-11
Attending: PSYCHIATRY & NEUROLOGY | Admitting: PSYCHIATRY & NEUROLOGY
Payer: MEDICARE

## 2021-04-14 VITALS
SYSTOLIC BLOOD PRESSURE: 129 MMHG | RESPIRATION RATE: 18 BRPM | HEART RATE: 72 BPM | DIASTOLIC BLOOD PRESSURE: 82 MMHG | TEMPERATURE: 98 F | OXYGEN SATURATION: 96 %

## 2021-04-14 VITALS — TEMPERATURE: 98 F | RESPIRATION RATE: 18 BRPM | HEIGHT: 68 IN | WEIGHT: 154.32 LBS

## 2021-04-14 DIAGNOSIS — F14.90 COCAINE USE, UNSPECIFIED, UNCOMPLICATED: ICD-10-CM

## 2021-04-14 DIAGNOSIS — F20.0 PARANOID SCHIZOPHRENIA: ICD-10-CM

## 2021-04-14 PROCEDURE — 99222 1ST HOSP IP/OBS MODERATE 55: CPT

## 2021-04-14 PROCEDURE — 99212 OFFICE O/P EST SF 10 MIN: CPT

## 2021-04-14 PROCEDURE — 99217: CPT | Mod: CS

## 2021-04-14 RX ORDER — FLUPHENAZINE HYDROCHLORIDE 1 MG/1
5 TABLET, FILM COATED ORAL
Refills: 0 | Status: DISCONTINUED | OUTPATIENT
Start: 2021-04-14 | End: 2021-04-26

## 2021-04-14 RX ORDER — HALOPERIDOL DECANOATE 100 MG/ML
5 INJECTION INTRAMUSCULAR EVERY 4 HOURS
Refills: 0 | Status: DISCONTINUED | OUTPATIENT
Start: 2021-04-14 | End: 2021-05-11

## 2021-04-14 RX ORDER — HALOPERIDOL DECANOATE 100 MG/ML
5 INJECTION INTRAMUSCULAR ONCE
Refills: 0 | Status: DISCONTINUED | OUTPATIENT
Start: 2021-04-14 | End: 2021-05-11

## 2021-04-14 RX ORDER — DIPHENHYDRAMINE HCL 50 MG
50 CAPSULE ORAL EVERY 4 HOURS
Refills: 0 | Status: DISCONTINUED | OUTPATIENT
Start: 2021-04-14 | End: 2021-05-11

## 2021-04-14 RX ORDER — DIPHENHYDRAMINE HCL 50 MG
50 CAPSULE ORAL ONCE
Refills: 0 | Status: DISCONTINUED | OUTPATIENT
Start: 2021-04-14 | End: 2021-05-11

## 2021-04-14 RX ADMIN — Medication 2 MILLIGRAM(S): at 18:17

## 2021-04-14 NOTE — ED ADULT NURSE REASSESSMENT NOTE - GENERAL PATIENT STATE
comfortable appearance/cooperative/family/SO at bedside/resting/sleeping comfortable appearance/cooperative/no change observed/resting/sleeping
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
## 2021-04-14 NOTE — ED CDU PROVIDER SUBSEQUENT DAY NOTE - NEUROLOGICAL, MLM
Alert and oriented, no focal deficits, no motor or sensory deficits.
no focal deficits, no motor or sensory deficits.
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
## 2021-04-14 NOTE — BH INPATIENT PSYCHIATRY ASSESSMENT NOTE - NSBHMETABOLIC_PSY_ALL_CORE_FT
BMI: BMI (kg/m2): 23.6 (04-11-21 @ 22:39)  HbA1c: A1C with Estimated Average Glucose Result: 6.0 % (02-09-21 @ 10:10)    Glucose:   BP: --  Lipid Panel: Date/Time: 02-09-21 @ 10:10  Cholesterol, Serum: 264  Direct LDL: --  HDL Cholesterol, Serum: 84  Total Cholesterol/HDL Ration Measurement: --  Triglycerides, Serum: 130

## 2021-04-14 NOTE — ED CDU PROVIDER DISPOSITION NOTE - CLINICAL COURSE
patient medical cleared; eval by psych and needs inpatient psych for treatment; to be transferred to [facility]

## 2021-04-14 NOTE — ED ADULT NURSE REASSESSMENT NOTE - CONDITION
unchanged

## 2021-04-14 NOTE — CHART NOTE - NSCHARTNOTEFT_GEN_A_CORE
Screening Medical Evaluation  Patient Admitted from: [facility] admitting diagnosis: Paranoid schizophrenia    PAST MEDICAL & SURGICAL HISTORY:  No pertinent past medical history    No significant past surgical history          Allergies    No Known Allergies    Intolerances        Social History:     FAMILY HISTORY:  No pertinent family history in first degree relatives        MEDICATIONS  (STANDING):  fluPHENAZine 5 milliGRAM(s) Oral two times a day    MEDICATIONS  (PRN):  diphenhydrAMINE 50 milliGRAM(s) Oral every 4 hours PRN agitation  diphenhydrAMINE   Injectable 50 milliGRAM(s) IntraMuscular once PRN severe agitation  haloperidol     Tablet 5 milliGRAM(s) Oral every 4 hours PRN agitation  haloperidol    Injectable 5 milliGRAM(s) IntraMuscular once PRN severe agitation  LORazepam     Tablet 2 milliGRAM(s) Oral every 4 hours PRN anxiety/agitation  LORazepam   Injectable 2 milliGRAM(s) IntraMuscular once PRN severe agitation      Vital Signs Last 24 Hrs  T(C): 36.7 (14 Apr 2021 20:19), Max: 36.8 (14 Apr 2021 13:47)  T(F): 98 (14 Apr 2021 20:19), Max: 98.2 (14 Apr 2021 13:47)  HR: 72 (14 Apr 2021 13:47) (72 - 72)  BP: 129/82 (14 Apr 2021 13:47) (129/82 - 129/82)  BP(mean): --  RR: 18 (14 Apr 2021 14:50) (18 - 18)  SpO2: 96% (14 Apr 2021 13:47) (96% - 96%)  CAPILLARY BLOOD GLUCOSE            PHYSICAL EXAM:  GENERAL: NAD, well-developed  HEAD:  Atraumatic, Normocephalic  EYES: EOMI, PERRLA, conjunctiva and sclera clear  NECK: Supple, No JVD  CHEST/LUNG: Clear to auscultation bilaterally; No wheeze  HEART: Regular rate and rhythm; No murmurs, rubs, or gallops  ABDOMEN: Soft, Nontender, Nondistended; Bowel sounds present  EXTREMITIES:  2+ Peripheral Pulses, No clubbing, cyanosis, or edema  PSYCH: AAOx3  NEUROLOGY: non-focal  SKIN: No rashes or lesions    LABS:                    RADIOLOGY & ADDITIONAL TESTS:    Assessment and Plan:  45y yo Male with no significant PMH is admitted to [facility] with a primary psychiatric diagnosis of Paranoid schizophrenia. The pt currently denies having any medical complaints such as chest pain, sob, abdominal pain, n/v/d/c, or any problems with urination or bowel movements. The rest of his screening physical is unremarkable.    1.Paranoid schizophrenia-Plan: continue with meds as per primary psychiatric team

## 2021-04-14 NOTE — ED ADULT NURSE REASSESSMENT NOTE - COMFORT CARE
ambulated to bathroom/darkened lights
ambulated to bathroom/darkened lights
meal provided/plan of care explained
meal provided/po fluids offered
plan of care explained
darkened lights
darkened lights
darkened lights/meal provided
snacks provided/darkened lights/po fluids offered
plan of care explained

## 2021-04-14 NOTE — BH INPATIENT PSYCHIATRY ASSESSMENT NOTE - NSBHCHARTREVIEWVS_PSY_A_CORE FT
Vital Signs Last 24 Hrs  T(C): 36.8 (14 Apr 2021 13:47), Max: 36.8 (13 Apr 2021 15:30)  T(F): 98.2 (14 Apr 2021 13:47), Max: 98.2 (13 Apr 2021 15:30)  HR: 72 (14 Apr 2021 13:47) (67 - 86)  BP: 129/82 (14 Apr 2021 13:47) (127/83 - 147/80)  BP(mean): --  RR: 18 (14 Apr 2021 13:47) (18 - 18)  SpO2: 96% (14 Apr 2021 13:47) (96% - 96%)
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
## 2021-04-14 NOTE — BH PATIENT PROFILE - HOME MEDICATIONS
ARIPiprazole lauroxil 1064 mg/3.9 mL intramuscular suspension, extended release , 1064 milligram(s) intramuscular every 6 hours  LORazepam 1 mg oral tablet , 1 tab(s) orally 3 times a day for three days then 1/2 tab orally 3 times a day for three days then stop MDD:3mg  lithium 450 mg oral tablet, extended release , 2 tab(s) orally once a day (at bedtime)  fluPHENAZine , 37.5 milligram(s) intramuscular every 3 weeks  benztropine 2 mg oral tablet , 1 tab(s) orally once a day (at bedtime)

## 2021-04-14 NOTE — BH INPATIENT PSYCHIATRY ASSESSMENT NOTE - RISK ASSESSMENT
Elevated baseline risk in setting of chronic psychotic disorder. Risk factors include male sex, suspected treatment nonadherence, history of substance use, unstable domicile, poor social supports. Unable to identify protective factors at this time.

## 2021-04-14 NOTE — ED ADULT NURSE REASSESSMENT NOTE - STATUS
awaiting transfer, no change
awaiting transfer, no change
awaiting consult
awaiting consult
awaiting transfer, no change

## 2021-04-14 NOTE — ED CDU PROVIDER SUBSEQUENT DAY NOTE - ENMT, MLM
Airway patent. Nasal mucosa clear. Mouth with normal mucosa.
Airway patent. Nasal mucosa clear. Mouth with normal mucosa. Throat has no vesicles, no oropharyngeal exudates and uvula is midline.

## 2021-04-14 NOTE — ED ADULT NURSE REASSESSMENT NOTE - NS ED NURSE REASSESS COMMENT FT1
Patient ate 100% of his lunch.  Patient hyperverbal during interactions.  Patient verbalizing belief cigarettes heal him and if he owned his own home he would not be hospitalized.  No attempts to harm self or others.  Safety of patient maintained.
Patient offered Prolixin as ordered and refused.  Patient provided education and encouragement to comply but continued to refuse stating "Its not good for my brain" and ceased interaction by pulling covers over his head.  No attempts to harm self or others and safety maintained.
Patient resting in bed, awake, alert and oriented X4, calm and cooperative, resp even/unlabored, awaiting for transfer, safety maintained, will continue to monitor patient.
Pt rec'd at this time. patient aware of transfer to [facility]. patient tolerated lunch well nad noted will monitor and chart changes
Verbal report given to Justen on Low 4 [phone] Awaiting on ambulance ti arrive
patient refused 6 am meds
patient tolerated breakfast awaiting on final dispo
Assumed care of patient. Pt awaiting on breakfast try.  NAD noted will monitor and chart changes and maintain safe environment
pt sexually preoccupied and inappropriate.
Assumed care of pt @1930. Pt denies any SI, or any A/V/H. pt states he hasn't slept in 5 days and would like to be on medication to help him feel better. pt states he feels like a failure. emotional support provided to pt. will continue to monitor the pt for safety.
Assumed care of patient at 0720.  Patient resting in bed.  Patient oriented to staff and educated about plan of care.  Patient refused to have vital sign assessment.  No distress noted or complaints made by patient.  No attempts to harm self or others and safety maintained.
Patient ate 100% of meals offered.  Patient withdrawn to bed.  Patient mood is labile when awake.  Patient reportedly kicked at staff during verbal interaction with no further attempts to harm self or others.  Safety maintained.
Patient has been calm and resting in bed.  Patient out of room to bathroom as needed.  No attempts to harm self or others.  Safety of patient maintained.
Assumed care of patient at 0720.  Patient resting in bed appears to be sleeping with no distress.  Safety of patient maintained.
Patient ate 100% of his breakfast.  Patient verbalized understanding of plan for transfer and verbalized desire to good to [facility] if possible.  No attempts to harm self or others and safety maintained.
Patient ate 100% of his dinner.  Patient showered independently.  No attempts to harm self or others and safety maintained.
Patient resting in bed, out of room to bathroom as needed.  No attempts to harm self or others and safety maintained.

## 2021-04-14 NOTE — CHART NOTE - NSCHARTNOTEFT_GEN_A_CORE
JULIO Note: Plan is to transfer pt for inpt psychiatric care. Multiple psych hospitalizations this year, [facility] 1/22/21-3/5/21 and 3/16/21-4/6/21. Pt is on AOT and followed by an ACT team. Dr. Jung spoke with ACT and they stated that [facility] had made a PPC application but pt was released by a  after requesting his D/C with their mental hygiene . Called [facility] intake [phone]. They currently do not have an open male bed but will call  if one opens today. NO available beds at [facility]. Will try to transfer to [facility] for continued care since they are familiar with his MH tx needs.
JULIO Note: Plan is to transfer pt for inpt psychiatric care. pt has exhausted his medicare benefits so secondary will be billed for placement, straight medicaid. No male beds @ [facility] ( spoke with Chelsea [phone]. ) Due to ins pt can not be referred to [facility] or [facility]. Called [facility], spoke with Carmenza, no beds at this time. Too late for a referral to [facility] or [place] if bed available due to their admit cutoff time.   Will continue to explore transfer options
SW Note: SW placed call to [facility] (Nina), [facility] (FANY Smith) and [place] (Kenia) for evening bed availability. No beds available at this time. SW following for inpt trx. when bed opens, preferably to [facility] as pt was recently d/c from there.
JULIO Note: Referral made to [facility] for inpt psych. Spoke with Nimesh in admissions [phone]. Chart pending MD review. Can not refer pt to [facility] or [place] due to ins. JULIO to follow

## 2021-04-14 NOTE — ED CDU PROVIDER SUBSEQUENT DAY NOTE - CARDIAC, MLM
Normal rate, regular rhythm.  Heart sounds S1, S2.
Normal rate, regular rhythm.  Heart sounds S1, S2.  No murmurs, rubs or gallops.

## 2021-04-14 NOTE — BH INPATIENT PSYCHIATRY ASSESSMENT NOTE - CURRENT MEDICATION
MEDICATIONS  (STANDING):  fluPHENAZine 5 milliGRAM(s) Oral two times a day    MEDICATIONS  (PRN):  diphenhydrAMINE 50 milliGRAM(s) Oral every 4 hours PRN agitation  diphenhydrAMINE   Injectable 50 milliGRAM(s) IntraMuscular once PRN severe agitation  haloperidol     Tablet 5 milliGRAM(s) Oral every 4 hours PRN agitation  haloperidol    Injectable 5 milliGRAM(s) IntraMuscular once PRN severe agitation  LORazepam     Tablet 2 milliGRAM(s) Oral every 4 hours PRN anxiety/agitation  LORazepam   Injectable 2 milliGRAM(s) IntraMuscular once PRN severe agitation

## 2021-04-15 PROCEDURE — 99232 SBSQ HOSP IP/OBS MODERATE 35: CPT

## 2021-04-15 RX ORDER — CHLORPROMAZINE HCL 10 MG
50 TABLET ORAL EVERY 4 HOURS
Refills: 0 | Status: DISCONTINUED | OUTPATIENT
Start: 2021-04-15 | End: 2021-05-11

## 2021-04-15 RX ORDER — LITHIUM CARBONATE 300 MG/1
450 TABLET, EXTENDED RELEASE ORAL
Refills: 0 | Status: DISCONTINUED | OUTPATIENT
Start: 2021-04-15 | End: 2021-04-29

## 2021-04-15 RX ORDER — GABAPENTIN 400 MG/1
400 CAPSULE ORAL THREE TIMES A DAY
Refills: 0 | Status: DISCONTINUED | OUTPATIENT
Start: 2021-04-15 | End: 2021-05-11

## 2021-04-15 RX ORDER — BENZTROPINE MESYLATE 1 MG
2 TABLET ORAL AT BEDTIME
Refills: 0 | Status: DISCONTINUED | OUTPATIENT
Start: 2021-04-15 | End: 2021-05-11

## 2021-04-15 RX ORDER — CHLORPROMAZINE HCL 10 MG
50 TABLET ORAL ONCE
Refills: 0 | Status: DISCONTINUED | OUTPATIENT
Start: 2021-04-15 | End: 2021-05-11

## 2021-04-15 RX ADMIN — Medication 2 MILLIGRAM(S): at 10:26

## 2021-04-15 RX ADMIN — Medication 2 MILLIGRAM(S): at 16:31

## 2021-04-15 NOTE — BH INPATIENT PSYCHIATRY DISCHARGE NOTE - NSDCMRMEDTOKEN_GEN_ALL_CORE_FT
ARIPiprazole lauroxil 1064 mg/3.9 mL intramuscular suspension, extended release: 1064 milligram(s) intramuscular every 6 hours  benztropine 2 mg oral tablet: 1 tab(s) orally once a day (at bedtime)  fluPHENAZine: 37.5 milligram(s) intramuscular every 3 weeks  lithium 450 mg oral tablet, extended release: 2 tab(s) orally once a day (at bedtime)  LORazepam 1 mg oral tablet: 1 tab(s) orally 3 times a day for three days then 1/2 tab orally 3 times a day for three days then stop MDD:3mg   ARIPiprazole lauroxil 1064 mg/3.9 mL intramuscular suspension, extended release: 1064 milligram(s) intramuscular every 8 weeks. Next dosing 6/6/21   benztropine 2 mg oral tablet: 1 tab(s) orally once a day (at bedtime)  fluPHENAZine decanoate 25 mg/mL injectable solution: 25 milligram(s) intramuscularly every 3 weeks. Next dosing 5/18/21   fluPHENAZine decanoate 25 mg/mL injectable solution: 0.5 milligram(s) intramuscularly every 3 weeks. Nrext dose 5/18/21   lithium 450 mg oral tablet, extended release: 1 tab(s) orally once a day in the morning and 2 tabs orally at bedtime.

## 2021-04-15 NOTE — BH INPATIENT PSYCHIATRY DISCHARGE NOTE - NSBHDCMEDICALFT_PSY_A_CORE
Patient is not diagnosed with any significant medical issues.  At the time of this treatment summary, the patient has not had any acute medical changes during his hospitalization. There have been no medical consultations. Patient was discharge with COVID 19 negative results. No cough, SOB, CP, or fever at time of discharge. Vitals WNL.
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
## 2021-04-15 NOTE — BH INPATIENT PSYCHIATRY DISCHARGE NOTE - NSDCCPCAREPLAN_GEN_ALL_CORE_FT
PRINCIPAL DISCHARGE DIAGNOSIS  Diagnosis: Schizophrenia  Assessment and Plan of Treatment:       SECONDARY DISCHARGE DIAGNOSES  Diagnosis: Polysubstance abuse  Assessment and Plan of Treatment:

## 2021-04-15 NOTE — PSYCHIATRIC REHAB INITIAL EVALUATION - NSBHALCSUBCHOICE_PSY_ALL_CORE
Pt admitted he used some marijuana prior to his admission. As per chart, pt used crack cocaine, and last use was a few weeks ago with unsure amount.
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
## 2021-04-15 NOTE — BH INPATIENT PSYCHIATRY DISCHARGE NOTE - HOSPITAL COURSE
to be completed Patient was admitted to [facility] inpatient service on 9.39 legal status. Patient's labs were grossly WNL, and toxicology was positive for cocaine and cannabis. Hosptilization was delayed due to patient’s refusal with medications.  Patient admitted with a diagnosis of Schizoaffective Disorder.  Patient was disorganized, with ongoing perceptual disturbances. Psychotic symptoms successfully elicited; talking to self, RIS, appears internally preoccupied, aggressive, and disorganized. On admission patient was irritable, intrusive, oppositional during initial interview.  Patient was uncooperative, manic, tangential, pressured speech and mood lability.  Following a prolonged discussion regarding risks (including but not limited to EPS, weight gain, NMS, TD, metabolic syndrome) and benefits (attenuated psychosis, more organized thought process and behavior),  isaac was re- started on Lithium 450mg BID, titrated to 1350mg. Refusing medications for several weeks before eventually achieving compliance.  Stating he did not like the way the medications made him feel. Patient tolerated Lithium 1350mg.  Prior to this admission patient was on ML3 and readmitted to 4 days later.  He had received Prolixin Deconate 37.5mg q3 weeks and Aristada 1064mg q8 weeeks on ML3 on 4/6/21.   Patient received Prolixin Deconate on 4/23/21 during course of treatment, next injection on 5/18/21,  and next Aristada 1064mg on 6/6/21. On the combination of medication.  Patient showed improvement in psychotic and manic symptoms.  Behaviorally patient had bouts of irritability,  threatening behaviors on the unit, requiring IM medications.    Pts ACT and AOT workers in the community were in contact with the treatment team, during his hospitalization and expressed concerns about patient's history of violence, substance use and noncompliance and recommended referral to [facility] hospital for patient.  State referral was tried during prior admission on ML3 however patient was awarded court petition discharge. This admission treatment team decided to proceed with State referral again. Patient once again petitioned for court discharged and was granted.   On day of discharge patient reported he felt safe to leave.  Denies  SI/HI/AVH at time of discharge.  Patient was granted court ordered discharged, left under no distress, accompanied by mother.     Discharge Pan:  -Patient given 4 weeks supply of medications. Risk, benefits and alternatives discussed with patient. Patient verbalized understanding and in accord with aftercare recommendations.   -Patient instructed to call 911 or go to nearest ER in case of emergency.   -Patient given Suicide Prevention Lifeline number 1-462.595.9162 and provided instructions on its use  -Patient will follow up with outpatient appointments with his ACT Team.
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
## 2021-04-15 NOTE — BH INPATIENT PSYCHIATRY PROGRESS NOTE - NSBHCHARTREVIEWVS_PSY_A_CORE FT
Vital Signs Last 24 Hrs  T(C): 36.4 (15 Apr 2021 06:44), Max: 36.8 (14 Apr 2021 13:47)  T(F): 97.6 (15 Apr 2021 06:44), Max: 98.2 (14 Apr 2021 13:47)  HR: 58 (15 Apr 2021 08:16) (58 - 72)  BP: 101/58 (15 Apr 2021 08:16) (101/58 - 129/82)  BP(mean): --  RR: 18 (14 Apr 2021 21:26) (18 - 18)  SpO2: 100% (14 Apr 2021 21:26) (96% - 100%)

## 2021-04-15 NOTE — BH INPATIENT PSYCHIATRY DISCHARGE NOTE - NSBHMETABOLIC_PSY_ALL_CORE_FT
BMI: BMI (kg/m2): 23.5 (04-14-21 @ 14:50)  HbA1c: A1C with Estimated Average Glucose Result: 6.0 % (02-09-21 @ 10:10)    Glucose:   BP: 101/58 (04-15-21 @ 08:16) (101/58 - 101/58)  Lipid Panel: Date/Time: 02-09-21 @ 10:10  Cholesterol, Serum: 264  Direct LDL: --  HDL Cholesterol, Serum: 84  Total Cholesterol/HDL Ration Measurement: --  Triglycerides, Serum: 130
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
## 2021-04-15 NOTE — BH INPATIENT PSYCHIATRY DISCHARGE NOTE - NSBHDCRISKMITIGATE_PSY_ALL_CORE
Safety planning/Referral to ACT Team/Long acting injectable medication/Medications targeting suicidality/non-suicidal self injurious behavior

## 2021-04-15 NOTE — BH INPATIENT PSYCHIATRY DISCHARGE NOTE - NSBHDCSIGEVENTSFT_PSY_A_CORE
to be completed Behaviorally patient had bouts of irritability,  threatening behaviors on the unit, requiring IM medications.  Days leading up to court date patient in better behavioral control

## 2021-04-15 NOTE — BH INPATIENT PSYCHIATRY DISCHARGE NOTE - VIOLENCE RISK FACTORS:
Feeling of being under threat and being unable to control threat/Substance abuse/Affective dysregulation/Lack of insight into violence risk/need for treatment/Irritability
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
## 2021-04-16 PROCEDURE — 99232 SBSQ HOSP IP/OBS MODERATE 35: CPT | Mod: 25

## 2021-04-16 PROCEDURE — 90853 GROUP PSYCHOTHERAPY: CPT

## 2021-04-16 RX ADMIN — Medication 2 MILLIGRAM(S): at 09:06

## 2021-04-16 RX ADMIN — Medication 2 MILLIGRAM(S): at 14:48

## 2021-04-16 NOTE — BH INPATIENT PSYCHIATRY PROGRESS NOTE - NSBHCHARTREVIEWVS_PSY_A_CORE FT
Vital Signs Last 24 Hrs  T(C): 36.4 (16 Apr 2021 06:20), Max: 36.4 (15 Apr 2021 19:12)  T(F): 97.6 (16 Apr 2021 06:20), Max: 97.6 (16 Apr 2021 06:20)  HR: --  BP: --  BP(mean): --  RR: 16 (15 Apr 2021 20:54) (16 - 16)  SpO2: 99% (15 Apr 2021 20:54) (99% - 99%) Vital Signs Last 24 Hrs  T(C): 36.4 (16 Apr 2021 06:20), Max: 36.4 (15 Apr 2021 19:12)  T(F): 97.6 (16 Apr 2021 06:20), Max: 97.6 (16 Apr 2021 06:20)  HR: --66  BP: --116/62  BP(mean): --  RR: 16 (15 Apr 2021 20:54) (16 - 16)  SpO2: 99% (15 Apr 2021 20:54) (99% - 99%)
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
## 2021-04-16 NOTE — BH INPATIENT PSYCHIATRY PROGRESS NOTE - NSBHFUPINTERVALHXFT_PSY_A_CORE
Patient seen for aggression, psychosis in context of substance use with cocaine/cannabis abuse.  Chart reviewed, and case discussed with treatment team: treatment team is moving forward to State application.   No events reported overnight.  Patient has a high recidivist hospitalization, currently has ACT/AOT from [facility]. Patient reports fair sleep and appetite.  Patient denies physical complaints.  Patient denies SI/HI/AVH.  Patient is observed in his room, appears internally preoccupied. Reports mood as “I just want to go home.”  Patient’s presents disorganized, rambling speech, BRAYAN and internally preoccupied. Denies SI/HI, denies all psychotic features.  No mention of sleep disturbances or appetite concerns.  Patient refusing standing medications Lithium.  However, patient recently received his RENO Aristada 1064mg and Prolixin Deconate 37.5mg on 4/6/21. Patient presents disheveled, matted hair, unshaven.

## 2021-04-17 PROCEDURE — 99231 SBSQ HOSP IP/OBS SF/LOW 25: CPT

## 2021-04-17 RX ADMIN — Medication 2 MILLIGRAM(S): at 16:49

## 2021-04-17 RX ADMIN — Medication 2 MILLIGRAM(S): at 12:35

## 2021-04-17 RX ADMIN — Medication 2 MILLIGRAM(S): at 08:33

## 2021-04-17 NOTE — BH INPATIENT PSYCHIATRY PROGRESS NOTE - NSBHASSESSSUMMFT_PSY_ALL_CORE
Continue plan as per primary treatment team:    Plan:  >Legal: 9.37  >Obs: Routine, no current SI. no need for CO, patient not expected to pose risk to self or others in controlled inpatient setting  >Psychiatric Meds: Continue outpatient medication regimen: Lithium 900mg qhs,  Prolixin 5mg daily. Patient recently received Prolixin Dec 37.5 and Aristada 1064mg on 4/6/21.  >Labs: Admission labs reviewed, no acute findings. QTc:  >Medical: No acute concerns. Follow up with Medical team as needed  >Diet: Regular   >Social: milieu/structured therapy  >Treatment Interventions: Groups and Individual Therapy/CBT, Motivational counseling for substance abuse related issues.   >Dispo: STATE referral

## 2021-04-17 NOTE — BH INPATIENT PSYCHIATRY PROGRESS NOTE - NSBHCHARTREVIEWVS_PSY_A_CORE FT
Vital Signs Last 24 Hrs  T(C): 36.3 (17 Apr 2021 14:51), Max: 36.4 (17 Apr 2021 00:13)  T(F): 97.3 (17 Apr 2021 14:51), Max: 97.6 (17 Apr 2021 00:13)  HR: 71 (17 Apr 2021 08:10) (71 - 71)  BP: 97/63 (17 Apr 2021 08:10) (97/63 - 97/63)  BP(mean): --  RR: 18 (17 Apr 2021 00:13) (18 - 18)  SpO2: 99% (17 Apr 2021 00:13) (99% - 99%)

## 2021-04-17 NOTE — BH INPATIENT PSYCHIATRY PROGRESS NOTE - NSBHFUPINTERVALHXFT_PSY_A_CORE
Patient seen for aggression, psychosis in context of substance use with cocaine/cannabis abuse.  Chart reviewed, and case discussed with treatment team: treatment team is moving forward to State application.   No events reported overnight.  Pt has been sleeping in the quiet room, keeping to himself and refusing standing meds.   On interview, pt says that he feels "o.k." He says that he's more comfortable in the quiet room. He denies any psychotic symptoms. He inquires about getting "all the clinical testing" that is possible to get.

## 2021-04-18 PROCEDURE — 99231 SBSQ HOSP IP/OBS SF/LOW 25: CPT

## 2021-04-18 RX ADMIN — Medication 2 MILLIGRAM(S): at 08:50

## 2021-04-18 RX ADMIN — Medication 2 MILLIGRAM(S): at 12:51

## 2021-04-18 RX ADMIN — Medication 2 MILLIGRAM(S): at 16:56

## 2021-04-18 RX ADMIN — Medication 2 MILLIGRAM(S): at 21:00

## 2021-04-18 NOTE — BH INPATIENT PSYCHIATRY PROGRESS NOTE - NSBHCHARTREVIEWVS_PSY_A_CORE FT
Vital Signs Last 24 Hrs  T(C): 36.3 (18 Apr 2021 20:45), Max: 36.8 (18 Apr 2021 14:36)  T(F): 97.4 (18 Apr 2021 20:45), Max: 98.2 (18 Apr 2021 14:36)    Orthostatic VS    04-18-21 @ 08:23  Lying BP: --/-- HR: --   Sitting BP: 118/68 HR: 76  Standing BP: 122/73 HR: 84  Site: upper left arm   Mode: electronic

## 2021-04-18 NOTE — BH INPATIENT PSYCHIATRY PROGRESS NOTE - NSBHFUPINTERVALHXFT_PSY_A_CORE
Patient seen for aggression, psychosis in context of substance use with cocaine/cannabis abuse.  Chart reviewed, and case discussed with treatment team: treatment team is moving forward to State application.   No events reported overnight.  Pt has been sleeping in the quiet room.   On interview, pt says that he slept well last night and has been staying in the quiet room because he likes the privacy. He says that his mood is good and denies any other problems or concerns.

## 2021-04-19 PROCEDURE — 99232 SBSQ HOSP IP/OBS MODERATE 35: CPT

## 2021-04-19 RX ADMIN — Medication 2 MILLIGRAM(S): at 18:36

## 2021-04-19 RX ADMIN — Medication 2 MILLIGRAM(S): at 14:34

## 2021-04-19 RX ADMIN — Medication 2 MILLIGRAM(S): at 10:28

## 2021-04-19 NOTE — BH INPATIENT PSYCHIATRY PROGRESS NOTE - NSBHCHARTREVIEWVS_PSY_A_CORE FT
Vital Signs Last 24 Hrs  T(C): 36.6 (18 Apr 2021 22:12), Max: 36.8 (18 Apr 2021 14:36)  T(F): 97.8 (18 Apr 2021 22:12), Max: 98.2 (18 Apr 2021 14:36)  HR: --  BP: --114/84  BP(mean): --  RR: --  SpO2: --

## 2021-04-19 NOTE — BH INPATIENT PSYCHIATRY PROGRESS NOTE - NSBHASSESSSUMMFT_PSY_ALL_CORE
Plan:  >Legal: 9.37  >Obs: Routine, no current SI. no need for CO, patient not expected to pose risk to self or others in controlled inpatient setting  >Psychiatric Meds: Continue outpatient medication regimen: Lithium 900mg qhs,  Prolixin 5mg daily. Patient recently received Prolixin Dec 37.5 and Aristada 1064mg on 4/6/21.  >Labs: Admission labs reviewed, no acute findings. QTc:  >Medical: No acute concerns. Follow up with Medical team as needed  >Diet: Regular   >Social: milieu/structured therapy  >Treatment Interventions: Groups and Individual Therapy/CBT, Motivational counseling for substance abuse related issues.   >Dispo: STATE referral

## 2021-04-19 NOTE — BH INPATIENT PSYCHIATRY PROGRESS NOTE - NSBHFUPINTERVALHXFT_PSY_A_CORE
Patient seen for aggression, psychosis in context of substance use with cocaine/cannabis abuse.  Chart reviewed, and case discussed with treatment team: treatment team is moving forward to State application.   No events reported overnight.  Patient has a high recidivist hospitalization, currently has ACT/AOT from [facility]. Discussed with treatment team proceeding with State referral.  Patient reports fair sleep and appetite.  Patient denies physical complaints.  Patient denies SI/HI/AVH.  Patient is observed more on unit today,  calm on approach but appears disorganized and internally preoccupied. Reports that he wants to be in a “sexual research study.”    Patient’s presents disorganized, rambling speech, BRAYAN and internally preoccupied. Patient refusing standing medications Lithium and prolixin.  However, patient recently received his RENO Aristada 1064mg and Prolixin Deconate 37.5mg on 4/6/21. Patient presents disheveled, matted hair, unshaven, ADL substandard.  Writer informed patient that his mother is attempting to speak with me, patient refused consent at this time to speak to mother.

## 2021-04-20 PROCEDURE — 99232 SBSQ HOSP IP/OBS MODERATE 35: CPT

## 2021-04-20 RX ADMIN — Medication 2 MILLIGRAM(S): at 08:25

## 2021-04-20 RX ADMIN — GABAPENTIN 400 MILLIGRAM(S): 400 CAPSULE ORAL at 12:00

## 2021-04-20 RX ADMIN — Medication 1 MILLIGRAM(S): at 10:28

## 2021-04-20 RX ADMIN — GABAPENTIN 400 MILLIGRAM(S): 400 CAPSULE ORAL at 21:34

## 2021-04-20 RX ADMIN — Medication 2 MILLIGRAM(S): at 21:35

## 2021-04-20 RX ADMIN — Medication 2 MILLIGRAM(S): at 14:34
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
## 2021-04-20 NOTE — BH INPATIENT PSYCHIATRY PROGRESS NOTE - NSBHCHARTREVIEWVS_PSY_A_CORE FT
Vital Signs Last 24 Hrs  T(C): 36.6 (20 Apr 2021 06:22), Max: 36.6 (20 Apr 2021 06:22)  T(F): 97.8 (20 Apr 2021 06:22), Max: 97.8 (20 Apr 2021 06:22)  HR: --  BP: --  BP(mean): --  RR: --  SpO2: --

## 2021-04-21 PROCEDURE — 93010 ELECTROCARDIOGRAM REPORT: CPT

## 2021-04-21 PROCEDURE — 99232 SBSQ HOSP IP/OBS MODERATE 35: CPT

## 2021-04-21 RX ADMIN — Medication 2 MILLIGRAM(S): at 14:42

## 2021-04-21 RX ADMIN — Medication 2 MILLIGRAM(S): at 08:46

## 2021-04-21 RX ADMIN — GABAPENTIN 400 MILLIGRAM(S): 400 CAPSULE ORAL at 14:42

## 2021-04-21 RX ADMIN — Medication 2 MILLIGRAM(S): at 21:13

## 2021-04-21 NOTE — BH INPATIENT PSYCHIATRY PROGRESS NOTE - NSBHCHARTREVIEWVS_PSY_A_CORE FT
Vital Signs Last 24 Hrs  T(C): 36.4 (21 Apr 2021 06:43), Max: 36.4 (20 Apr 2021 14:30)  T(F): 97.6 (21 Apr 2021 06:43), Max: 97.6 (21 Apr 2021 06:43)  HR: 98 (21 Apr 2021 08:16) (98 - 98)  BP: 127/93 (21 Apr 2021 08:16) (127/93 - 127/93)  BP(mean): --  RR: --  SpO2: --

## 2021-04-21 NOTE — BH INPATIENT PSYCHIATRY PROGRESS NOTE - NSBHFUPINTERVALHXFT_PSY_A_CORE
Patient seen for aggression, psychosis in context of substance use with cocaine/cannabis abuse.  Chart reviewed, and case discussed with treatment team: patient has a high recidivist hospitalization, currently has ACT/AOT from [facility], treatment team is moving forward to State application, is scheduled for hearing today.  No events reported overnight per nursing patient  continues to refuse to sleep in his room, slept in quite room last night despite staff encouraging him to go to his own room.  Patient appears bizarre, odd relatedness, disorganized and internally preoccupied.  Presents disorganized, rambling speech, nonsensical speech, BRAYAN and internally preoccupied. No appetite concerns, denies physical complaints. Patient presents malodorous, with substandard ADL’s. Continues to refuse standing Lithium medication.  Patient is due next Prolixin Deconate 37.5mg on 4/27/21.   Addendum: Personal collateral from mother (Abeba) [phone]. Discussed precipitating events leading to admission. Reviewed medication regimen, and treatment goals. Mother is aware that treatment team is proceeding with State Referral.

## 2021-04-21 NOTE — BH INPATIENT PSYCHIATRY PROGRESS NOTE - NSBHASSESSSUMMFT_PSY_ALL_CORE
Plan:  >Legal: 9.37  >Obs: Routine, no current SI. no need for CO, patient not expected to pose risk to self or others in controlled inpatient setting  >Psychiatric Meds: Continue outpatient medication regimen: Lithium 900mg qhs,  Prolixin 5mg daily. Patient recently received Prolixin Dec 37.5 and Aristada 1064mg on 4/6/21.  >Labs: Admission labs reviewed,   >Medical: No acute concerns. Follow up with Medical team as needed  >Diet: Regular   >Social: milieu/structured therapy  >Treatment Interventions: Groups and Individual Therapy/CBT, Motivational counseling for substance abuse related issues.   >Dispo: STATE referral       Plan:  >Legal: 9.37  >Obs: Routine, no current SI. no need for CO, patient not expected to pose risk to self or others in controlled inpatient setting  >Psychiatric Meds: Continue outpatient medication regimen: Lithium 900mg qhs,  Prolixin 5mg daily. Patient recently received Prolixin Dec 37.5 and Aristada 1064mg on 4/6/21. Next dosing for Prolixn 37.5n=mg due 4/27.   >Labs: WNL,   >Medical: No acute concerns. Follow up with Medical team as needed  >Diet: Regular   >Social: milieu/structured therapy  >Treatment Interventions: Groups and Individual Therapy/CBT, Motivational counseling for substance abuse related issues.   >Dispo: STATE referral, had State Hearing today
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
## 2021-04-22 PROCEDURE — 99232 SBSQ HOSP IP/OBS MODERATE 35: CPT

## 2021-04-22 RX ADMIN — Medication 2 MILLIGRAM(S): at 14:36

## 2021-04-22 RX ADMIN — Medication 2 MILLIGRAM(S): at 08:34

## 2021-04-22 NOTE — BH INPATIENT PSYCHIATRY PROGRESS NOTE - NSBHCHARTREVIEWVS_PSY_A_CORE FT
Vital Signs Last 24 Hrs  T(C): 36.4 (21 Apr 2021 20:06), Max: 36.7 (21 Apr 2021 14:24)  T(F): 97.6 (21 Apr 2021 20:06), Max: 98 (21 Apr 2021 14:24)  HR: --  BP: --  BP(mean): --  RR: --  SpO2: --

## 2021-04-22 NOTE — BH INPATIENT PSYCHIATRY PROGRESS NOTE - NSBHASSESSSUMMFT_PSY_ALL_CORE
Plan:  >Legal: 9.37  >Obs: Routine, no current SI. no need for CO, patient not expected to pose risk to self or others in controlled inpatient setting  >Psychiatric Meds: Continue outpatient medication regimen: Lithium 900mg qhs,  Prolixin 5mg daily. Patient recently received Prolixin Dec 37.5 and Aristada 1064mg on 4/6/21. Next dosing for Prolixn 37.5n=mg due 4/27.   >Labs: WNL,   >Medical: No acute concerns. Follow up with Medical team as needed  >Diet: Regular   >Social: milieu/structured therapy  >Treatment Interventions: Groups and Individual Therapy/CBT, Motivational counseling for substance abuse related issues.   >Dispo: STATE referral, had State Hearing today

## 2021-04-22 NOTE — BH INPATIENT PSYCHIATRY PROGRESS NOTE - NSBHFUPINTERVALHXFT_PSY_A_CORE
Patient seen for aggression, psychosis in context of substance use with cocaine/cannabis abuse.  Chart reviewed, and case discussed with treatment team. No events reported overnight. Patient had his State hearing yesterday, during hearing patient agreed to go to State.  Since then patient has been anxious, restless, following writer around on unit, perseverative about going to court, asking writer several times throughout the day when will he go to court. Patient is difficult to redirect. Patient made comments today about petitioning for discharge, then asked writer “If I go to long term do I still go to State.” Patient appears bizarre, odd relatedness, disorganized and internally preoccupied.  Presents disorganized, rambling speech, nonsensical speech, BRAYAN and internally preoccupied. Displayed poor grooming, strong body odor, disheveled hair, dirty clothing, is bare feet on unit.  No appetite concerns, denies physical complaints.     Addendum: Collateral obtained from patient’s outpatient team via phone conference. In attendance: Nicole ACT Team admissions director, Rupert [facility] ACT Team -[phone].  Discussed precipitating events leading to current admission, medication regimen and treatment progress with State referral.

## 2021-04-23 PROCEDURE — 99232 SBSQ HOSP IP/OBS MODERATE 35: CPT

## 2021-04-23 RX ADMIN — Medication 2 MILLIGRAM(S): at 17:19

## 2021-04-23 RX ADMIN — Medication 2 MILLIGRAM(S): at 11:18

## 2021-04-23 RX ADMIN — GABAPENTIN 400 MILLIGRAM(S): 400 CAPSULE ORAL at 12:00

## 2021-04-23 NOTE — BH INPATIENT PSYCHIATRY PROGRESS NOTE - NSBHFUPINTERVALHXFT_PSY_A_CORE
Chart reviewed, and case discussed with treatment team. No events reported overnight. No significant changes in clinical status.  Patient is observed in his quite room, room is in disarray, patient has lined up empty cups against the wall.  When he is asked about this patient becomes instantly irritable, mood is labile.  Patient states he feels “angry and frustrated” regarding proceedings for State referral, “I don’t agree with State” demanding to be discharged and refer to a shelter.  Discussed with patient past behaviors leading to readmission: not compliant with ACT Team, often not findable by ACT Team. Patient reports Act Team is against him, reports wanting to be in control of his life. Reports “getting help makes me handicapped”  Patient remains bizarre, odd relatedness, disorganized and internally preoccupied.  Presents disorganized, rambling speech, nonsensical speech, BRAYAN and internally preoccupied. Displayed poor grooming, strong body odor, disheveled hair, dirty clothing, is bare feet on unit.  No appetite concerns, denies physical complaints. Continues to refuse standing Lithium.  Patient’s Prolixin Dec is due next week 4/27.

## 2021-04-23 NOTE — BH INPATIENT PSYCHIATRY PROGRESS NOTE - NSBHASSESSSUMMFT_PSY_ALL_CORE
Plan:  >Legal: 9.37  >Obs: Routine, no current SI. no need for CO, patient not expected to pose risk to self or others in controlled inpatient setting  >Psychiatric Meds: Continue outpatient medication regimen: Lithium 900mg qhs,  Prolixin 5mg daily. Patient recently received Prolixin Dec 37.5 and Aristada 1064mg on 4/6/21. Next dosing for Prolixn 37.5n=mg due 4/27.   >Labs: WNL,   >Medical: No acute concerns. Follow up with Medical team as needed  >Diet: Regular   >Social: milieu/structured therapy  >Treatment Interventions: Groups and Individual Therapy/CBT, Motivational counseling for substance abuse related issues.   >Dispo: STATE referral, had State Hearing

## 2021-04-23 NOTE — BH INPATIENT PSYCHIATRY PROGRESS NOTE - NSBHCHARTREVIEWVS_PSY_A_CORE FT
Vital Signs Last 24 Hrs  T(C): 36.4 (23 Apr 2021 08:13), Max: 36.4 (23 Apr 2021 08:13)  T(F): 97.6 (23 Apr 2021 08:13), Max: 97.6 (23 Apr 2021 08:13)  HR: --66  BP: --114/60  BP(mean): --  RR: 18 (22 Apr 2021 21:56) (18 - 18)  SpO2: 99% (22 Apr 2021 21:56) (99% - 99%)

## 2021-04-24 RX ADMIN — Medication 2 MILLIGRAM(S): at 07:31

## 2021-04-24 RX ADMIN — Medication 2 MILLIGRAM(S): at 15:02

## 2021-04-25 RX ADMIN — Medication 2 MILLIGRAM(S): at 13:59

## 2021-04-25 RX ADMIN — Medication 2 MILLIGRAM(S): at 07:58

## 2021-04-25 RX ADMIN — Medication 2 MILLIGRAM(S): at 19:53

## 2021-04-25 RX ADMIN — FLUPHENAZINE HYDROCHLORIDE 5 MILLIGRAM(S): 1 TABLET, FILM COATED ORAL at 20:29

## 2021-04-25 RX ADMIN — GABAPENTIN 400 MILLIGRAM(S): 400 CAPSULE ORAL at 18:12

## 2021-04-26 PROCEDURE — 99232 SBSQ HOSP IP/OBS MODERATE 35: CPT

## 2021-04-26 RX ORDER — FLUPHENAZINE HYDROCHLORIDE 1 MG/1
37.5 TABLET, FILM COATED ORAL ONCE
Refills: 0 | Status: COMPLETED | OUTPATIENT
Start: 2021-04-26 | End: 2021-04-26

## 2021-04-26 RX ADMIN — FLUPHENAZINE HYDROCHLORIDE 37.5 MILLIGRAM(S): 1 TABLET, FILM COATED ORAL at 16:28

## 2021-04-26 RX ADMIN — Medication 2 MILLIGRAM(S): at 17:33

## 2021-04-26 RX ADMIN — Medication 50 MILLIGRAM(S): at 21:20

## 2021-04-26 RX ADMIN — LITHIUM CARBONATE 450 MILLIGRAM(S): 300 TABLET, EXTENDED RELEASE ORAL at 22:38

## 2021-04-26 RX ADMIN — GABAPENTIN 400 MILLIGRAM(S): 400 CAPSULE ORAL at 10:37

## 2021-04-26 RX ADMIN — GABAPENTIN 400 MILLIGRAM(S): 400 CAPSULE ORAL at 17:37

## 2021-04-26 RX ADMIN — GABAPENTIN 400 MILLIGRAM(S): 400 CAPSULE ORAL at 21:20

## 2021-04-26 RX ADMIN — Medication 2 MILLIGRAM(S): at 22:38

## 2021-04-26 RX ADMIN — Medication 2 MILLIGRAM(S): at 10:17

## 2021-04-26 RX ADMIN — Medication 2 MILLIGRAM(S): at 02:01

## 2021-04-26 NOTE — BH INPATIENT PSYCHIATRY PROGRESS NOTE - NSBHFUPINTERVALHXFT_PSY_A_CORE
Chart reviewed, and case discussed with treatment team. No events reported over the weekend. No significant changes in clinical status. Per nursing patient has been masturbating in the hallway, and following female peers around in the hallways. Needs constant redirection. Patient presents to interview underdressed, not wearing pants, or underwear. Thought process remains disorganized, tangential, with bouts of bizarre statements, delusional themes. Patient denies SI/HI/AVH.  However psychotic symptoms successfully elicited. Patient is scheduled to receive monthly dose of Prolixin Deconate 37.5mg tomorrow, and agrees to take it today. Per nursing patient states he will take his Lithium if it means discharge today.  No mention of sleep disturbances or appetite concerns. Denies SI/HI/AVH.   No appetite or sleep concerns, denies physical complaints.  Patient reports feeling anxious, requested increase in Ativan. Medication teaching provided patient is encourage to utilize other prn medications for anxiety. Patient agrees to Gabapentin.

## 2021-04-26 NOTE — BH SCALES AND SCREENS - NSBPRSHALLBEH_PSY_ALL_CORE
5 = Moderately Severe – hallucinations are experienced nearly every day, or are a source of extreme distress

## 2021-04-26 NOTE — BH INPATIENT PSYCHIATRY PROGRESS NOTE - NSBHASSESSSUMMFT_PSY_ALL_CORE
Plan:  >Legal: 9.37  >Obs: Routine, no current SI.   >Psychiatric Meds: Continue outpatient medication regimen: Lithium 900mg qhs,  Patient recently received Prolixin Dec 37.5 and Aristada 1064mg on 4/6/21. Received  Prolixn 37.5mg today   >Labs: WNL,   >Medical: No acute concerns. Follow up with Medical team as needed  >Diet: Regular   >Social: milieu/structured therapy  >Treatment Interventions: Groups and Individual Therapy/CBT, Motivational counseling for substance abuse related issues.   >Dispo: STATE referral, had State Hearing

## 2021-04-26 NOTE — BH INPATIENT PSYCHIATRY PROGRESS NOTE - NSBHCHARTREVIEWVS_PSY_A_CORE FT
Vital Signs Last 24 Hrs  T(C): 36.9 (26 Apr 2021 06:25), Max: 36.9 (26 Apr 2021 06:25)  T(F): 98.5 (26 Apr 2021 06:25), Max: 98.5 (26 Apr 2021 06:25)  HR: --  BP: --  BP(mean): --  RR: 18 (25 Apr 2021 22:15) (18 - 18)  SpO2: 99% (25 Apr 2021 22:15) (99% - 99%)

## 2021-04-27 PROCEDURE — 99232 SBSQ HOSP IP/OBS MODERATE 35: CPT

## 2021-04-27 RX ORDER — CHLORPROMAZINE HCL 10 MG
50 TABLET ORAL ONCE
Refills: 0 | Status: COMPLETED | OUTPATIENT
Start: 2021-04-27 | End: 2021-04-27

## 2021-04-27 RX ORDER — CHLORPROMAZINE HCL 10 MG
25 TABLET ORAL ONCE
Refills: 0 | Status: DISCONTINUED | OUTPATIENT
Start: 2021-04-27 | End: 2021-05-11

## 2021-04-27 RX ORDER — CHLORPROMAZINE HCL 10 MG
25 TABLET ORAL ONCE
Refills: 0 | Status: COMPLETED | OUTPATIENT
Start: 2021-04-27 | End: 2021-04-27

## 2021-04-27 RX ORDER — DIPHENHYDRAMINE HCL 50 MG
50 CAPSULE ORAL ONCE
Refills: 0 | Status: COMPLETED | OUTPATIENT
Start: 2021-04-27 | End: 2021-04-27

## 2021-04-27 RX ADMIN — GABAPENTIN 400 MILLIGRAM(S): 400 CAPSULE ORAL at 08:13

## 2021-04-27 RX ADMIN — Medication 2 MILLIGRAM(S): at 23:11

## 2021-04-27 RX ADMIN — Medication 2 MILLIGRAM(S): at 08:13

## 2021-04-27 RX ADMIN — Medication 50 MILLIGRAM(S): at 22:04

## 2021-04-27 RX ADMIN — Medication 2 MILLIGRAM(S): at 16:47

## 2021-04-27 RX ADMIN — Medication 2 MILLIGRAM(S): at 10:55

## 2021-04-27 RX ADMIN — Medication 50 MILLIGRAM(S): at 10:35

## 2021-04-27 RX ADMIN — LITHIUM CARBONATE 450 MILLIGRAM(S): 300 TABLET, EXTENDED RELEASE ORAL at 08:13

## 2021-04-27 RX ADMIN — LITHIUM CARBONATE 450 MILLIGRAM(S): 300 TABLET, EXTENDED RELEASE ORAL at 22:05

## 2021-04-27 RX ADMIN — Medication 2 MILLIGRAM(S): at 18:22

## 2021-04-27 RX ADMIN — Medication 50 MILLIGRAM(S): at 18:22

## 2021-04-27 RX ADMIN — Medication 25 MILLIGRAM(S): at 10:55

## 2021-04-27 RX ADMIN — Medication 2 MILLIGRAM(S): at 22:04

## 2021-04-27 NOTE — BH INPATIENT PSYCHIATRY PROGRESS NOTE - NSBHASSESSSUMMFT_PSY_ALL_CORE
Plan:  >Legal: 9.37  >Obs: Routine, no current SI.   >Psychiatric Meds: Continue outpatient medication regimen: Lithium 900mg qhs,  Patient recently received Prolixin Dec 37.5 and Aristada 1064mg on 4/6/21. Received  Prolixn 37.5mg on 4/26   >Labs: WNL,   >Medical: No acute concerns. Follow up with Medical team as needed  >Diet: Regular   >Social: milieu/structured therapy  >Treatment Interventions: Groups and Individual Therapy/CBT, Motivational counseling for substance abuse related issues.   >Dispo: STATE referral, had State Hearing

## 2021-04-27 NOTE — BH INPATIENT PSYCHIATRY PROGRESS NOTE - NSBHCHARTREVIEWVS_PSY_A_CORE FT
Vital Signs Last 24 Hrs  T(C): 36.3 (27 Apr 2021 06:55), Max: 36.3 (26 Apr 2021 22:31)  T(F): 97.4 (27 Apr 2021 06:55), Max: 97.4 (26 Apr 2021 22:31)  HR: --  BP: --  BP(mean): --  RR: 18 (27 Apr 2021 08:07) (18 - 18)  SpO2: 100% (27 Apr 2021 08:07) (100% - 100%)

## 2021-04-27 NOTE — BH INPATIENT PSYCHIATRY PROGRESS NOTE - NSBHFUPINTERVALHXFT_PSY_A_CORE
Chart reviewed, and case discussed with treatment team. No interval events. Per nursing patient has been masturbating constantly in the hallway, and following female peers/staff around in the hallways, needs constant redirection. Patient requesting sharps priveleges but reports “I only want a female staff to watch me” patient is told he will have only male staff supervising him in his room.  Patient became upset because his primary nurse (who is female) entered his room accompanied by male staff.  However psychotic symptoms, aggression successfully elicited. Patient slept throughout the night, per nursing took his Lithium for the first time last night, pt denies appetite concerns.  Thought process remains conceptually disorganized, tangential, with bouts of bizarre statements, delusional themes. BPRS scale 27. Patient denies SI/HI/AVH.  Patient received monthly dose of Prolixin Deconate 37.5mg yesterday. Denies SI/HI/AVH.  Patient denies physical complaints.  Patient reports Gabapentin is helping with his anxiety.  Addendum: Patient increasingly aggressive, pacing on unit, following writer around on unit shouting, making verbal threats to writer and to staff,  walking in/out of group therapy,  disruptive on unit.  Patient is overheard making threats of homicide/suicide. Patient offered po prn medications, and threw water at the nurse.  IM medications activated (Thorazine 25mg and Ativan 2mg), support team called.

## 2021-04-28 PROCEDURE — 99232 SBSQ HOSP IP/OBS MODERATE 35: CPT

## 2021-04-28 RX ORDER — LANOLIN ALCOHOL/MO/W.PET/CERES
5 CREAM (GRAM) TOPICAL ONCE
Refills: 0 | Status: COMPLETED | OUTPATIENT
Start: 2021-04-28 | End: 2021-04-28

## 2021-04-28 RX ADMIN — Medication 2 MILLIGRAM(S): at 20:28

## 2021-04-28 RX ADMIN — Medication 50 MILLIGRAM(S): at 20:28

## 2021-04-28 RX ADMIN — LITHIUM CARBONATE 450 MILLIGRAM(S): 300 TABLET, EXTENDED RELEASE ORAL at 07:29

## 2021-04-28 RX ADMIN — Medication 5 MILLIGRAM(S): at 00:17

## 2021-04-28 RX ADMIN — Medication 2 MILLIGRAM(S): at 07:29

## 2021-04-28 RX ADMIN — LITHIUM CARBONATE 450 MILLIGRAM(S): 300 TABLET, EXTENDED RELEASE ORAL at 20:28

## 2021-04-28 RX ADMIN — HALOPERIDOL DECANOATE 5 MILLIGRAM(S): 100 INJECTION INTRAMUSCULAR at 20:28

## 2021-04-28 RX ADMIN — Medication 2 MILLIGRAM(S): at 18:30

## 2021-04-28 RX ADMIN — Medication 2 MILLIGRAM(S): at 22:10

## 2021-04-28 NOTE — BH INPATIENT PSYCHIATRY PROGRESS NOTE - NSBHCHARTREVIEWVS_PSY_A_CORE FT
Vital Signs Last 24 Hrs  T(C): 36.7 (28 Apr 2021 06:52), Max: 37 (27 Apr 2021 22:33)  T(F): 98.1 (28 Apr 2021 06:52), Max: 98.6 (27 Apr 2021 22:33)  HR: --  BP: --  BP(mean): --  RR: --  SpO2: --

## 2021-04-28 NOTE — BH INPATIENT PSYCHIATRY PROGRESS NOTE - NSBHFUPINTERVALHXFT_PSY_A_CORE
Chart reviewed, and case discussed with treatment team. No interval events. Patient did have IM medications during the day yesterday, last night patient in better behavioral control. Per nursing patient took his standing evening medications, with po prn Benadryl 50mg, Ativan 2mg, and Melatonin 5mg. Despite po prns and IM medications, patient had very poor sleep last night, only slept for one hour in total. Patient is observed at nurse station today, seemed irritable, restless/anxious.  Perseverated on court date, asked multiple times about court, stopping writer in hallway multiple times even when with another patient.  Reports “being here is making me feel depressed” Patient states “I have strong political beliefs I just want to be left alone.” Thought process remains conceptually disorganized, tangential, at times FOI, with bouts of bizarre statements, delusional themes. Remains tenuous on unit. Patient denies SI/HI, but was making homicide/suicide statements yesterday to blow up the hospital.  No such statement today.

## 2021-04-29 PROCEDURE — 99232 SBSQ HOSP IP/OBS MODERATE 35: CPT

## 2021-04-29 RX ORDER — LITHIUM CARBONATE 300 MG/1
450 TABLET, EXTENDED RELEASE ORAL DAILY
Refills: 0 | Status: DISCONTINUED | OUTPATIENT
Start: 2021-04-30 | End: 2021-05-11

## 2021-04-29 RX ORDER — TRAZODONE HCL 50 MG
50 TABLET ORAL AT BEDTIME
Refills: 0 | Status: DISCONTINUED | OUTPATIENT
Start: 2021-04-29 | End: 2021-05-11

## 2021-04-29 RX ORDER — LITHIUM CARBONATE 300 MG/1
450 TABLET, EXTENDED RELEASE ORAL AT BEDTIME
Refills: 0 | Status: COMPLETED | OUTPATIENT
Start: 2021-04-29 | End: 2021-04-29

## 2021-04-29 RX ORDER — LITHIUM CARBONATE 300 MG/1
900 TABLET, EXTENDED RELEASE ORAL AT BEDTIME
Refills: 0 | Status: DISCONTINUED | OUTPATIENT
Start: 2021-04-30 | End: 2021-05-11

## 2021-04-29 RX ADMIN — Medication 50 MILLIGRAM(S): at 19:46

## 2021-04-29 RX ADMIN — HALOPERIDOL DECANOATE 5 MILLIGRAM(S): 100 INJECTION INTRAMUSCULAR at 19:47

## 2021-04-29 RX ADMIN — Medication 2 MILLIGRAM(S): at 19:46

## 2021-04-29 RX ADMIN — Medication 50 MILLIGRAM(S): at 19:47

## 2021-04-29 RX ADMIN — LITHIUM CARBONATE 450 MILLIGRAM(S): 300 TABLET, EXTENDED RELEASE ORAL at 08:57

## 2021-04-29 RX ADMIN — Medication 2 MILLIGRAM(S): at 06:37

## 2021-04-29 RX ADMIN — Medication 2 MILLIGRAM(S): at 19:47

## 2021-04-29 RX ADMIN — Medication 2 MILLIGRAM(S): at 13:43

## 2021-04-29 RX ADMIN — LITHIUM CARBONATE 450 MILLIGRAM(S): 300 TABLET, EXTENDED RELEASE ORAL at 19:46

## 2021-04-29 RX ADMIN — GABAPENTIN 400 MILLIGRAM(S): 400 CAPSULE ORAL at 21:35

## 2021-04-29 NOTE — BH INPATIENT PSYCHIATRY PROGRESS NOTE - NSBHFUPINTERVALHXFT_PSY_A_CORE
Patient is seen for psychosis/maurice/aggression. Patient has court petitioned for discharge.  Current hospitalization, patient represents profoundly disorganized, delusional and paranoid. Initially patient had diminished willingness to pursue needed treatment. On the unit patient has been disruptive, aggressive.  Patient has potential to be dangerous to self and others due to his explosive reactions, patient loses sense of scale and perspectives, and has exhibited emotional volitity.  Patient has needed emergent IM medications several times during his course of treatment for aggression.  Continues to need daily po prn medications. Patient is incredibly paranoid, delusional and hard to follow. Initially patient refused all standing medications, only recently started taking medications. At this time patient has not shown adequate treatment progress for discharge.   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 Patient has potential to be violent when symptomatic. Treatment team is proceeding with State application due to his long history of recidivist hospitalizations, most recently patient has been admitted inpatient three times within three months.  Patient’s ACT Team supports decision for State. Patient has petitioned for discharge, and is requesting to go to court.   However based on his clinical status patient would benefit with continued treatment and medications. He currently denies SI/HI, but has made homicidal/suicidal statements to blow up the hospital then kill himself. He denies any psychotic symptoms, despite psychotic symptoms elicited. Thought process remains conceptually disorganized, tangential, FOI, with bouts of bizarre statements, delusional themes. Patient’s judgement is completely impaired and illogical.  Remains tenuous on unit.  Patient has not received adequate dose of therapy such that the likelihood of obtaining the desired symptomatic relief is currently limited. He continues to need treatment interventions and State hospitalization. Discharging patient at this time is premature, and places patient at risk of re-hospitalization.

## 2021-04-30 LAB — LITHIUM SERPL-MCNC: 0.5 MMOL/L — LOW (ref 0.6–1.2)

## 2021-04-30 PROCEDURE — 99232 SBSQ HOSP IP/OBS MODERATE 35: CPT

## 2021-04-30 RX ADMIN — Medication 2 MILLIGRAM(S): at 21:36

## 2021-04-30 RX ADMIN — GABAPENTIN 400 MILLIGRAM(S): 400 CAPSULE ORAL at 12:32

## 2021-04-30 RX ADMIN — Medication 50 MILLIGRAM(S): at 00:12

## 2021-04-30 RX ADMIN — Medication 2 MILLIGRAM(S): at 13:49

## 2021-04-30 RX ADMIN — LITHIUM CARBONATE 900 MILLIGRAM(S): 300 TABLET, EXTENDED RELEASE ORAL at 21:27

## 2021-04-30 RX ADMIN — Medication 2 MILLIGRAM(S): at 07:37

## 2021-04-30 RX ADMIN — HALOPERIDOL DECANOATE 5 MILLIGRAM(S): 100 INJECTION INTRAMUSCULAR at 12:35

## 2021-04-30 RX ADMIN — Medication 50 MILLIGRAM(S): at 12:32

## 2021-04-30 RX ADMIN — Medication 50 MILLIGRAM(S): at 14:51

## 2021-04-30 RX ADMIN — LITHIUM CARBONATE 450 MILLIGRAM(S): 300 TABLET, EXTENDED RELEASE ORAL at 08:27

## 2021-04-30 NOTE — BH INPATIENT PSYCHIATRY PROGRESS NOTE - NSBHCHARTREVIEWVS_PSY_A_CORE FT
Vital Signs Last 24 Hrs  T(C): 37.1 (30 Apr 2021 06:02), Max: 37.1 (30 Apr 2021 06:02)  T(F): 98.7 (30 Apr 2021 06:02), Max: 98.7 (30 Apr 2021 06:02)  HR: --  BP: --  BP(mean): --  RR: --  SpO2: --

## 2021-04-30 NOTE — BH INPATIENT PSYCHIATRY PROGRESS NOTE - NSBHFUPINTERVALHXFT_PSY_A_CORE
Patient is seen for psychosis/maurice/aggression. Patient has court petitioned for discharge. Patient discussed in treatment team: pursuing State referral. Per nursing patient continues to require po prns.  Per sleep log patient had poor sleep last night, despite prn’s.  On the unit patient has been disruptive, aggressive.  Patient is fixed on court date and being released, at times explosive reactions towards writer, demanding to be discharged.  Patient accusing writer of “abusing him” by keeping him here “I want to be free, keeping me here is turning me into a murdered.”  Shouting at writer “you’re doing this because I’m white.”   Patient shouting murder/suicide, reports “I’m turning into a mass murder.” Patient reports having access to a AK47, “it’s not mine but I can get hold of it.  Loses sense of scale and perspectives, and has exhibited emotional volitity.  Treatment team is proceeding with State application due to his long history of recidivist hospitalizations.  Patient’s ACT Team supports decision for State. He currently denies SI/HI, but has made homicidal/suicidal statements to blow up the hospital then kill himself. He denies any psychotic symptoms, despite psychotic symptoms elicited. Thought process remains conceptually disorganized, tangential, FOI, with bouts of bizarre statements, delusional themes. Patient’s judgement is completely impaired and illogical.  Remains tenuous on unit.  Patient has been more medication compliant with Lithium, will titrate Lithium 450mg daily and 900mg qhs. Lithium serum level scheduled for today.   Addendum: Lithium serum level resulted 0.5.

## 2021-04-30 NOTE — BH INPATIENT PSYCHIATRY PROGRESS NOTE - NSBHASSESSSUMMFT_PSY_ALL_CORE
Plan:  >Legal: 9.37  >Obs: Routine, no current SI.   >Psychiatric Meds: Increase Lithium 450mg daily, continue Lithium 900mg qhs,  Patient recently received Prolixin Dec 37.5 on 4/ 26/21 , Aristada 1064mg on 4/6/21.   >Labs: Lithium level resulted 0.5  >Medical: No acute concerns. Follow up with Medical team as needed  >Diet: Regular   >Social: milieu/structured therapy  >Treatment Interventions: Groups and Individual Therapy/CBT, Motivational counseling for substance abuse related issues.   >Dispo: STATE referral, had State Hearing

## 2021-05-01 PROCEDURE — 99232 SBSQ HOSP IP/OBS MODERATE 35: CPT

## 2021-05-01 RX ORDER — HALOPERIDOL DECANOATE 100 MG/ML
5 INJECTION INTRAMUSCULAR ONCE
Refills: 0 | Status: COMPLETED | OUTPATIENT
Start: 2021-05-01 | End: 2021-05-01

## 2021-05-01 RX ORDER — NICOTINE POLACRILEX 2 MG
2 GUM BUCCAL
Refills: 0 | Status: DISCONTINUED | OUTPATIENT
Start: 2021-05-01 | End: 2021-05-11

## 2021-05-01 RX ORDER — DIPHENHYDRAMINE HCL 50 MG
50 CAPSULE ORAL ONCE
Refills: 0 | Status: COMPLETED | OUTPATIENT
Start: 2021-05-01 | End: 2021-05-01

## 2021-05-01 RX ADMIN — Medication 2 MILLIGRAM(S): at 17:41

## 2021-05-01 RX ADMIN — Medication 2 MILLIGRAM(S): at 08:26

## 2021-05-01 RX ADMIN — Medication 2 MILLIGRAM(S): at 12:49

## 2021-05-01 RX ADMIN — Medication 2 MILLIGRAM(S): at 09:51

## 2021-05-01 RX ADMIN — LITHIUM CARBONATE 450 MILLIGRAM(S): 300 TABLET, EXTENDED RELEASE ORAL at 08:27

## 2021-05-01 RX ADMIN — Medication 50 MILLIGRAM(S): at 20:50

## 2021-05-01 RX ADMIN — HALOPERIDOL DECANOATE 5 MILLIGRAM(S): 100 INJECTION INTRAMUSCULAR at 09:51

## 2021-05-01 RX ADMIN — LITHIUM CARBONATE 900 MILLIGRAM(S): 300 TABLET, EXTENDED RELEASE ORAL at 20:50

## 2021-05-01 RX ADMIN — Medication 50 MILLIGRAM(S): at 09:51

## 2021-05-01 NOTE — BH INPATIENT PSYCHIATRY PROGRESS NOTE - NSBHFUPINTERVALHXFT_PSY_A_CORE
Patient is observed pacing hallway, following writer on the unit, shouting at writer.  Patient accusing writer of “abusing him” by keeping him here “  Patient is irritable, hostile,  disorganized, tangential, FOI, delusional Behavior has been tenuous on unit. Patient not following redirection from staff, patient following writer all around unit, would not follow redirection, increasingly oppositional today. Support team called at 9.49, IM activated given Haldol 5mg Ativan 2mg and Benadryl 50mg.

## 2021-05-01 NOTE — BH INPATIENT PSYCHIATRY PROGRESS NOTE - NSBHCHARTREVIEWVS_PSY_A_CORE FT
Vital Signs Last 24 Hrs  T(C): 36.4 (01 May 2021 06:27), Max: 36.5 (30 Apr 2021 14:42)  T(F): 97.5 (01 May 2021 06:27), Max: 97.7 (30 Apr 2021 14:42)  HR: --  BP: --  BP(mean): --  RR: --  SpO2: --

## 2021-05-01 NOTE — BH INPATIENT PSYCHIATRY PROGRESS NOTE - NSBHANTIPSYCHOTIC_PSY_ALL_CORE_FT
No signs of metabolic syndrome

## 2021-05-02 RX ADMIN — Medication 2 MILLIGRAM(S): at 08:18

## 2021-05-02 RX ADMIN — Medication 2 MILLIGRAM(S): at 14:30

## 2021-05-02 RX ADMIN — LITHIUM CARBONATE 450 MILLIGRAM(S): 300 TABLET, EXTENDED RELEASE ORAL at 08:18

## 2021-05-03 PROCEDURE — 99233 SBSQ HOSP IP/OBS HIGH 50: CPT

## 2021-05-03 RX ORDER — TUBERCULIN PURIFIED PROTEIN DERIVATIVE 5 [IU]/.1ML
5 INJECTION, SOLUTION INTRADERMAL ONCE
Refills: 0 | Status: COMPLETED | OUTPATIENT
Start: 2021-05-03 | End: 2021-05-03

## 2021-05-03 RX ADMIN — GABAPENTIN 400 MILLIGRAM(S): 400 CAPSULE ORAL at 08:39

## 2021-05-03 RX ADMIN — LITHIUM CARBONATE 900 MILLIGRAM(S): 300 TABLET, EXTENDED RELEASE ORAL at 21:38

## 2021-05-03 RX ADMIN — Medication 50 MILLIGRAM(S): at 21:38

## 2021-05-03 RX ADMIN — Medication 2 MILLIGRAM(S): at 08:39

## 2021-05-03 RX ADMIN — Medication 2 MILLIGRAM(S): at 14:41

## 2021-05-03 RX ADMIN — Medication 2 MILLIGRAM(S): at 21:38

## 2021-05-03 RX ADMIN — TUBERCULIN PURIFIED PROTEIN DERIVATIVE 5 UNIT(S): 5 INJECTION, SOLUTION INTRADERMAL at 17:52

## 2021-05-03 RX ADMIN — LITHIUM CARBONATE 450 MILLIGRAM(S): 300 TABLET, EXTENDED RELEASE ORAL at 08:39

## 2021-05-03 NOTE — BH INPATIENT PSYCHIATRY PROGRESS NOTE - NSBHASSESSSUMMFT_PSY_ALL_CORE
>Legal: 9.37  >Obs: Routine, no current SI.   >Psychiatric Meds: Increase Lithium 450mg daily, continue Lithium 900mg qhs,  Patient recently received Prolixin Dec 37.5 on 4/ 26/21 , Aristada 1064mg on 4/6/21. PPD ordered  >Labs: Lithium level resulted 0.5  >Medical: No acute concerns. Follow up with Medical team as needed  >Diet: Regular   >Social: milieu/structured therapy  >Treatment Interventions: Groups and Individual Therapy/CBT, Motivational counseling for substance abuse related issues.   >Dispo: STATE referral, had State Hearing

## 2021-05-03 NOTE — BH INPATIENT PSYCHIATRY PROGRESS NOTE - NSBHFUPINTERVALHXFT_PSY_A_CORE
Patient is seen for psychosis/maurice/aggression. Patient has court petitioned for discharge. Patient discussed in treatment team: pursuing State referral, writer completed IRF and submitted to . Per nursing patient continues to require po prns.  Patient had tenuous weekend, required IM medications, and support team called.  On the unit patient has been disruptive, aggressive.  Patient is fixed on court date and being released, at times explosive reactions towards writer, demanding to be discharged.  Treatment team is proceeding with State application due to his long history of recidivist hospitalizations.  Patient’s ACT Team supports decision for State. He currently denies SI/HI, but has made homicidal/suicidal statements to blow up the hospital then kill himself. Thought process remains conceptually disorganized, tangential, FOI, with bouts of bizarre statements, delusional themes.  Patient remains medication compliant with Lithium, Lithium 450mg daily and 900mg qhs. Lithium serum level resulted 0.5 on 4/30.  Order PPD for State referral.

## 2021-05-03 NOTE — BH INPATIENT PSYCHIATRY PROGRESS NOTE - NSBHCHARTREVIEWVS_PSY_A_CORE FT
Vital Signs Last 24 Hrs  T(C): 37.1 (03 May 2021 06:29), Max: 37.1 (03 May 2021 06:29)  T(F): 98.7 (03 May 2021 06:29), Max: 98.7 (03 May 2021 06:29)  HR: --62  BP: --101/60  BP(mean): --  RR: 20 (02 May 2021 19:54) (20 - 20)  SpO2: 98% (02 May 2021 19:54) (98% - 98%)

## 2021-05-04 PROCEDURE — 99233 SBSQ HOSP IP/OBS HIGH 50: CPT

## 2021-05-04 RX ADMIN — Medication 2 MILLIGRAM(S): at 14:32

## 2021-05-04 RX ADMIN — Medication 2 MILLIGRAM(S): at 08:09

## 2021-05-04 RX ADMIN — LITHIUM CARBONATE 450 MILLIGRAM(S): 300 TABLET, EXTENDED RELEASE ORAL at 08:09

## 2021-05-04 RX ADMIN — Medication 50 MILLIGRAM(S): at 22:00

## 2021-05-04 RX ADMIN — LITHIUM CARBONATE 900 MILLIGRAM(S): 300 TABLET, EXTENDED RELEASE ORAL at 22:00

## 2021-05-04 NOTE — BH INPATIENT PSYCHIATRY PROGRESS NOTE - NSBHFUPINTERVALHXFT_PSY_A_CORE
Patient is seen for psychosis/maurice/aggression. Patient has court petitioned for discharge. Patient discussed in treatment team: pursuing State referral, writer completed IRF and submitted to SW. Patient is observed in his room, calmer today than in prior days.  He currently denies SI/HI/AVH.  Psychotic symptoms still elicited.  Thought process remains conceptually disorganized, tangential, FOI, with bouts of bizarre statements, delusional themes.  Patient remains medication compliant with Lithium, level resulted 0.5 on 4/30.  Order PPD for State referral.  Addendum:  Collateral obtained from outpatient ACT Team psychiatrist Dr. Nicole Powell [phone].  Discussed precipitates leading to current admission, medication regimen discussed. Also discussed State referral.

## 2021-05-04 NOTE — BH INPATIENT PSYCHIATRY PROGRESS NOTE - NSBHCHARTREVIEWVS_PSY_A_CORE FT
Vital Signs Last 24 Hrs  T(C): 36.7 (04 May 2021 06:18), Max: 36.7 (03 May 2021 14:59)  T(F): 98 (04 May 2021 06:18), Max: 98.1 (03 May 2021 14:59)  HR: 89 (04 May 2021 08:19) (89 - 89)  BP: 121/76 (04 May 2021 08:19) (121/76 - 121/76)  BP(mean): --  RR: --  SpO2: --

## 2021-05-05 PROCEDURE — 90853 GROUP PSYCHOTHERAPY: CPT

## 2021-05-05 PROCEDURE — 99233 SBSQ HOSP IP/OBS HIGH 50: CPT | Mod: 25

## 2021-05-05 RX ADMIN — LITHIUM CARBONATE 450 MILLIGRAM(S): 300 TABLET, EXTENDED RELEASE ORAL at 08:28

## 2021-05-05 RX ADMIN — Medication 2 MILLIGRAM(S): at 08:28

## 2021-05-05 RX ADMIN — Medication 2 MILLIGRAM(S): at 14:29

## 2021-05-05 NOTE — BH INPATIENT PSYCHIATRY PROGRESS NOTE - NSBHFUPINTERVALHXFT_PSY_A_CORE
Patient was discussed during multidisciplinary teams meeting. Today reports, "feeling fine, just want to get out of here." Patient notes that he feels, "trapped" in the hospital and that the hospital is "trying to steal everything from me." He also states that the longer he stays here the more he feels that he, "want to shoot up the hospital but don't have to." He says that he currently doesn't feel the need to harm anyone but can get there if he is pushed enough. His primary concern today is that he wants to find a hotel or a shelter to stay at outside of the hospital. Denies auditory, visual or sensory hallucinations. Denies SI. IRF state application was renewed and submitted to social work team. Negative PPD. Patient was discussed during multidisciplinary teams meeting. Today reports, "feeling fine, just want to get out of here." Patient notes that he feels, "trapped" in the hospital and that the hospital is "trying to steal everything from me." He also states that the longer he stays here the more he feels that he, "want to shoot up the hospital but don't have to." He says that he currently doesn't feel the need to harm anyone but can get there if he is pushed enough. His primary concern today is that he wants to find a hotel or a shelter to stay at outside of the hospital. Denies auditory, visual or sensory hallucinations. Denies SI. IRF state application was completed and resubmitted to social work team. Negative PPD.

## 2021-05-05 NOTE — BH INPATIENT PSYCHIATRY PROGRESS NOTE - NSBHCHARTREVIEWVS_PSY_A_CORE FT
Vital Signs Last 24 Hrs  T(C): 36.9 (05 May 2021 06:21), Max: 36.9 (05 May 2021 06:21)  T(F): 98.5 (05 May 2021 06:21), Max: 98.5 (05 May 2021 06:21)  HR: --  BP: --  BP(mean): --  RR: 18 (05 May 2021 10:34) (18 - 18)  SpO2: 99% (04 May 2021 23:35) (99% - 99%) Vital Signs Last 24 Hrs  T(C): 36.9 (05 May 2021 06:21), Max: 36.9 (05 May 2021 06:21)  T(F): 98.5 (05 May 2021 06:21), Max: 98.5 (05 May 2021 06:21)  HR: --89 BPM   BP: 121/76 mmhg  BP(mean): --  RR: 18 (05 May 2021 10:34) (18 - 18)  SpO2: 99% (04 May 2021 23:35) (99% - 99%) Vital Signs Last 24 Hrs  T(C): 36.9 (05 May 2021 06:21), Max: 36.9 (05 May 2021 06:21)  T(F): 98.5 (05 May 2021 06:21), Max: 98.5 (05 May 2021 06:21)  HR: --89   BP: 121/76  BP(mean): --  RR: 18 (05 May 2021 10:34) (18 - 18)  SpO2: 99% (04 May 2021 23:35) (99% - 99%)

## 2021-05-05 NOTE — BH INPATIENT PSYCHIATRY PROGRESS NOTE - OTHER
denies AH but appears internally preoccupied and paranoid Patient still fidgety Occasionally mumbles

## 2021-05-05 NOTE — BH INPATIENT PSYCHIATRY PROGRESS NOTE - NSBHASSESSSUMMFT_PSY_ALL_CORE
>Legal: 9.37  >Obs: Routine, no current SI.   >Psychiatric Meds: Increase Lithium 450mg daily, continue Lithium 900mg qhs,  Patient recently received Prolixin Dec 37.5 on 4/ 26/21 , Aristada 1064mg on 4/6/21. PPD ordered  >Labs: Lithium level resulted 0.5  >Medical: No acute concerns. Follow up with Medical team as needed  >Diet: Regular   >Social: milieu/structured therapy  >Treatment Interventions: Groups and Individual Therapy/CBT, Motivational counseling for substance abuse related issues.   >Dispo: STATE referral, had State Hearing    >Legal: 9.37  >Obs: Routine, no current SI.   >Psychiatric Meds: Increase Lithium 450mg daily, continue Lithium 900mg qhs,  Patient recently received Prolixin Dec 37.5 on 4/ 26/21 , Aristada 1064mg on 4/6/21. PPD: negative.  >Labs: Lithium level resulted 0.5  >Medical: No acute concerns. Follow up with Medical team as needed  >Diet: Regular   >Social: milieu/structured therapy  >Treatment Interventions: Groups and Individual Therapy/CBT, Motivational counseling for substance abuse related issues.   >Dispo: STATE referral, had State Hearing. State application: IRF renewed and submitted to social work.

## 2021-05-06 PROCEDURE — 99233 SBSQ HOSP IP/OBS HIGH 50: CPT

## 2021-05-06 RX ADMIN — LITHIUM CARBONATE 450 MILLIGRAM(S): 300 TABLET, EXTENDED RELEASE ORAL at 08:24

## 2021-05-06 RX ADMIN — LITHIUM CARBONATE 900 MILLIGRAM(S): 300 TABLET, EXTENDED RELEASE ORAL at 20:56

## 2021-05-06 RX ADMIN — Medication 2 MILLIGRAM(S): at 16:27

## 2021-05-06 RX ADMIN — Medication 2 MILLIGRAM(S): at 08:28

## 2021-05-06 RX ADMIN — Medication 2 MILLIGRAM(S): at 20:55

## 2021-05-06 RX ADMIN — Medication 50 MILLIGRAM(S): at 20:55

## 2021-05-06 NOTE — BH INPATIENT PSYCHIATRY PROGRESS NOTE - NSBHASSESSSUMMFT_PSY_ALL_CORE
>Legal: 9.37  >Obs: Routine, no current SI.   >Psychiatric Meds: Increase Lithium 450mg daily, continue Lithium 900mg qhs,  Patient recently received Prolixin Dec 37.5 on 4/ 26/21 , Aristada 1064mg on 4/6/21. PPD: negative.  >Labs: Lithium level resulted 0.5  >Medical: No acute concerns. Follow up with Medical team as needed  >Diet: Regular   >Social: milieu/structured therapy  >Treatment Interventions: Groups and Individual Therapy/CBT, Motivational counseling for substance abuse related issues.   >Dispo: STATE referral, had State Hearing. State application: IRF renewed and submitted to social work.   >Legal: 9.37  >Obs: Routine, no current SI.   >Psychiatric Meds: Increase Lithium 450mg daily, continue Lithium 900mg qhs,  Patient recently received Prolixin Dec 37.5 on 4/ 26/21 , Aristada 1064mg on 4/6/21. PPD: negative.  >Labs: Lithium level resulted 0.5 on 4/30  >Medical: No acute concerns. Follow up with Medical team as needed  >Diet: Regular   >Social: milieu/structured therapy  >Treatment Interventions: Groups and Individual Therapy/CBT, Motivational counseling for substance abuse related issues.   >Dispo: STATE referral, had State Hearing. State application: IRF renewed and submitted to social work.

## 2021-05-06 NOTE — BH INPATIENT PSYCHIATRY PROGRESS NOTE - OTHER
Patient still fidgety denies AH but appears internally preoccupied and paranoid Occasionally mumbles

## 2021-05-06 NOTE — BH INPATIENT PSYCHIATRY PROGRESS NOTE - NSBHFUPINTERVALHXFT_PSY_A_CORE
Patient was discussed during multidisciplinary teams meeting. Today reports "feeling claustrophobic" because there's too many people in the unit. Primarily concerned about getting more ativan today, states that it keeps him "calm and happy." Denies visual, sensory or auditory hallucinations. Denies SI/HI. State application- IRF was submitted to social work team. Lithium levels were done this morning.  Patient was discussed during multidisciplinary teams meeting. Today reports "feeling claustrophobic" because there's too many people in the unit. Primarily concerned about getting more ativan today, states that it keeps him "calm and happy." Patient was educated that it is a highly addictive drug, has negative effects on the body and that's why it can't be given so often. Denies visual, sensory or auditory hallucinations. Denies SI/HI. State application- IRF was submitted to social work team. Lithium levels were done this morning.  Patient was discussed during multidisciplinary teams meeting. Today reports "feeling claustrophobic" because there's too many people in the unit. Primarily concerned about getting more ativan today, states that it keeps him "calm and happy." Patient was educated that it is a highly addictive drug, has negative effects on the body and that's why it can't be given so often. Denies visual, sensory or auditory hallucinations. Denies SI/HI. State application- IRF was submitted to social work team. Lithium levels scheduled for tomorrow morning.

## 2021-05-06 NOTE — BH INPATIENT PSYCHIATRY PROGRESS NOTE - NSBHCHARTREVIEWVS_PSY_A_CORE FT
Vital Signs Last 24 Hrs  T(C): 36.3 (05 May 2021 22:19), Max: 36.6 (05 May 2021 14:37)  T(F): 97.3 (05 May 2021 22:19), Max: 97.9 (05 May 2021 14:37)  HR: --  BP: --  BP(mean): --  RR: 18 (05 May 2021 10:34) (18 - 18)  SpO2: -- Vital Signs Last 24 Hrs  T(C): 36.3 (05 May 2021 22:19), Max: 36.6 (05 May 2021 14:37)  T(F): 97.3 (05 May 2021 22:19), Max: 97.9 (05 May 2021 14:37)  HR: 89 BPM  BP: 121/76 mmhg   BP(mean): --  RR: 18 (05 May 2021 10:34) (18 - 18)  SpO2: 99%

## 2021-05-07 LAB — LITHIUM SERPL-MCNC: 0.8 MMOL/L — SIGNIFICANT CHANGE UP (ref 0.6–1.2)

## 2021-05-07 PROCEDURE — 99233 SBSQ HOSP IP/OBS HIGH 50: CPT

## 2021-05-07 RX ADMIN — Medication 2 MILLIGRAM(S): at 09:52

## 2021-05-07 RX ADMIN — LITHIUM CARBONATE 450 MILLIGRAM(S): 300 TABLET, EXTENDED RELEASE ORAL at 09:52

## 2021-05-07 RX ADMIN — Medication 2 MILLIGRAM(S): at 16:53

## 2021-05-07 NOTE — BH TREATMENT PLAN - NSTXCAREGIVERPARTICIPATE_PSY_P_CORE
Chin Abscess
cellulitis
Family/Caregiver participated in identification of needs/problems/goals for treatment
Family/Caregiver participated in identification of needs/problems/goals for treatment

## 2021-05-07 NOTE — BH TREATMENT PLAN - NSDCCRITERIA_PSY_ALL_CORE
Symptom management, safety planning, care coordination
Symptom management, safety planning, care coordination  [facility] referral

## 2021-05-07 NOTE — BH INPATIENT PSYCHIATRY PROGRESS NOTE - NSBHASSESSSUMMFT_PSY_ALL_CORE
>Legal: 9.37  >Obs: Routine, no current SI.   >Psychiatric Meds: Increase Lithium 450mg daily, continue Lithium 900mg qhs,  Patient recently received Prolixin Dec 37.5 on 4/ 26/21 , Aristada 1064mg on 4/6/21. PPD: negative.  >Labs: Lithium level resulted 0.5 on 4/30  >Medical: No acute concerns. Follow up with Medical team as needed  >Diet: Regular   >Social: milieu/structured therapy  >Treatment Interventions: Groups and Individual Therapy/CBT, Motivational counseling for substance abuse related issues.   >Dispo: STATE referral, had State Hearing. State application: IRF renewed and submitted to social work.   >Legal: 9.37  >Obs: Routine, no current SI.   >Psychiatric Meds: Increase Lithium 450mg daily, continue Lithium 900mg qhs,  Patient recently received Prolixin Dec 37.5 on 4/ 26/21 , Aristada 1064mg on 4/6/21. PPD: negative.  >Labs: Lithium level resulted 0.5 on 4/30. Lithium levels (05/07) pending.   >Medical: No acute concerns. Follow up with Medical team as needed  >Diet: Regular   >Social: milieu/structured therapy  >Treatment Interventions: Groups and Individual Therapy/CBT, Motivational counseling for substance abuse related issues.   >Dispo: STATE referral, had State Hearing. State application: IRF renewed and submitted to social work.

## 2021-05-07 NOTE — BH TREATMENT PLAN - NSTXPROBVIOLNT_PSY_ALL_CORE
----- Message from Hanh Rojas sent at 11/10/2017 12:25 PM CST -----  Contact: self [phone]  Requesting a call back in regards to MRI scheduled. Suggested changes to the way its scheduled. Please advise  
VIOLENT/AGGRESSIVE BEHAVIOR
VIOLENT/AGGRESSIVE BEHAVIOR

## 2021-05-07 NOTE — BH INPATIENT PSYCHIATRY PROGRESS NOTE - NSBHCHARTREVIEWVS_PSY_A_CORE FT
Vital Signs Last 24 Hrs  T(C): 36.8 (07 May 2021 06:52), Max: 36.8 (06 May 2021 19:22)  T(F): 98.2 (07 May 2021 06:52), Max: 98.3 (06 May 2021 19:22)  HR: --  BP: --  BP(mean): --  RR: --  SpO2: -- Vital Signs Last 24 Hrs  T(C): 36.8 (07 May 2021 06:52), Max: 36.8 (06 May 2021 19:22)  T(F): 98.2 (07 May 2021 06:52), Max: 98.3 (06 May 2021 19:22)  HR: 89 BPM  BP: 112/66 mmhg  BP(mean): --  RR: 18  SpO2: 99%

## 2021-05-07 NOTE — BH TREATMENT PLAN - NSTXDCOTHRGOAL_PSY_ALL_CORE
Pt will show a reduction of disorganization and engage meaningfully with writer to identify a safe discharge plan. Pt will comply with recommended tx plan and medications for 5 days, identify 2 benefits for adhering to tx

## 2021-05-07 NOTE — BH INPATIENT PSYCHIATRY PROGRESS NOTE - NSBHFUPINTERVALHXFT_PSY_A_CORE
Patient was discussed during multidisciplinary teams meeting. Today he reports, "feeling ok." He thinks treatment course here has "been fine." Patient had minimal answers and just "wanted to go sleep." Denies sensory, visual or auditory hallucinations. Denies SI/HI. Blood work was done this morning. Lithium levels are pending. Pursuing state: IRF.  Patient was discussed during multidisciplinary teams meeting. Today he reports, "feeling ok." He thinks treatment course here has "been fine." Patient had minimal answers and just "wanted to go sleep." Denies sensory, visual or auditory hallucinations. Denies SI/HI. Blood work was done this morning. Lithium levels are pending. Pursuing State: IRF completed and submitted.

## 2021-05-07 NOTE — BH TREATMENT PLAN - NSTXVIOLNTINTERPR_PSY_ALL_CORE
Pt made minimal progress over this past week. Pt has continues to be unable to identify situation cause his aggression or anger outburst. Pt remains to be irritable, agitated, loud, and verbally aggressive at times. Writer will encourage pt to continue to work on maintain good behavioral control

## 2021-05-07 NOTE — BH TREATMENT PLAN - NSTXVIOLNTINTERRN_PSY_ALL_CORE
assessed patient for signs of agitation/aggression that could lead to violence - patient still has aggressive outburts but have been less frequent

## 2021-05-07 NOTE — BH INPATIENT PSYCHIATRY PROGRESS NOTE - OTHER
Patient still fidgety Occasionally mumbles  denies AH but appears internally preoccupied and paranoid

## 2021-05-07 NOTE — BH TREATMENT PLAN - NSTXPSYCHOGOAL_PSY_ALL_CORE
Will verbalize 1 strategy to successfully cope with psychotic symptoms
Will ask for PRN medication to manage hallucinations

## 2021-05-07 NOTE — BH TREATMENT PLAN - NSTXMEDICINTERPR_PSY_ALL_CORE
Pt made some progress over this past Pt has demonstrate medication compliance over this past week. Pt would benefit from continue to demonstrate medication compliance

## 2021-05-07 NOTE — BH TREATMENT PLAN - NSTXPLANTHERAPYSESSIONSFT_PSY_ALL_CORE
05-06-21  Type of therapy: No group attendance.  Type of session: Individual  Level of patient participation: Disruptive,Resistance to participation  Duration of participation: Less than 15 minutes  Therapy conducted by: Psych rehab  Therapy Summary: Over this past week, pt continues to be labile, easy agitated, demanding, argumentative, internally preoccupied, and disorganized. During the individual therapy session, pt stated medication made him depressed. Then pt was observed ot start to yell at nurse and demand for medication. Pt was unable to engage in individual therapy session, stated he doesn’t want to talk, then walk away from this writer. Pt is unable to participate in self-rating CGI. As per team, pt demand to be discharge and petition to be discharge. Treatment team is pursuing [facility]. As per team, pt continue to make threaten to shoot up the hospital. Pt continues to have poor insight into his illness.     In response to COIVD-19 outbreak, there is a shift change in hospital wide protocols and policies. Psychiatric rehabilitation staff has been providing group with requirement of social distancing and wearing mask. Pt has not attended any group despite psychiatric rehabilitation staff’s prompting. Pt was visible on the unit, pacing back and forth in the hallway, disruptive, talking to himself, showed minimal interactions with others. Pt showed poor ADLs.

## 2021-05-08 RX ADMIN — LITHIUM CARBONATE 900 MILLIGRAM(S): 300 TABLET, EXTENDED RELEASE ORAL at 21:20

## 2021-05-08 RX ADMIN — LITHIUM CARBONATE 450 MILLIGRAM(S): 300 TABLET, EXTENDED RELEASE ORAL at 08:31

## 2021-05-08 RX ADMIN — Medication 2 MILLIGRAM(S): at 17:33

## 2021-05-08 RX ADMIN — Medication 50 MILLIGRAM(S): at 21:20

## 2021-05-08 RX ADMIN — Medication 2 MILLIGRAM(S): at 11:34

## 2021-05-09 RX ADMIN — LITHIUM CARBONATE 900 MILLIGRAM(S): 300 TABLET, EXTENDED RELEASE ORAL at 19:39

## 2021-05-09 RX ADMIN — Medication 2 MILLIGRAM(S): at 12:19

## 2021-05-09 RX ADMIN — LITHIUM CARBONATE 450 MILLIGRAM(S): 300 TABLET, EXTENDED RELEASE ORAL at 08:30

## 2021-05-09 RX ADMIN — Medication 2 MILLIGRAM(S): at 19:39

## 2021-05-10 PROCEDURE — 99233 SBSQ HOSP IP/OBS HIGH 50: CPT

## 2021-05-10 RX ADMIN — LITHIUM CARBONATE 450 MILLIGRAM(S): 300 TABLET, EXTENDED RELEASE ORAL at 08:20

## 2021-05-10 RX ADMIN — LITHIUM CARBONATE 900 MILLIGRAM(S): 300 TABLET, EXTENDED RELEASE ORAL at 20:05

## 2021-05-10 RX ADMIN — Medication 2 MILLIGRAM(S): at 07:29

## 2021-05-10 RX ADMIN — Medication 2 MILLIGRAM(S): at 14:00

## 2021-05-10 RX ADMIN — Medication 2 MILLIGRAM(S): at 20:05

## 2021-05-10 NOTE — BH INPATIENT PSYCHIATRY PROGRESS NOTE - NSBHCHARTREVIEWVS_PSY_A_CORE FT
Vital Signs Last 24 Hrs  T(C): 36.4 (10 May 2021 06:28), Max: 36.8 (09 May 2021 14:33)  T(F): 97.6 (10 May 2021 06:28), Max: 98.2 (09 May 2021 14:33)  HR: 79 (10 May 2021 08:27) (79 - 79)  BP: 114/83 (10 May 2021 08:27) (114/83 - 114/83)  BP(mean): --  RR: 16 (09 May 2021 22:18) (16 - 16)  SpO2: 99% (09 May 2021 22:18) (99% - 99%)

## 2021-05-10 NOTE — BH INPATIENT PSYCHIATRY PROGRESS NOTE - NSBHASSESSSUMMFT_PSY_ALL_CORE
>Legal: 9.37  >Obs: Routine, no current SI.   >Psychiatric Meds: Increase Lithium 450mg daily, continue Lithium 900mg qhs,  Patient recently received Prolixin Dec 37.5 on 4/ 26/21 , Aristada 1064mg on 4/6/21. PPD: negative.  >Labs: Lithium level resulted 0.5 on 4/30. Lithium levels (05/07) pending.   >Medical: No acute concerns. Follow up with Medical team as needed  >Diet: Regular   >Social: milieu/structured therapy  >Treatment Interventions: Groups and Individual Therapy/CBT, Motivational counseling for substance abuse related issues.   >Dispo: STATE referral, had State Hearing. State application: IRF renewed and submitted to social work.   >Legal: 9.37  >Obs: Routine, no current SI.   >Psychiatric Meds: Increase Lithium 450mg daily, continue Lithium 900mg qhs,  Patient recently received Prolixin Dec 37.5 on 4/ 26/21 , Aristada 1064mg on 4/6/21. PPD: negative.  >Labs: Lithium level resulted 0.5 on 4/30. Lithium levels (05/07) 0.8.   >Medical: No acute concerns. Follow up with Medical team as needed  >Diet: Regular   >Social: milieu/structured therapy  >Treatment Interventions: Groups and Individual Therapy/CBT, Motivational counseling for substance abuse related issues.   >Dispo: STATE referral, had State Hearing. State application: IRF renewed and submitted to social work.

## 2021-05-10 NOTE — BH INPATIENT PSYCHIATRY PROGRESS NOTE - NSBHFUPINTERVALHXFT_PSY_A_CORE
Patient was discussed during multidisciplinary teams meeting. Today reports, "feeling fine." When asked how his weekend was or if he had any concerns he siply replied with, "what do you want to hear? I'll say it." Refused to comment any further. Overall patient appears calmer and has not needed any PRN's for aggression but continues utilize PRN's. He refused cogentin last night. Serum lithium level on Friday 05/07 was 0.8 and is within a therapeutic range. Today he denies any sensory, visual or auditory hallucinations. Denies SI/HI.  Patient was discussed during multidisciplinary teams meeting. Today reports, "feeling fine." When asked how his weekend was or if he had any concerns he replied with, "what do you want to hear? I'll say it." Refused to comment any further. Overall patient appears calmer and has not needed any PRN's for aggression but continues utilize PRN's. He refused cogentin last night. Serum lithium level on Friday 05/07 was 0.8 and is within a therapeutic range. Today he denies any sensory, visual or auditory hallucinations. Denies SI/HI.

## 2021-05-11 VITALS — TEMPERATURE: 98 F

## 2021-05-11 PROCEDURE — 99233 SBSQ HOSP IP/OBS HIGH 50: CPT

## 2021-05-11 RX ORDER — LITHIUM CARBONATE 300 MG/1
2 TABLET, EXTENDED RELEASE ORAL
Qty: 30 | Refills: 0
Start: 2021-05-11 | End: 2021-06-09

## 2021-05-11 RX ORDER — LITHIUM CARBONATE 300 MG/1
1 TABLET, EXTENDED RELEASE ORAL
Qty: 30 | Refills: 0
Start: 2021-05-11 | End: 2021-06-09

## 2021-05-11 RX ORDER — FLUPHENAZINE HYDROCHLORIDE 1 MG/1
1 TABLET, FILM COATED ORAL
Qty: 1 | Refills: 0
Start: 2021-05-11 | End: 2021-05-11

## 2021-05-11 RX ORDER — BENZTROPINE MESYLATE 1 MG
1 TABLET ORAL
Qty: 30 | Refills: 0
Start: 2021-05-11 | End: 2021-06-09

## 2021-05-11 RX ORDER — FLUPHENAZINE HYDROCHLORIDE 1 MG/1
25 TABLET, FILM COATED ORAL
Qty: 1 | Refills: 0
Start: 2021-05-11 | End: 2021-05-11

## 2021-05-11 RX ORDER — ARIPIPRAZOLE 15 MG/1
1064 TABLET ORAL
Qty: 1 | Refills: 0
Start: 2021-05-11 | End: 2021-06-09

## 2021-05-11 RX ORDER — FLUPHENAZINE HYDROCHLORIDE 1 MG/1
0.5 TABLET, FILM COATED ORAL
Qty: 1 | Refills: 0
Start: 2021-05-11 | End: 2021-05-11

## 2021-05-11 RX ADMIN — LITHIUM CARBONATE 450 MILLIGRAM(S): 300 TABLET, EXTENDED RELEASE ORAL at 08:47

## 2021-05-11 RX ADMIN — Medication 2 MILLIGRAM(S): at 08:47

## 2021-05-11 NOTE — BH INPATIENT PSYCHIATRY PROGRESS NOTE - NSBHCHARTREVIEWVS_PSY_A_CORE FT
Vital Signs Last 24 Hrs  T(C): 36.9 (11 May 2021 06:25), Max: 36.9 (11 May 2021 06:25)  T(F): 98.4 (11 May 2021 06:25), Max: 98.4 (11 May 2021 06:25)  HR: --  BP: --  BP(mean): --  RR: --  SpO2: -- Vital Signs Last 24 Hrs  T(C): 36.9 (11 May 2021 06:25), Max: 36.9 (11 May 2021 06:25)  T(F): 98.4 (11 May 2021 06:25), Max: 98.4 (11 May 2021 06:25)  HR: 79 BPM  BP: 114/68 mmhg   BP(mean): --  RR: 18   SpO2: 99%

## 2021-05-11 NOTE — BH INPATIENT PSYCHIATRY PROGRESS NOTE - CURRENT MEDICATION
MEDICATIONS  (STANDING):  benztropine 2 milliGRAM(s) Oral at bedtime  fluPHENAZine 5 milliGRAM(s) Oral two times a day  lithium CR (ESKALITH-CR) 450 milliGRAM(s) Oral two times a day    MEDICATIONS  (PRN):  chlorproMAZINE    Injectable 50 milliGRAM(s) IntraMuscular once PRN severe agitation  chlorproMAZINE    Tablet 50 milliGRAM(s) Oral every 4 hours PRN agitation  diphenhydrAMINE 50 milliGRAM(s) Oral every 4 hours PRN agitation  diphenhydrAMINE   Injectable 50 milliGRAM(s) IntraMuscular once PRN severe agitation  gabapentin 400 milliGRAM(s) Oral three times a day PRN Anxiety  haloperidol     Tablet 5 milliGRAM(s) Oral every 4 hours PRN agitation  haloperidol    Injectable 5 milliGRAM(s) IntraMuscular once PRN severe agitation  LORazepam     Tablet 2 milliGRAM(s) Oral every 4 hours PRN anxiety/agitation  LORazepam   Injectable 2 milliGRAM(s) IntraMuscular once PRN severe agitation  
MEDICATIONS  (STANDING):  benztropine 2 milliGRAM(s) Oral at bedtime  lithium CR (ESKALITH-CR) 450 milliGRAM(s) Oral daily  lithium CR (ESKALITH-CR) 900 milliGRAM(s) Oral at bedtime  traZODone 50 milliGRAM(s) Oral at bedtime    MEDICATIONS  (PRN):  chlorproMAZINE    Injectable 50 milliGRAM(s) IntraMuscular once PRN severe agitation  chlorproMAZINE    Injectable 25 milliGRAM(s) IntraMuscular once PRN agitation  chlorproMAZINE    Tablet 50 milliGRAM(s) Oral every 4 hours PRN agitation  diphenhydrAMINE 50 milliGRAM(s) Oral every 4 hours PRN agitation  diphenhydrAMINE   Injectable 50 milliGRAM(s) IntraMuscular once PRN severe agitation  gabapentin 400 milliGRAM(s) Oral three times a day PRN Anxiety  haloperidol     Tablet 5 milliGRAM(s) Oral every 4 hours PRN agitation  haloperidol    Injectable 5 milliGRAM(s) IntraMuscular once PRN severe agitation  LORazepam     Tablet 2 milliGRAM(s) Oral every 6 hours PRN anxiety/agitation  LORazepam   Injectable 2 milliGRAM(s) IntraMuscular once PRN agitation  nicotine  Polacrilex Gum 2 milliGRAM(s) Oral every 3 hours PRN smoking cessation  
MEDICATIONS  (STANDING):  benztropine 2 milliGRAM(s) Oral at bedtime  lithium CR (ESKALITH-CR) 900 milliGRAM(s) Oral at bedtime  lithium CR (ESKALITH-CR) 450 milliGRAM(s) Oral daily  traZODone 50 milliGRAM(s) Oral at bedtime    MEDICATIONS  (PRN):  chlorproMAZINE    Injectable 50 milliGRAM(s) IntraMuscular once PRN severe agitation  chlorproMAZINE    Injectable 25 milliGRAM(s) IntraMuscular once PRN agitation  chlorproMAZINE    Tablet 50 milliGRAM(s) Oral every 4 hours PRN agitation  diphenhydrAMINE 50 milliGRAM(s) Oral every 4 hours PRN agitation  diphenhydrAMINE   Injectable 50 milliGRAM(s) IntraMuscular once PRN severe agitation  gabapentin 400 milliGRAM(s) Oral three times a day PRN Anxiety  haloperidol     Tablet 5 milliGRAM(s) Oral every 4 hours PRN agitation  haloperidol    Injectable 5 milliGRAM(s) IntraMuscular once PRN severe agitation  LORazepam     Tablet 2 milliGRAM(s) Oral every 6 hours PRN agitation/anxiety  LORazepam   Injectable 2 milliGRAM(s) IntraMuscular once PRN agitation  nicotine  Polacrilex Gum 2 milliGRAM(s) Oral every 3 hours PRN smoking cessation  
MEDICATIONS  (STANDING):  benztropine 2 milliGRAM(s) Oral at bedtime  lithium CR (ESKALITH-CR) 900 milliGRAM(s) Oral at bedtime  lithium CR (ESKALITH-CR) 450 milliGRAM(s) Oral daily  traZODone 50 milliGRAM(s) Oral at bedtime    MEDICATIONS  (PRN):  chlorproMAZINE    Injectable 50 milliGRAM(s) IntraMuscular once PRN severe agitation  chlorproMAZINE    Injectable 25 milliGRAM(s) IntraMuscular once PRN agitation  chlorproMAZINE    Tablet 50 milliGRAM(s) Oral every 4 hours PRN agitation  diphenhydrAMINE 50 milliGRAM(s) Oral every 4 hours PRN agitation  diphenhydrAMINE   Injectable 50 milliGRAM(s) IntraMuscular once PRN severe agitation  gabapentin 400 milliGRAM(s) Oral three times a day PRN Anxiety  haloperidol     Tablet 5 milliGRAM(s) Oral every 4 hours PRN agitation  haloperidol    Injectable 5 milliGRAM(s) IntraMuscular once PRN severe agitation  LORazepam     Tablet 2 milliGRAM(s) Oral every 6 hours PRN agitation/anxiety  LORazepam   Injectable 2 milliGRAM(s) IntraMuscular once PRN agitation  nicotine  Polacrilex Gum 2 milliGRAM(s) Oral every 3 hours PRN smoking cessation  
MEDICATIONS  (STANDING):  benztropine 2 milliGRAM(s) Oral at bedtime  fluPHENAZine 5 milliGRAM(s) Oral two times a day  lithium CR (ESKALITH-CR) 450 milliGRAM(s) Oral two times a day    MEDICATIONS  (PRN):  chlorproMAZINE    Injectable 50 milliGRAM(s) IntraMuscular once PRN severe agitation  chlorproMAZINE    Tablet 50 milliGRAM(s) Oral every 4 hours PRN agitation  diphenhydrAMINE 50 milliGRAM(s) Oral every 4 hours PRN agitation  diphenhydrAMINE   Injectable 50 milliGRAM(s) IntraMuscular once PRN severe agitation  gabapentin 400 milliGRAM(s) Oral three times a day PRN Anxiety  haloperidol     Tablet 5 milliGRAM(s) Oral every 4 hours PRN agitation  haloperidol    Injectable 5 milliGRAM(s) IntraMuscular once PRN severe agitation  LORazepam     Tablet 2 milliGRAM(s) Oral every 4 hours PRN anxiety/agitation  LORazepam   Injectable 2 milliGRAM(s) IntraMuscular once PRN severe agitation  
MEDICATIONS  (STANDING):  benztropine 2 milliGRAM(s) Oral at bedtime  fluPHENAZine 5 milliGRAM(s) Oral two times a day  lithium CR (ESKALITH-CR) 450 milliGRAM(s) Oral two times a day    MEDICATIONS  (PRN):  chlorproMAZINE    Injectable 50 milliGRAM(s) IntraMuscular once PRN severe agitation  chlorproMAZINE    Tablet 50 milliGRAM(s) Oral every 4 hours PRN agitation  diphenhydrAMINE 50 milliGRAM(s) Oral every 4 hours PRN agitation  diphenhydrAMINE   Injectable 50 milliGRAM(s) IntraMuscular once PRN severe agitation  gabapentin 400 milliGRAM(s) Oral three times a day PRN Anxiety  haloperidol     Tablet 5 milliGRAM(s) Oral every 4 hours PRN agitation  haloperidol    Injectable 5 milliGRAM(s) IntraMuscular once PRN severe agitation  LORazepam     Tablet 2 milliGRAM(s) Oral every 4 hours PRN anxiety/agitation  LORazepam   Injectable 2 milliGRAM(s) IntraMuscular once PRN severe agitation  
MEDICATIONS  (STANDING):  benztropine 2 milliGRAM(s) Oral at bedtime  lithium CR (ESKALITH-CR) 900 milliGRAM(s) Oral at bedtime  lithium CR (ESKALITH-CR) 450 milliGRAM(s) Oral daily  traZODone 50 milliGRAM(s) Oral at bedtime    MEDICATIONS  (PRN):  chlorproMAZINE    Injectable 50 milliGRAM(s) IntraMuscular once PRN severe agitation  chlorproMAZINE    Injectable 25 milliGRAM(s) IntraMuscular once PRN agitation  chlorproMAZINE    Tablet 50 milliGRAM(s) Oral every 4 hours PRN agitation  diphenhydrAMINE 50 milliGRAM(s) Oral every 4 hours PRN agitation  diphenhydrAMINE   Injectable 50 milliGRAM(s) IntraMuscular once PRN severe agitation  gabapentin 400 milliGRAM(s) Oral three times a day PRN Anxiety  haloperidol     Tablet 5 milliGRAM(s) Oral every 4 hours PRN agitation  haloperidol    Injectable 5 milliGRAM(s) IntraMuscular once PRN severe agitation  LORazepam     Tablet 2 milliGRAM(s) Oral every 6 hours PRN agitation/anxiety  LORazepam   Injectable 2 milliGRAM(s) IntraMuscular once PRN agitation  nicotine  Polacrilex Gum 2 milliGRAM(s) Oral every 3 hours PRN smoking cessation  
MEDICATIONS  (STANDING):  benztropine 2 milliGRAM(s) Oral at bedtime  lithium CR (ESKALITH-CR) 450 milliGRAM(s) Oral two times a day    MEDICATIONS  (PRN):  chlorproMAZINE    Injectable 25 milliGRAM(s) IntraMuscular once PRN agitation  chlorproMAZINE    Injectable 50 milliGRAM(s) IntraMuscular once PRN severe agitation  chlorproMAZINE    Tablet 50 milliGRAM(s) Oral every 4 hours PRN agitation  diphenhydrAMINE 50 milliGRAM(s) Oral every 4 hours PRN agitation  diphenhydrAMINE   Injectable 50 milliGRAM(s) IntraMuscular once PRN severe agitation  gabapentin 400 milliGRAM(s) Oral three times a day PRN Anxiety  haloperidol     Tablet 5 milliGRAM(s) Oral every 4 hours PRN agitation  haloperidol    Injectable 5 milliGRAM(s) IntraMuscular once PRN severe agitation  LORazepam     Tablet 2 milliGRAM(s) Oral every 6 hours PRN anxiety/agitation  LORazepam   Injectable 2 milliGRAM(s) IntraMuscular once PRN agitation  
MEDICATIONS  (STANDING):  benztropine 2 milliGRAM(s) Oral at bedtime  lithium CR (ESKALITH-CR) 900 milliGRAM(s) Oral at bedtime  lithium CR (ESKALITH-CR) 450 milliGRAM(s) Oral daily  traZODone 50 milliGRAM(s) Oral at bedtime    MEDICATIONS  (PRN):  chlorproMAZINE    Injectable 50 milliGRAM(s) IntraMuscular once PRN severe agitation  chlorproMAZINE    Injectable 25 milliGRAM(s) IntraMuscular once PRN agitation  chlorproMAZINE    Tablet 50 milliGRAM(s) Oral every 4 hours PRN agitation  diphenhydrAMINE 50 milliGRAM(s) Oral every 4 hours PRN agitation  diphenhydrAMINE   Injectable 50 milliGRAM(s) IntraMuscular once PRN severe agitation  gabapentin 400 milliGRAM(s) Oral three times a day PRN Anxiety  haloperidol     Tablet 5 milliGRAM(s) Oral every 4 hours PRN agitation  haloperidol    Injectable 5 milliGRAM(s) IntraMuscular once PRN severe agitation  LORazepam     Tablet 2 milliGRAM(s) Oral every 6 hours PRN agitaion/anxiety  LORazepam   Injectable 2 milliGRAM(s) IntraMuscular once PRN agitation  nicotine  Polacrilex Gum 2 milliGRAM(s) Oral every 3 hours PRN smoking cessation  
MEDICATIONS  (STANDING):  benztropine 2 milliGRAM(s) Oral at bedtime  lithium CR (ESKALITH-CR) 900 milliGRAM(s) Oral at bedtime  lithium CR (ESKALITH-CR) 450 milliGRAM(s) Oral daily  traZODone 50 milliGRAM(s) Oral at bedtime    MEDICATIONS  (PRN):  chlorproMAZINE    Injectable 50 milliGRAM(s) IntraMuscular once PRN severe agitation  chlorproMAZINE    Injectable 25 milliGRAM(s) IntraMuscular once PRN agitation  chlorproMAZINE    Tablet 50 milliGRAM(s) Oral every 4 hours PRN agitation  diphenhydrAMINE 50 milliGRAM(s) Oral every 4 hours PRN agitation  diphenhydrAMINE   Injectable 50 milliGRAM(s) IntraMuscular once PRN severe agitation  gabapentin 400 milliGRAM(s) Oral three times a day PRN Anxiety  haloperidol     Tablet 5 milliGRAM(s) Oral every 4 hours PRN agitation  haloperidol    Injectable 5 milliGRAM(s) IntraMuscular once PRN severe agitation  LORazepam     Tablet 2 milliGRAM(s) Oral every 6 hours PRN anxiety/agitation  LORazepam   Injectable 2 milliGRAM(s) IntraMuscular once PRN agitation  
MEDICATIONS  (STANDING):  benztropine 2 milliGRAM(s) Oral at bedtime  fluPHENAZine 5 milliGRAM(s) Oral two times a day  lithium CR (ESKALITH-CR) 450 milliGRAM(s) Oral two times a day    MEDICATIONS  (PRN):  chlorproMAZINE    Injectable 50 milliGRAM(s) IntraMuscular once PRN severe agitation  chlorproMAZINE    Tablet 50 milliGRAM(s) Oral every 4 hours PRN agitation  diphenhydrAMINE 50 milliGRAM(s) Oral every 4 hours PRN agitation  diphenhydrAMINE   Injectable 50 milliGRAM(s) IntraMuscular once PRN severe agitation  gabapentin 400 milliGRAM(s) Oral three times a day PRN Anxiety  haloperidol     Tablet 5 milliGRAM(s) Oral every 4 hours PRN agitation  haloperidol    Injectable 5 milliGRAM(s) IntraMuscular once PRN severe agitation  LORazepam     Tablet 2 milliGRAM(s) Oral every 6 hours PRN anxiety/agitation  LORazepam   Injectable 2 milliGRAM(s) IntraMuscular once PRN Aggression  
MEDICATIONS  (STANDING):  benztropine 2 milliGRAM(s) Oral at bedtime  fluPHENAZine 5 milliGRAM(s) Oral two times a day  lithium CR (ESKALITH-CR) 450 milliGRAM(s) Oral two times a day    MEDICATIONS  (PRN):  chlorproMAZINE    Injectable 50 milliGRAM(s) IntraMuscular once PRN severe agitation  chlorproMAZINE    Tablet 50 milliGRAM(s) Oral every 4 hours PRN agitation  diphenhydrAMINE 50 milliGRAM(s) Oral every 4 hours PRN agitation  diphenhydrAMINE   Injectable 50 milliGRAM(s) IntraMuscular once PRN severe agitation  gabapentin 400 milliGRAM(s) Oral three times a day PRN Anxiety  haloperidol     Tablet 5 milliGRAM(s) Oral every 4 hours PRN agitation  haloperidol    Injectable 5 milliGRAM(s) IntraMuscular once PRN severe agitation  LORazepam     Tablet 2 milliGRAM(s) Oral every 6 hours PRN anxiety/agitation  LORazepam   Injectable 2 milliGRAM(s) IntraMuscular once PRN Aggression  
MEDICATIONS  (STANDING):  benztropine 2 milliGRAM(s) Oral at bedtime  lithium CR (ESKALITH-CR) 900 milliGRAM(s) Oral at bedtime  lithium CR (ESKALITH-CR) 450 milliGRAM(s) Oral daily  traZODone 50 milliGRAM(s) Oral at bedtime    MEDICATIONS  (PRN):  chlorproMAZINE    Injectable 50 milliGRAM(s) IntraMuscular once PRN severe agitation  chlorproMAZINE    Injectable 25 milliGRAM(s) IntraMuscular once PRN agitation  chlorproMAZINE    Tablet 50 milliGRAM(s) Oral every 4 hours PRN agitation  diphenhydrAMINE 50 milliGRAM(s) Oral every 4 hours PRN agitation  diphenhydrAMINE   Injectable 50 milliGRAM(s) IntraMuscular once PRN severe agitation  gabapentin 400 milliGRAM(s) Oral three times a day PRN Anxiety  haloperidol     Tablet 5 milliGRAM(s) Oral every 4 hours PRN agitation  haloperidol    Injectable 5 milliGRAM(s) IntraMuscular once PRN severe agitation  LORazepam     Tablet 2 milliGRAM(s) Oral every 6 hours PRN agitation/anxiety  LORazepam   Injectable 2 milliGRAM(s) IntraMuscular once PRN agitation  nicotine  Polacrilex Gum 2 milliGRAM(s) Oral every 3 hours PRN smoking cessation  
MEDICATIONS  (STANDING):  benztropine 2 milliGRAM(s) Oral at bedtime  lithium CR (ESKALITH-CR) 900 milliGRAM(s) Oral at bedtime  lithium CR (ESKALITH-CR) 450 milliGRAM(s) Oral daily  traZODone 50 milliGRAM(s) Oral at bedtime    MEDICATIONS  (PRN):  chlorproMAZINE    Injectable 50 milliGRAM(s) IntraMuscular once PRN severe agitation  chlorproMAZINE    Injectable 25 milliGRAM(s) IntraMuscular once PRN agitation  chlorproMAZINE    Tablet 50 milliGRAM(s) Oral every 4 hours PRN agitation  diphenhydrAMINE 50 milliGRAM(s) Oral every 4 hours PRN agitation  diphenhydrAMINE   Injectable 50 milliGRAM(s) IntraMuscular once PRN severe agitation  gabapentin 400 milliGRAM(s) Oral three times a day PRN Anxiety  haloperidol     Tablet 5 milliGRAM(s) Oral every 4 hours PRN agitation  haloperidol    Injectable 5 milliGRAM(s) IntraMuscular once PRN severe agitation  LORazepam     Tablet 2 milliGRAM(s) Oral every 6 hours PRN agitation/anxiety  LORazepam   Injectable 2 milliGRAM(s) IntraMuscular once PRN agitation  nicotine  Polacrilex Gum 2 milliGRAM(s) Oral every 3 hours PRN smoking cessation  
MEDICATIONS  (STANDING):  benztropine 2 milliGRAM(s) Oral at bedtime  fluPHENAZine 5 milliGRAM(s) Oral two times a day  lithium CR (ESKALITH-CR) 450 milliGRAM(s) Oral two times a day    MEDICATIONS  (PRN):  chlorproMAZINE    Injectable 50 milliGRAM(s) IntraMuscular once PRN severe agitation  chlorproMAZINE    Tablet 50 milliGRAM(s) Oral every 4 hours PRN agitation  diphenhydrAMINE 50 milliGRAM(s) Oral every 4 hours PRN agitation  diphenhydrAMINE   Injectable 50 milliGRAM(s) IntraMuscular once PRN severe agitation  gabapentin 400 milliGRAM(s) Oral three times a day PRN Anxiety  haloperidol     Tablet 5 milliGRAM(s) Oral every 4 hours PRN agitation  haloperidol    Injectable 5 milliGRAM(s) IntraMuscular once PRN severe agitation  LORazepam     Tablet 2 milliGRAM(s) Oral every 4 hours PRN anxiety/agitation  LORazepam   Injectable 2 milliGRAM(s) IntraMuscular once PRN severe agitation  
MEDICATIONS  (STANDING):  benztropine 2 milliGRAM(s) Oral at bedtime  lithium CR (ESKALITH-CR) 450 milliGRAM(s) Oral two times a day    MEDICATIONS  (PRN):  chlorproMAZINE    Injectable 25 milliGRAM(s) IntraMuscular once PRN agitation  chlorproMAZINE    Injectable 50 milliGRAM(s) IntraMuscular once PRN severe agitation  chlorproMAZINE    Tablet 50 milliGRAM(s) Oral every 4 hours PRN agitation  diphenhydrAMINE 50 milliGRAM(s) Oral every 4 hours PRN agitation  diphenhydrAMINE   Injectable 50 milliGRAM(s) IntraMuscular once PRN severe agitation  gabapentin 400 milliGRAM(s) Oral three times a day PRN Anxiety  haloperidol     Tablet 5 milliGRAM(s) Oral every 4 hours PRN agitation  haloperidol    Injectable 5 milliGRAM(s) IntraMuscular once PRN severe agitation  LORazepam     Tablet 2 milliGRAM(s) Oral every 6 hours PRN anxiety/agitation  LORazepam   Injectable 2 milliGRAM(s) IntraMuscular once PRN agitation  
MEDICATIONS  (STANDING):  benztropine 2 milliGRAM(s) Oral at bedtime  fluPHENAZine 5 milliGRAM(s) Oral two times a day  lithium CR (ESKALITH-CR) 450 milliGRAM(s) Oral two times a day    MEDICATIONS  (PRN):  chlorproMAZINE    Injectable 50 milliGRAM(s) IntraMuscular once PRN severe agitation  chlorproMAZINE    Tablet 50 milliGRAM(s) Oral every 4 hours PRN agitation  diphenhydrAMINE 50 milliGRAM(s) Oral every 4 hours PRN agitation  diphenhydrAMINE   Injectable 50 milliGRAM(s) IntraMuscular once PRN severe agitation  gabapentin 400 milliGRAM(s) Oral three times a day PRN Anxiety  haloperidol     Tablet 5 milliGRAM(s) Oral every 4 hours PRN agitation  haloperidol    Injectable 5 milliGRAM(s) IntraMuscular once PRN severe agitation  LORazepam     Tablet 2 milliGRAM(s) Oral every 4 hours PRN anxiety/agitation  LORazepam   Injectable 2 milliGRAM(s) IntraMuscular once PRN severe agitation  
MEDICATIONS  (STANDING):  benztropine 2 milliGRAM(s) Oral at bedtime  fluPHENAZine 5 milliGRAM(s) Oral two times a day  lithium CR (ESKALITH-CR) 450 milliGRAM(s) Oral two times a day    MEDICATIONS  (PRN):  chlorproMAZINE    Injectable 50 milliGRAM(s) IntraMuscular once PRN severe agitation  chlorproMAZINE    Tablet 50 milliGRAM(s) Oral every 4 hours PRN agitation  diphenhydrAMINE 50 milliGRAM(s) Oral every 4 hours PRN agitation  diphenhydrAMINE   Injectable 50 milliGRAM(s) IntraMuscular once PRN severe agitation  gabapentin 400 milliGRAM(s) Oral three times a day PRN Anxiety  haloperidol     Tablet 5 milliGRAM(s) Oral every 4 hours PRN agitation  haloperidol    Injectable 5 milliGRAM(s) IntraMuscular once PRN severe agitation  LORazepam     Tablet 2 milliGRAM(s) Oral every 6 hours PRN anxiety/agitation  LORazepam   Injectable 2 milliGRAM(s) IntraMuscular once PRN Aggression  
MEDICATIONS  (STANDING):  benztropine 2 milliGRAM(s) Oral at bedtime  fluPHENAZine 5 milliGRAM(s) Oral two times a day  lithium CR (ESKALITH-CR) 450 milliGRAM(s) Oral two times a day    MEDICATIONS  (PRN):  chlorproMAZINE    Injectable 50 milliGRAM(s) IntraMuscular once PRN severe agitation  chlorproMAZINE    Tablet 50 milliGRAM(s) Oral every 4 hours PRN agitation  diphenhydrAMINE 50 milliGRAM(s) Oral every 4 hours PRN agitation  diphenhydrAMINE   Injectable 50 milliGRAM(s) IntraMuscular once PRN severe agitation  gabapentin 400 milliGRAM(s) Oral three times a day PRN Anxiety  haloperidol     Tablet 5 milliGRAM(s) Oral every 4 hours PRN agitation  haloperidol    Injectable 5 milliGRAM(s) IntraMuscular once PRN severe agitation  LORazepam     Tablet 2 milliGRAM(s) Oral every 6 hours PRN anxiety/agitation  LORazepam   Injectable 2 milliGRAM(s) IntraMuscular once PRN Aggression  
MEDICATIONS  (STANDING):  benztropine 2 milliGRAM(s) Oral at bedtime  fluPHENAZine decanoate Injectable, Long Acting 37.5 milliGRAM(s) IntraMuscular once  lithium CR (ESKALITH-CR) 450 milliGRAM(s) Oral two times a day    MEDICATIONS  (PRN):  chlorproMAZINE    Injectable 50 milliGRAM(s) IntraMuscular once PRN severe agitation  chlorproMAZINE    Tablet 50 milliGRAM(s) Oral every 4 hours PRN agitation  diphenhydrAMINE 50 milliGRAM(s) Oral every 4 hours PRN agitation  diphenhydrAMINE   Injectable 50 milliGRAM(s) IntraMuscular once PRN severe agitation  gabapentin 400 milliGRAM(s) Oral three times a day PRN Anxiety  haloperidol     Tablet 5 milliGRAM(s) Oral every 4 hours PRN agitation  haloperidol    Injectable 5 milliGRAM(s) IntraMuscular once PRN severe agitation  LORazepam     Tablet 2 milliGRAM(s) Oral every 6 hours PRN anxiety/agitation  LORazepam   Injectable 2 milliGRAM(s) IntraMuscular once PRN Aggression  
MEDICATIONS  (STANDING):  benztropine 2 milliGRAM(s) Oral at bedtime  lithium CR (ESKALITH-CR) 900 milliGRAM(s) Oral at bedtime  lithium CR (ESKALITH-CR) 450 milliGRAM(s) Oral daily  traZODone 50 milliGRAM(s) Oral at bedtime    MEDICATIONS  (PRN):  chlorproMAZINE    Injectable 50 milliGRAM(s) IntraMuscular once PRN severe agitation  chlorproMAZINE    Injectable 25 milliGRAM(s) IntraMuscular once PRN agitation  chlorproMAZINE    Tablet 50 milliGRAM(s) Oral every 4 hours PRN agitation  diphenhydrAMINE 50 milliGRAM(s) Oral every 4 hours PRN agitation  diphenhydrAMINE   Injectable 50 milliGRAM(s) IntraMuscular once PRN severe agitation  gabapentin 400 milliGRAM(s) Oral three times a day PRN Anxiety  haloperidol     Tablet 5 milliGRAM(s) Oral every 4 hours PRN agitation  haloperidol    Injectable 5 milliGRAM(s) IntraMuscular once PRN severe agitation  LORazepam     Tablet 2 milliGRAM(s) Oral every 6 hours PRN anxiety/agitation  LORazepam   Injectable 2 milliGRAM(s) IntraMuscular once PRN agitation  
MEDICATIONS  (STANDING):  benztropine 2 milliGRAM(s) Oral at bedtime  lithium CR (ESKALITH-CR) 450 milliGRAM(s) Oral two times a day    MEDICATIONS  (PRN):  chlorproMAZINE    Injectable 50 milliGRAM(s) IntraMuscular once PRN severe agitation  chlorproMAZINE    Injectable 25 milliGRAM(s) IntraMuscular once PRN agitation  chlorproMAZINE    Tablet 50 milliGRAM(s) Oral every 4 hours PRN agitation  diphenhydrAMINE 50 milliGRAM(s) Oral every 4 hours PRN agitation  diphenhydrAMINE   Injectable 50 milliGRAM(s) IntraMuscular once PRN severe agitation  gabapentin 400 milliGRAM(s) Oral three times a day PRN Anxiety  haloperidol     Tablet 5 milliGRAM(s) Oral every 4 hours PRN agitation  haloperidol    Injectable 5 milliGRAM(s) IntraMuscular once PRN severe agitation  LORazepam     Tablet 2 milliGRAM(s) Oral every 6 hours PRN anxiety/agitation  LORazepam   Injectable 2 milliGRAM(s) IntraMuscular once PRN agitation  
MEDICATIONS  (STANDING):  benztropine 2 milliGRAM(s) Oral at bedtime  fluPHENAZine 5 milliGRAM(s) Oral two times a day  lithium CR (ESKALITH-CR) 450 milliGRAM(s) Oral two times a day    MEDICATIONS  (PRN):  chlorproMAZINE    Injectable 50 milliGRAM(s) IntraMuscular once PRN severe agitation  chlorproMAZINE    Tablet 50 milliGRAM(s) Oral every 4 hours PRN agitation  diphenhydrAMINE 50 milliGRAM(s) Oral every 4 hours PRN agitation  diphenhydrAMINE   Injectable 50 milliGRAM(s) IntraMuscular once PRN severe agitation  gabapentin 400 milliGRAM(s) Oral three times a day PRN Anxiety  haloperidol     Tablet 5 milliGRAM(s) Oral every 4 hours PRN agitation  haloperidol    Injectable 5 milliGRAM(s) IntraMuscular once PRN severe agitation  LORazepam     Tablet 2 milliGRAM(s) Oral every 6 hours PRN anxiety/agitation  LORazepam   Injectable 2 milliGRAM(s) IntraMuscular once PRN Aggression
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
## 2021-05-11 NOTE — BH INPATIENT PSYCHIATRY PROGRESS NOTE - NSTXDCOTHRDATEEST_PSY_ALL_CORE
15-Apr-2021

## 2021-05-11 NOTE — BH INPATIENT PSYCHIATRY PROGRESS NOTE - NSBHCONTPROVIDER_PSY_ALL_CORE
Yes...

## 2021-05-11 NOTE — BH CHART NOTE - NSEVENTNOTEFT_PSY_ALL_CORE
Patient seen for dc  court petitioned granted.  He Denies suicidal thoughts no plan or intent. Denies HI/AVH, delusions, or other symptoms of psychotic process are reported at this time.  He is encouraged to abstain from illicit substances, as these can directly worsen his mood and symptoms. He has remained compliant with medications, no adverse effects noted.  Medication teaching, risk/benefits of R[place] discussed, patient is encouraged to follow up with monthly injections. He received Prolixin  Deconate 37.5mg during course of treatment, next injection on 5/18/21. He denied any suicidal or homicidal ideations throughout hospitalization. He  will be discharged today to home and outpatient follow up.   Patient left under no distress.

## 2021-05-11 NOTE — BH INPATIENT PSYCHIATRY PROGRESS NOTE - NSTXPSYCHOGOAL_PSY_ALL_CORE
Will be able to report experiencing hallucinations to staff
Will verbalize 1 strategy to successfully cope with psychotic symptoms
Will be able to report experiencing hallucinations to staff
Will verbalize 1 strategy to successfully cope with psychotic symptoms
Will ask for PRN medication to manage hallucinations
Will verbalize 1 strategy to successfully cope with psychotic symptoms
Will be able to report experiencing hallucinations to staff
Will ask for PRN medication to manage hallucinations
Will be able to report experiencing hallucinations to staff
Will be able to report experiencing hallucinations to staff
Will verbalize 1 strategy to successfully cope with psychotic symptoms
Will ask for PRN medication to manage hallucinations
Will be able to report experiencing hallucinations to staff
Will verbalize 1 strategy to successfully cope with psychotic symptoms
Will ask for PRN medication to manage hallucinations

## 2021-05-11 NOTE — BH INPATIENT PSYCHIATRY PROGRESS NOTE - NSBHMSETHTASSOC_PSY_A_CORE
Loose

## 2021-05-11 NOTE — BH INPATIENT PSYCHIATRY PROGRESS NOTE - NSTXVIOLNTDATETRGT_PSY_ALL_CORE
22-Apr-2021
22-Apr-2021
29-Apr-2021
29-Apr-2021
22-Apr-2021
22-Apr-2021
05-May-2021
12-May-2021
29-Apr-2021
22-Apr-2021
22-Apr-2021
13-May-2021
22-Apr-2021
29-Apr-2021
22-Apr-2021
13-May-2021
22-Apr-2021
13-May-2021
29-Apr-2021

## 2021-05-11 NOTE — BH INPATIENT PSYCHIATRY PROGRESS NOTE - NSTXVIOLNTPROGRES_PSY_ALL_CORE
No Change
Improving
Met - goal discontinued
Met - goal discontinued
No Change
Improving
No Change
Worsening
Met - goal discontinued
No Change
Improving
No Change
Worsening
No Change
No Change
Worsening
Improving
Met - goal discontinued
No Change
Met - goal discontinued
Worsening
No Change
Worsening

## 2021-05-11 NOTE — BH INPATIENT PSYCHIATRY PROGRESS NOTE - NSDCCRITERIA_PSY_ALL_CORE
Symptom management, safety planning, care coordination
Symptom management, safety planning, care coordination  [facility] referral
Symptom management, safety planning, care coordination
Symptom management, safety planning, care coordination  [facility] referral
Symptom management, safety planning, care coordination

## 2021-05-11 NOTE — BH PSYCHOLOGY - GROUP THERAPY NOTE - NSBHPSYCHOLPARTICIPCOMMENT_PSY_A_CORE FT
Pt participated independently 
Pt participated independently 
Pt actively participated. His comments were tangential but he remained in behavioral control.

## 2021-05-11 NOTE — BH INPATIENT PSYCHIATRY PROGRESS NOTE - NSTXMEDICDATETRGT_PSY_ALL_CORE
06-May-2021
12-May-2021
22-Apr-2021
06-May-2021
22-Apr-2021
13-May-2021
29-Apr-2021
22-Apr-2021
29-Apr-2021
29-Apr-2021
13-May-2021
06-May-2021
06-May-2021
13-May-2021
22-Apr-2021
22-Apr-2021
29-Apr-2021
22-Apr-2021
06-May-2021
05-May-2021

## 2021-05-11 NOTE — BH INPATIENT PSYCHIATRY PROGRESS NOTE - OTHER
Occasionally mumbles  denies AH but appears internally preoccupied and paranoid Patient still fidgety

## 2021-05-11 NOTE — BH INPATIENT PSYCHIATRY PROGRESS NOTE - NSTXDCOTHRDATETRGT_PSY_ALL_CORE
22-Apr-2021

## 2021-05-11 NOTE — BH INPATIENT PSYCHIATRY PROGRESS NOTE - NSICDXBHPRIMARYDX_PSY_ALL_CORE
Schizophrenia   F20.9  

## 2021-05-11 NOTE — BH PSYCHOLOGY - GROUP THERAPY NOTE - NSPSYCHOLGRPCOGINT_PSY_A_CORE
group members provided support/group members suggested positive behaviors/mindfulness skills taught/relaxation skills practiced/other..
group members provided support/group members suggested positive behaviors/mindfulness skills taught/relaxation skills practiced/other..
other..

## 2021-05-11 NOTE — BH INPATIENT PSYCHIATRY PROGRESS NOTE - NSBHPSYCHOLCOGORIENT_PSY_A_CORE
Oriented to time, place, person, situation

## 2021-05-11 NOTE — BH INPATIENT PSYCHIATRY PROGRESS NOTE - NSTXVIOLNTDATENEW_PSY_ALL_CORE
06-May-2021
29-Apr-2021
29-Apr-2021
06-May-2021
29-Apr-2021
06-May-2021
29-Apr-2021
29-Apr-2021

## 2021-05-11 NOTE — BH INPATIENT PSYCHIATRY PROGRESS NOTE - MSE OPTIONS
Structured MSE

## 2021-05-11 NOTE — BH DISCHARGE NOTE NURSING/SOCIAL WORK/PSYCH REHAB - NSDCPRGOAL_PSY_ALL_CORE
Pt is being discharged based on mental health hygiene court’s decision. During this hospitalization, pt has been partially complaint with medication. Pt has verbally agreed to comply with medication post-discharge. Pt has not verbalized benefits of medication compliance.   Pt showed good behavioral control towards his discharge. Pt remains to be labile, internally preoccupied, sexually preoccupied and disorganized. Pt denies SI, HI, AH and VH.  Pt remains to have poor insight into his illness and poor judgement.   During this hospitalization, pt showed 30% of group attendance. During the group session, pt was unable to tolerate group structure, actively participated with relevant feedback. Pt was visible on the unit, constantly pacing around nursing station, interacts well with selective peers. Pt showed poor ADLs.   Pt was participated in patient safety plan. Pt refused press ganey survey.

## 2021-05-11 NOTE — BH PSYCHOLOGY - GROUP THERAPY NOTE - NSBHPSYCHOLGRPTYPE_PSY_A_CORE
Cognitive Behavioral Coping Skills

## 2021-05-11 NOTE — BH PSYCHOLOGY - GROUP THERAPY NOTE - NSPSYCHOLGRPCOGPT_PSY_A_CORE
other...
participated in exercise/shared negative coping experience/shared positive coping experience/thought disordered/other...
other...

## 2021-05-11 NOTE — BH INPATIENT PSYCHIATRY PROGRESS NOTE - NSTXMEDICGOAL_PSY_ALL_CORE
Take all medications as prescribed
Be able to describe the benefit of medication/treatment
Take all medications as prescribed
Be able to describe the benefit of medication/treatment
Take all medications as prescribed

## 2021-05-11 NOTE — BH PSYCHOLOGY - GROUP THERAPY NOTE - NSBHPSYCHOLASSESSPROV_PSY_A_CORE
Licensed Staff Psychologist and Trainee
Licensed Staff Psychologist
Licensed Staff Psychologist and Trainee

## 2021-05-11 NOTE — BH INPATIENT PSYCHIATRY PROGRESS NOTE - NSBHMSEBODY_PSY_A_CORE
FSH elevated at 88.7 in post menopausal range. Will treat patient's hot flashes with Estradiol 0.0375 mg/24 hr weekly patch. Patient follow up in 2-4 weeks. History of Hysterectomy in 2004.   
Average build

## 2021-05-11 NOTE — BH INPATIENT PSYCHIATRY PROGRESS NOTE - NSBHMSETHTPROC_PSY_A_CORE
Disorganized/Circumstantial/Perseverative/Illogical/Impaired reasoning
Disorganized/Circumstantial/Impaired reasoning
Disorganized/Circumstantial/Perseverative/Illogical/Impaired reasoning
Disorganized/Circumstantial/Impaired reasoning
Disorganized/Circumstantial/Perseverative/Illogical/Impaired reasoning

## 2021-05-11 NOTE — BH INPATIENT PSYCHIATRY PROGRESS NOTE - NSBHADMITMEDEDUDETAILS_PSY_A_CORE FT
Symptom stabilization

## 2021-05-11 NOTE — BH INPATIENT PSYCHIATRY PROGRESS NOTE - NSBHADMITMEDEDUDETAILS_A_CORE FT
Risk and benefits discussed

## 2021-05-11 NOTE — BH PSYCHOLOGY - GROUP THERAPY NOTE - NSBHPSYCHOLRESPCOMMENT_PSY_A_CORE FT
Pt appeared adequately groomed and casually dressed. Pt appeared very engaged in group discussion as evidenced by his willingness to meaningfully engage in the group topic. During check-in, Pt expressed that he would like to visit the [facility] in [place] as he feels it would be peaceful. Pt expressed that forming a strong support system is important in taking care of oneself. Pt also related to the notion that emotions are neither right or wrong but that everyone feels them and they are normal. Pt agreed to read from the worksheet. Speech was WNL. PT was oriented X3. PT was appropriate with others.    
Pt appeared adequately groomed and casually dressed. Pt appeared engaged in group discussion as evidenced by his willingness to meaningfully engage in the group topic. During check-in, Pt expressed that he felt happy when people gave him money and food when he was experiencing homelessness, noting that the generosity of others made him feel good. Pt shared that he finds it difficult to trust others, and agreed with what other members were sharing. Pt was quiet during much of group, but listened attentively throughout. Pt agreed to read from the worksheet. Speech was WNL. PT was oriented X3. PT was appropriate with others.

## 2021-05-11 NOTE — BH INPATIENT PSYCHIATRY PROGRESS NOTE - NSBHCONSBHPROVDETAILS_PSY_A_CORE  FT
Personal collateral from patient's mother (Abeba) [phone]
Personal collateral from patient's mother (Abeba) [phone]
Personal collateral from patient's mother (Abeba) [phone]
Personal collateral from patient's mother (Abeba) [phone]
Personal collateral from patient's mother (Abeba) [phone]
Personal collateral from patient's mother (Abeba) [phone]
Personal collateral from patient's mother (Abeba) [phone]
Personal collateral from patient's mother (Abeba) [phone]
Personal collateral from patient's mother (Abeba) [phone]
Personal collateral from patient's mother (Abeba) [phone]
Personal collateral from patient's mother (Abeba) [phone]
Personal collateral from patient's mother (Abeba) [phone]
Personal collateral from patient's mother (Abeba) [phone]
Personal collateral from patient's mother (Abeba) [phone]
Personal collateral from patient's mother (Abeba) [phone]
Personal collateral from patient's mother (Abeba) [phone]

## 2021-05-11 NOTE — BH DISCHARGE NOTE NURSING/SOCIAL WORK/PSYCH REHAB - PATIENT PORTAL LINK FT
You can access the FollowMyHealth Patient Portal offered by [facility] by registering at the following website: [email or web address]. By joining [facility]’s FollowMyHealth portal, you will also be able to view your health information using other applications (apps) compatible with our system.

## 2021-05-11 NOTE — BH PSYCHOLOGY - GROUP THERAPY NOTE - NSPSYCHOLGRPCOGPT_PSY_A_CORE FT
Patient attended Cognitive Behavioral Therapy Group. The group started with a brief check-in during which Pts were asked to share a place they would like to travel to and why. The group then focused on discussing healthy perspectives on emotions, particularly the fact that emotions do not last forever and are neither good nor bad they simply exist. Group members also discussed ways to engage in self-care. Pt’s shared personal examples and how the topic related to their own lives.  explained concepts, reinforced participation, and engaged patients in the discussion.  
Patient attended Cognitive Behavioral Therapy Group. The group started with a brief check-in during which Pts were asked to share a positive moment over the last year when they were feeling happy. The group then focused on discussing important opposites to balance, such as mastering something by one’s self and asking for help from others, being open with others and keeping certain things private, and taking things from others while also giving to others. Additionally, group members discussed the importance of balanced sleeping, eating, and the impacts of taking care of their physical health on their emotional health.  explained concepts, reinforced participation, and engaged patients in the discussion.  
Patient attended Cognitive Behavioral Therapy Group. The group started with a brief check in and mindfulness /relaxation exercise. The group then focused on the topic of distress tolerance and self care. Patients discussed the concept of distress tolerance and shared examples of healthy ways to cope with distress and ways to engage in healthy self-care (balanced sleep, healthy eating etc). The  explained concepts, reinforced participation, and engaged patients in the discussion.  The group concluded with a checkout and discussion of daily goals.

## 2021-05-11 NOTE — BH INPATIENT PSYCHIATRY PROGRESS NOTE - NSBHMSEBEHAV_PSY_A_CORE
Cooperative

## 2021-05-11 NOTE — BH INPATIENT PSYCHIATRY PROGRESS NOTE - GENERAL APPEARANCE
No deformities present

## 2021-05-11 NOTE — BH INPATIENT PSYCHIATRY PROGRESS NOTE - PRN MEDS
MEDICATIONS  (PRN):  chlorproMAZINE    Injectable 50 milliGRAM(s) IntraMuscular once PRN severe agitation  chlorproMAZINE    Injectable 25 milliGRAM(s) IntraMuscular once PRN agitation  chlorproMAZINE    Tablet 50 milliGRAM(s) Oral every 4 hours PRN agitation  diphenhydrAMINE 50 milliGRAM(s) Oral every 4 hours PRN agitation  diphenhydrAMINE   Injectable 50 milliGRAM(s) IntraMuscular once PRN severe agitation  gabapentin 400 milliGRAM(s) Oral three times a day PRN Anxiety  haloperidol     Tablet 5 milliGRAM(s) Oral every 4 hours PRN agitation  haloperidol    Injectable 5 milliGRAM(s) IntraMuscular once PRN severe agitation  LORazepam     Tablet 2 milliGRAM(s) Oral every 6 hours PRN agitaion/anxiety  LORazepam   Injectable 2 milliGRAM(s) IntraMuscular once PRN agitation  nicotine  Polacrilex Gum 2 milliGRAM(s) Oral every 3 hours PRN smoking cessation  
MEDICATIONS  (PRN):  chlorproMAZINE    Injectable 50 milliGRAM(s) IntraMuscular once PRN severe agitation  chlorproMAZINE    Injectable 25 milliGRAM(s) IntraMuscular once PRN agitation  chlorproMAZINE    Tablet 50 milliGRAM(s) Oral every 4 hours PRN agitation  diphenhydrAMINE 50 milliGRAM(s) Oral every 4 hours PRN agitation  diphenhydrAMINE   Injectable 50 milliGRAM(s) IntraMuscular once PRN severe agitation  gabapentin 400 milliGRAM(s) Oral three times a day PRN Anxiety  haloperidol     Tablet 5 milliGRAM(s) Oral every 4 hours PRN agitation  haloperidol    Injectable 5 milliGRAM(s) IntraMuscular once PRN severe agitation  LORazepam     Tablet 2 milliGRAM(s) Oral every 6 hours PRN agitation/anxiety  LORazepam   Injectable 2 milliGRAM(s) IntraMuscular once PRN agitation  nicotine  Polacrilex Gum 2 milliGRAM(s) Oral every 3 hours PRN smoking cessation  
MEDICATIONS  (PRN):  chlorproMAZINE    Injectable 50 milliGRAM(s) IntraMuscular once PRN severe agitation  chlorproMAZINE    Tablet 50 milliGRAM(s) Oral every 4 hours PRN agitation  diphenhydrAMINE 50 milliGRAM(s) Oral every 4 hours PRN agitation  diphenhydrAMINE   Injectable 50 milliGRAM(s) IntraMuscular once PRN severe agitation  gabapentin 400 milliGRAM(s) Oral three times a day PRN Anxiety  haloperidol     Tablet 5 milliGRAM(s) Oral every 4 hours PRN agitation  haloperidol    Injectable 5 milliGRAM(s) IntraMuscular once PRN severe agitation  LORazepam     Tablet 2 milliGRAM(s) Oral every 6 hours PRN anxiety/agitation  LORazepam   Injectable 2 milliGRAM(s) IntraMuscular once PRN Aggression  
MEDICATIONS  (PRN):  chlorproMAZINE    Injectable 50 milliGRAM(s) IntraMuscular once PRN severe agitation  chlorproMAZINE    Tablet 50 milliGRAM(s) Oral every 4 hours PRN agitation  diphenhydrAMINE 50 milliGRAM(s) Oral every 4 hours PRN agitation  diphenhydrAMINE   Injectable 50 milliGRAM(s) IntraMuscular once PRN severe agitation  gabapentin 400 milliGRAM(s) Oral three times a day PRN Anxiety  haloperidol     Tablet 5 milliGRAM(s) Oral every 4 hours PRN agitation  haloperidol    Injectable 5 milliGRAM(s) IntraMuscular once PRN severe agitation  LORazepam     Tablet 2 milliGRAM(s) Oral every 6 hours PRN anxiety/agitation  LORazepam   Injectable 2 milliGRAM(s) IntraMuscular once PRN Aggression  
MEDICATIONS  (PRN):  chlorproMAZINE    Injectable 50 milliGRAM(s) IntraMuscular once PRN severe agitation  chlorproMAZINE    Tablet 50 milliGRAM(s) Oral every 4 hours PRN agitation  diphenhydrAMINE 50 milliGRAM(s) Oral every 4 hours PRN agitation  diphenhydrAMINE   Injectable 50 milliGRAM(s) IntraMuscular once PRN severe agitation  gabapentin 400 milliGRAM(s) Oral three times a day PRN Anxiety  haloperidol     Tablet 5 milliGRAM(s) Oral every 4 hours PRN agitation  haloperidol    Injectable 5 milliGRAM(s) IntraMuscular once PRN severe agitation  LORazepam     Tablet 2 milliGRAM(s) Oral every 4 hours PRN anxiety/agitation  LORazepam   Injectable 2 milliGRAM(s) IntraMuscular once PRN severe agitation  
MEDICATIONS  (PRN):  chlorproMAZINE    Injectable 50 milliGRAM(s) IntraMuscular once PRN severe agitation  chlorproMAZINE    Injectable 25 milliGRAM(s) IntraMuscular once PRN agitation  chlorproMAZINE    Tablet 50 milliGRAM(s) Oral every 4 hours PRN agitation  diphenhydrAMINE 50 milliGRAM(s) Oral every 4 hours PRN agitation  diphenhydrAMINE   Injectable 50 milliGRAM(s) IntraMuscular once PRN severe agitation  gabapentin 400 milliGRAM(s) Oral three times a day PRN Anxiety  haloperidol     Tablet 5 milliGRAM(s) Oral every 4 hours PRN agitation  haloperidol    Injectable 5 milliGRAM(s) IntraMuscular once PRN severe agitation  LORazepam     Tablet 2 milliGRAM(s) Oral every 6 hours PRN agitation/anxiety  LORazepam   Injectable 2 milliGRAM(s) IntraMuscular once PRN agitation  nicotine  Polacrilex Gum 2 milliGRAM(s) Oral every 3 hours PRN smoking cessation  
MEDICATIONS  (PRN):  chlorproMAZINE    Injectable 50 milliGRAM(s) IntraMuscular once PRN severe agitation  chlorproMAZINE    Injectable 25 milliGRAM(s) IntraMuscular once PRN agitation  chlorproMAZINE    Tablet 50 milliGRAM(s) Oral every 4 hours PRN agitation  diphenhydrAMINE 50 milliGRAM(s) Oral every 4 hours PRN agitation  diphenhydrAMINE   Injectable 50 milliGRAM(s) IntraMuscular once PRN severe agitation  gabapentin 400 milliGRAM(s) Oral three times a day PRN Anxiety  haloperidol     Tablet 5 milliGRAM(s) Oral every 4 hours PRN agitation  haloperidol    Injectable 5 milliGRAM(s) IntraMuscular once PRN severe agitation  LORazepam     Tablet 2 milliGRAM(s) Oral every 6 hours PRN anxiety/agitation  LORazepam   Injectable 2 milliGRAM(s) IntraMuscular once PRN agitation  
MEDICATIONS  (PRN):  chlorproMAZINE    Injectable 50 milliGRAM(s) IntraMuscular once PRN severe agitation  chlorproMAZINE    Tablet 50 milliGRAM(s) Oral every 4 hours PRN agitation  diphenhydrAMINE 50 milliGRAM(s) Oral every 4 hours PRN agitation  diphenhydrAMINE   Injectable 50 milliGRAM(s) IntraMuscular once PRN severe agitation  gabapentin 400 milliGRAM(s) Oral three times a day PRN Anxiety  haloperidol     Tablet 5 milliGRAM(s) Oral every 4 hours PRN agitation  haloperidol    Injectable 5 milliGRAM(s) IntraMuscular once PRN severe agitation  LORazepam     Tablet 2 milliGRAM(s) Oral every 4 hours PRN anxiety/agitation  LORazepam   Injectable 2 milliGRAM(s) IntraMuscular once PRN severe agitation  
MEDICATIONS  (PRN):  chlorproMAZINE    Injectable 50 milliGRAM(s) IntraMuscular once PRN severe agitation  chlorproMAZINE    Tablet 50 milliGRAM(s) Oral every 4 hours PRN agitation  diphenhydrAMINE 50 milliGRAM(s) Oral every 4 hours PRN agitation  diphenhydrAMINE   Injectable 50 milliGRAM(s) IntraMuscular once PRN severe agitation  gabapentin 400 milliGRAM(s) Oral three times a day PRN Anxiety  haloperidol     Tablet 5 milliGRAM(s) Oral every 4 hours PRN agitation  haloperidol    Injectable 5 milliGRAM(s) IntraMuscular once PRN severe agitation  LORazepam     Tablet 2 milliGRAM(s) Oral every 4 hours PRN anxiety/agitation  LORazepam   Injectable 2 milliGRAM(s) IntraMuscular once PRN severe agitation  
MEDICATIONS  (PRN):  chlorproMAZINE    Injectable 50 milliGRAM(s) IntraMuscular once PRN severe agitation  chlorproMAZINE    Injectable 25 milliGRAM(s) IntraMuscular once PRN agitation  chlorproMAZINE    Tablet 50 milliGRAM(s) Oral every 4 hours PRN agitation  diphenhydrAMINE 50 milliGRAM(s) Oral every 4 hours PRN agitation  diphenhydrAMINE   Injectable 50 milliGRAM(s) IntraMuscular once PRN severe agitation  gabapentin 400 milliGRAM(s) Oral three times a day PRN Anxiety  haloperidol     Tablet 5 milliGRAM(s) Oral every 4 hours PRN agitation  haloperidol    Injectable 5 milliGRAM(s) IntraMuscular once PRN severe agitation  LORazepam     Tablet 2 milliGRAM(s) Oral every 6 hours PRN anxiety/agitation  LORazepam   Injectable 2 milliGRAM(s) IntraMuscular once PRN agitation  
MEDICATIONS  (PRN):  chlorproMAZINE    Injectable 50 milliGRAM(s) IntraMuscular once PRN severe agitation  chlorproMAZINE    Tablet 50 milliGRAM(s) Oral every 4 hours PRN agitation  diphenhydrAMINE 50 milliGRAM(s) Oral every 4 hours PRN agitation  diphenhydrAMINE   Injectable 50 milliGRAM(s) IntraMuscular once PRN severe agitation  gabapentin 400 milliGRAM(s) Oral three times a day PRN Anxiety  haloperidol     Tablet 5 milliGRAM(s) Oral every 4 hours PRN agitation  haloperidol    Injectable 5 milliGRAM(s) IntraMuscular once PRN severe agitation  LORazepam     Tablet 2 milliGRAM(s) Oral every 4 hours PRN anxiety/agitation  LORazepam   Injectable 2 milliGRAM(s) IntraMuscular once PRN severe agitation  
MEDICATIONS  (PRN):  chlorproMAZINE    Injectable 50 milliGRAM(s) IntraMuscular once PRN severe agitation  chlorproMAZINE    Tablet 50 milliGRAM(s) Oral every 4 hours PRN agitation  diphenhydrAMINE 50 milliGRAM(s) Oral every 4 hours PRN agitation  diphenhydrAMINE   Injectable 50 milliGRAM(s) IntraMuscular once PRN severe agitation  gabapentin 400 milliGRAM(s) Oral three times a day PRN Anxiety  haloperidol     Tablet 5 milliGRAM(s) Oral every 4 hours PRN agitation  haloperidol    Injectable 5 milliGRAM(s) IntraMuscular once PRN severe agitation  LORazepam     Tablet 2 milliGRAM(s) Oral every 6 hours PRN anxiety/agitation  LORazepam   Injectable 2 milliGRAM(s) IntraMuscular once PRN Aggression  
MEDICATIONS  (PRN):  chlorproMAZINE    Injectable 50 milliGRAM(s) IntraMuscular once PRN severe agitation  chlorproMAZINE    Injectable 25 milliGRAM(s) IntraMuscular once PRN agitation  chlorproMAZINE    Tablet 50 milliGRAM(s) Oral every 4 hours PRN agitation  diphenhydrAMINE 50 milliGRAM(s) Oral every 4 hours PRN agitation  diphenhydrAMINE   Injectable 50 milliGRAM(s) IntraMuscular once PRN severe agitation  gabapentin 400 milliGRAM(s) Oral three times a day PRN Anxiety  haloperidol     Tablet 5 milliGRAM(s) Oral every 4 hours PRN agitation  haloperidol    Injectable 5 milliGRAM(s) IntraMuscular once PRN severe agitation  LORazepam     Tablet 2 milliGRAM(s) Oral every 6 hours PRN agitation/anxiety  LORazepam   Injectable 2 milliGRAM(s) IntraMuscular once PRN agitation  nicotine  Polacrilex Gum 2 milliGRAM(s) Oral every 3 hours PRN smoking cessation  
MEDICATIONS  (PRN):  chlorproMAZINE    Injectable 50 milliGRAM(s) IntraMuscular once PRN severe agitation  chlorproMAZINE    Tablet 50 milliGRAM(s) Oral every 4 hours PRN agitation  diphenhydrAMINE 50 milliGRAM(s) Oral every 4 hours PRN agitation  diphenhydrAMINE   Injectable 50 milliGRAM(s) IntraMuscular once PRN severe agitation  gabapentin 400 milliGRAM(s) Oral three times a day PRN Anxiety  haloperidol     Tablet 5 milliGRAM(s) Oral every 4 hours PRN agitation  haloperidol    Injectable 5 milliGRAM(s) IntraMuscular once PRN severe agitation  LORazepam     Tablet 2 milliGRAM(s) Oral every 6 hours PRN anxiety/agitation  LORazepam   Injectable 2 milliGRAM(s) IntraMuscular once PRN Aggression  
MEDICATIONS  (PRN):  chlorproMAZINE    Injectable 50 milliGRAM(s) IntraMuscular once PRN severe agitation  chlorproMAZINE    Tablet 50 milliGRAM(s) Oral every 4 hours PRN agitation  diphenhydrAMINE 50 milliGRAM(s) Oral every 4 hours PRN agitation  diphenhydrAMINE   Injectable 50 milliGRAM(s) IntraMuscular once PRN severe agitation  gabapentin 400 milliGRAM(s) Oral three times a day PRN Anxiety  haloperidol     Tablet 5 milliGRAM(s) Oral every 4 hours PRN agitation  haloperidol    Injectable 5 milliGRAM(s) IntraMuscular once PRN severe agitation  LORazepam     Tablet 2 milliGRAM(s) Oral every 6 hours PRN anxiety/agitation  LORazepam   Injectable 2 milliGRAM(s) IntraMuscular once PRN Aggression  
MEDICATIONS  (PRN):  chlorproMAZINE    Injectable 50 milliGRAM(s) IntraMuscular once PRN severe agitation  chlorproMAZINE    Injectable 25 milliGRAM(s) IntraMuscular once PRN agitation  chlorproMAZINE    Tablet 50 milliGRAM(s) Oral every 4 hours PRN agitation  diphenhydrAMINE 50 milliGRAM(s) Oral every 4 hours PRN agitation  diphenhydrAMINE   Injectable 50 milliGRAM(s) IntraMuscular once PRN severe agitation  gabapentin 400 milliGRAM(s) Oral three times a day PRN Anxiety  haloperidol     Tablet 5 milliGRAM(s) Oral every 4 hours PRN agitation  haloperidol    Injectable 5 milliGRAM(s) IntraMuscular once PRN severe agitation  LORazepam     Tablet 2 milliGRAM(s) Oral every 6 hours PRN anxiety/agitation  LORazepam   Injectable 2 milliGRAM(s) IntraMuscular once PRN agitation  
MEDICATIONS  (PRN):  chlorproMAZINE    Injectable 50 milliGRAM(s) IntraMuscular once PRN severe agitation  chlorproMAZINE    Tablet 50 milliGRAM(s) Oral every 4 hours PRN agitation  diphenhydrAMINE 50 milliGRAM(s) Oral every 4 hours PRN agitation  diphenhydrAMINE   Injectable 50 milliGRAM(s) IntraMuscular once PRN severe agitation  gabapentin 400 milliGRAM(s) Oral three times a day PRN Anxiety  haloperidol     Tablet 5 milliGRAM(s) Oral every 4 hours PRN agitation  haloperidol    Injectable 5 milliGRAM(s) IntraMuscular once PRN severe agitation  LORazepam     Tablet 2 milliGRAM(s) Oral every 4 hours PRN anxiety/agitation  LORazepam   Injectable 2 milliGRAM(s) IntraMuscular once PRN severe agitation  
MEDICATIONS  (PRN):  chlorproMAZINE    Injectable 50 milliGRAM(s) IntraMuscular once PRN severe agitation  chlorproMAZINE    Injectable 25 milliGRAM(s) IntraMuscular once PRN agitation  chlorproMAZINE    Tablet 50 milliGRAM(s) Oral every 4 hours PRN agitation  diphenhydrAMINE 50 milliGRAM(s) Oral every 4 hours PRN agitation  diphenhydrAMINE   Injectable 50 milliGRAM(s) IntraMuscular once PRN severe agitation  gabapentin 400 milliGRAM(s) Oral three times a day PRN Anxiety  haloperidol     Tablet 5 milliGRAM(s) Oral every 4 hours PRN agitation  haloperidol    Injectable 5 milliGRAM(s) IntraMuscular once PRN severe agitation  LORazepam     Tablet 2 milliGRAM(s) Oral every 6 hours PRN anxiety/agitation  LORazepam   Injectable 2 milliGRAM(s) IntraMuscular once PRN agitation  
MEDICATIONS  (PRN):  chlorproMAZINE    Injectable 50 milliGRAM(s) IntraMuscular once PRN severe agitation  chlorproMAZINE    Injectable 25 milliGRAM(s) IntraMuscular once PRN agitation  chlorproMAZINE    Tablet 50 milliGRAM(s) Oral every 4 hours PRN agitation  diphenhydrAMINE 50 milliGRAM(s) Oral every 4 hours PRN agitation  diphenhydrAMINE   Injectable 50 milliGRAM(s) IntraMuscular once PRN severe agitation  gabapentin 400 milliGRAM(s) Oral three times a day PRN Anxiety  haloperidol     Tablet 5 milliGRAM(s) Oral every 4 hours PRN agitation  haloperidol    Injectable 5 milliGRAM(s) IntraMuscular once PRN severe agitation  LORazepam     Tablet 2 milliGRAM(s) Oral every 6 hours PRN agitation/anxiety  LORazepam   Injectable 2 milliGRAM(s) IntraMuscular once PRN agitation  nicotine  Polacrilex Gum 2 milliGRAM(s) Oral every 3 hours PRN smoking cessation  
MEDICATIONS  (PRN):  chlorproMAZINE    Injectable 25 milliGRAM(s) IntraMuscular once PRN agitation  chlorproMAZINE    Injectable 50 milliGRAM(s) IntraMuscular once PRN severe agitation  chlorproMAZINE    Tablet 50 milliGRAM(s) Oral every 4 hours PRN agitation  diphenhydrAMINE 50 milliGRAM(s) Oral every 4 hours PRN agitation  diphenhydrAMINE   Injectable 50 milliGRAM(s) IntraMuscular once PRN severe agitation  gabapentin 400 milliGRAM(s) Oral three times a day PRN Anxiety  haloperidol     Tablet 5 milliGRAM(s) Oral every 4 hours PRN agitation  haloperidol    Injectable 5 milliGRAM(s) IntraMuscular once PRN severe agitation  LORazepam     Tablet 2 milliGRAM(s) Oral every 6 hours PRN anxiety/agitation  LORazepam   Injectable 2 milliGRAM(s) IntraMuscular once PRN agitation  
MEDICATIONS  (PRN):  chlorproMAZINE    Injectable 50 milliGRAM(s) IntraMuscular once PRN severe agitation  chlorproMAZINE    Injectable 25 milliGRAM(s) IntraMuscular once PRN agitation  chlorproMAZINE    Tablet 50 milliGRAM(s) Oral every 4 hours PRN agitation  diphenhydrAMINE 50 milliGRAM(s) Oral every 4 hours PRN agitation  diphenhydrAMINE   Injectable 50 milliGRAM(s) IntraMuscular once PRN severe agitation  gabapentin 400 milliGRAM(s) Oral three times a day PRN Anxiety  haloperidol     Tablet 5 milliGRAM(s) Oral every 4 hours PRN agitation  haloperidol    Injectable 5 milliGRAM(s) IntraMuscular once PRN severe agitation  LORazepam     Tablet 2 milliGRAM(s) Oral every 6 hours PRN anxiety/agitation  LORazepam   Injectable 2 milliGRAM(s) IntraMuscular once PRN agitation  nicotine  Polacrilex Gum 2 milliGRAM(s) Oral every 3 hours PRN smoking cessation  
MEDICATIONS  (PRN):  chlorproMAZINE    Injectable 50 milliGRAM(s) IntraMuscular once PRN severe agitation  chlorproMAZINE    Tablet 50 milliGRAM(s) Oral every 4 hours PRN agitation  diphenhydrAMINE 50 milliGRAM(s) Oral every 4 hours PRN agitation  diphenhydrAMINE   Injectable 50 milliGRAM(s) IntraMuscular once PRN severe agitation  gabapentin 400 milliGRAM(s) Oral three times a day PRN Anxiety  haloperidol     Tablet 5 milliGRAM(s) Oral every 4 hours PRN agitation  haloperidol    Injectable 5 milliGRAM(s) IntraMuscular once PRN severe agitation  LORazepam     Tablet 2 milliGRAM(s) Oral every 6 hours PRN anxiety/agitation  LORazepam   Injectable 2 milliGRAM(s) IntraMuscular once PRN Aggression  
MEDICATIONS  (PRN):  chlorproMAZINE    Injectable 50 milliGRAM(s) IntraMuscular once PRN severe agitation  chlorproMAZINE    Injectable 25 milliGRAM(s) IntraMuscular once PRN agitation  chlorproMAZINE    Tablet 50 milliGRAM(s) Oral every 4 hours PRN agitation  diphenhydrAMINE 50 milliGRAM(s) Oral every 4 hours PRN agitation  diphenhydrAMINE   Injectable 50 milliGRAM(s) IntraMuscular once PRN severe agitation  gabapentin 400 milliGRAM(s) Oral three times a day PRN Anxiety  haloperidol     Tablet 5 milliGRAM(s) Oral every 4 hours PRN agitation  haloperidol    Injectable 5 milliGRAM(s) IntraMuscular once PRN severe agitation  LORazepam     Tablet 2 milliGRAM(s) Oral every 6 hours PRN agitation/anxiety  LORazepam   Injectable 2 milliGRAM(s) IntraMuscular once PRN agitation  nicotine  Polacrilex Gum 2 milliGRAM(s) Oral every 3 hours PRN smoking cessation

## 2021-05-11 NOTE — BH INPATIENT PSYCHIATRY PROGRESS NOTE - NSTXMEDICDATEEST_PSY_ALL_CORE
15-Apr-2021
28-Apr-2021
15-Apr-2021
14-Apr-2021
15-Apr-2021

## 2021-05-11 NOTE — BH INPATIENT PSYCHIATRY PROGRESS NOTE - NSBHASSESSSUMMFT_PSY_ALL_CORE
>Legal: 9.37  >Obs: Routine, no current SI.   >Psychiatric Meds: Increase Lithium 450mg daily, continue Lithium 900mg qhs,  Patient recently received Prolixin Dec 37.5 on 4/ 26/21 , Aristada 1064mg on 4/6/21. PPD: negative.  >Labs: Lithium level resulted 0.5 on 4/30. Lithium levels (05/07) 0.8.   >Medical: No acute concerns. Follow up with Medical team as needed  >Diet: Regular   >Social: milieu/structured therapy  >Treatment Interventions: Groups and Individual Therapy/CBT, Motivational counseling for substance abuse related issues.   >Dispo: STATE referral, had State Hearing. State application: IRF renewed and submitted to social work.   >Legal: 9.37  >Obs: Routine, no current SI.   >Psychiatric Meds: Increase Lithium 450mg daily, continue Lithium 900mg qhs,  Patient recently received Prolixin Dec 37.5 on 4/ 26/21 , Aristada 1064mg on 4/6/21. PPD: negative.  >Labs: Lithium level resulted 0.5 on 4/30. Lithium levels (05/07) 0.8.   >Medical: No acute concerns. Follow up with Medical team as needed  >Diet: Regular   >Social: milieu/structured therapy  >Treatment Interventions: Groups and Individual Therapy/CBT, Motivational counseling for substance abuse related issues.   >Dispo: STATE referral, had State Hearing. State application: IRF renewed and submitted to social work. Court today 05/11.

## 2021-05-11 NOTE — BH INPATIENT PSYCHIATRY PROGRESS NOTE - NSBHINPTBILLING_PSY_ALL_CORE
26149 - Inpatient Moderate Complexity
Patient is stable from peripheral vascular disease standpoint.  
93293 - Inpatient Moderate Complexity
09866 - Inpatient High Complexity
57709 - Inpatient High Complexity
41617 - Inpatient High Complexity
01608 - Inpatient Moderate Complexity
80929 - Inpatient Moderate Complexity
69128 - Inpatient Moderate Complexity
72243 - Inpatient Moderate Complexity
70555 - Inpatient Moderate Complexity
88962 - Inpatient Moderate Complexity
83696 - Inpatient High Complexity
24611 - Inpatient Low Complexity
57117 - Inpatient Moderate Complexity
81437 - Inpatient Low Complexity
98672 - Inpatient Moderate Complexity
00482 - Inpatient High Complexity
43134 - Inpatient Moderate Complexity
68021 - Inpatient Moderate Complexity
77368 - Inpatient Moderate Complexity
70385 - Inpatient Moderate Complexity
70885 - Inpatient High Complexity
00067 - Inpatient High Complexity

## 2021-05-11 NOTE — BH INPATIENT PSYCHIATRY PROGRESS NOTE - NSBHMETABOLICLABS_PSY_ALL_CORE
Labs within last 12 months

## 2021-05-11 NOTE — BH INPATIENT PSYCHIATRY PROGRESS NOTE - NSBHMSEMOOD_PSY_A_CORE
Anxious/Irritable

## 2021-05-11 NOTE — BH INPATIENT PSYCHIATRY PROGRESS NOTE - NSBHFUPINTERVALHXFT_PSY_A_CORE
Patient was discussed during multidisciplinary teams meeting. Today reports, "feeling optimistic and happy." Patient noted that he was feeling excited about going to court today and is "ready to leave." Patient noted that he plans to go to  after court to figure out housing. Denies any visual, auditory or sensory hallucinations. Denies SI/HI. DASA score 4.  Patient was discussed during multidisciplinary teams meeting. Patient has court today.  He  reports, "feeling optimistic and happy." Patient noted that he was feeling excited about going to court today and is "ready to leave." Patient noted that he plans to go to  after court to figure out housing. Denies any visual, auditory or sensory hallucinations. Denies SI/HI. DASA score 4.

## 2021-05-11 NOTE — BH INPATIENT PSYCHIATRY PROGRESS NOTE - NSTXDCOTHRPROGRES_PSY_ALL_CORE
No Change

## 2021-05-11 NOTE — BH INPATIENT PSYCHIATRY PROGRESS NOTE - NSTREATMENTCERTY_PSY_ALL_CORE

## 2021-05-11 NOTE — BH INPATIENT PSYCHIATRY PROGRESS NOTE - NSTXPSYCHOPROGRES_PSY_ALL_CORE
No Change
Improving
No Change
Improving
No Change
No Change
Improving
Improving
No Change
Improving
No Change
No Change
Improving

## 2021-05-11 NOTE — BH PSYCHOLOGY - GROUP THERAPY NOTE - NSPSYCHOLGRPCOGINT_PSY_A_CORE FT
Cognitive/behavioral therapy, Emotion regulation/coping skills taught, Psychoeducation  
Cognitive/behavioral therapy, Emotion regulation/coping skills taught, Psychoed
Cognitive/behavioral therapy, Emotion regulation/coping skills taught, Psychoed

## 2021-05-11 NOTE — BH DISCHARGE NOTE NURSING/SOCIAL WORK/PSYCH REHAB - DISCHARGE INSTRUCTIONS AFTERCARE APPOINTMENTS
In order to check the location, date, or time of your aftercare appointment, please refer to your Discharge Instructions Document given to you upon leaving the hospital.  If you have lost the instructions please call [phone]

## 2021-05-11 NOTE — BH INPATIENT PSYCHIATRY PROGRESS NOTE - NSBHMSEAFFQUAL_PSY_A_CORE
Irritable/Anxious

## 2021-05-11 NOTE — BH INPATIENT PSYCHIATRY PROGRESS NOTE - NSTXVIOLNTGOAL_PSY_ALL_CORE
Will decrease the number/duration/intensity of angry/aggressive outbursts
Will identify 2 situations that cause an aggressive response
Will decrease the number/duration/intensity of angry/aggressive outbursts
Will be able to express understanding of at least one trigger to their aggressive behavior
Will identify 2 situations that cause an aggressive response
Will decrease the number/duration/intensity of angry/aggressive outbursts
Other...
Will communicate an angry feeling in a controlled manner
Will decrease the number/duration/intensity of angry/aggressive outbursts
Other...
Will identify 2 situations that cause an aggressive response
Will decrease the number/duration/intensity of angry/aggressive outbursts
Will identify 2 situations that cause an aggressive response
Will decrease the number/duration/intensity of angry/aggressive outbursts
Other...
Will identify 2 situations that cause an aggressive response

## 2021-05-11 NOTE — BH PSYCHOLOGY - GROUP THERAPY NOTE - NSPSYCHOLGRPCOGGOAL_PSY_A_CORE FT
Decrease symptoms, Develop coping/emotion regulation skills, Psychoeducation

## 2021-05-11 NOTE — BH DISCHARGE NOTE NURSING/SOCIAL WORK/PSYCH REHAB - NSCDUDCCRISIS_PSY_A_CORE
[facility] Well  1 ([phone]) [facility]-WELL ([phone])  Text "WELL" to 51071  Website: www.uchoose/.[employer] [facility] 1 (634) 691-PAYQ (5449) Website: www.safehorizon.org/.National Suicide Prevention Lifeline 3 (996) 949-0121/.  [facility]  1 (654) LIFENET (155-6391)/.  [facility]  [phone] [place] [phone]   Hours:  Monday through Friday from 9 AM to 3 PM/.  [employer] - Veterans Crisis Line  6 [phone], Option 1

## 2021-05-11 NOTE — BH INPATIENT PSYCHIATRY PROGRESS NOTE - NSTXMEDICPROGRES_PSY_ALL_CORE
No Change
No Change
Improving
No Change
No Change
Improving
Improving
No Change
Improving
No Change
Improving
No Change

## 2021-05-11 NOTE — BH INPATIENT PSYCHIATRY PROGRESS NOTE - NSICDXBHSECONDARYDX_PSY_ALL_CORE
Crack cocaine use   F14.90  

## 2021-05-11 NOTE — BH INPATIENT PSYCHIATRY PROGRESS NOTE - NSBHFUPINTERVALCCFT_PSY_A_CORE
Psychosis/Andra/Aggression
Psychosis
Psychosis/Andra/Aggression
Psychosis
Psychosis
Psychosis and aggression
Psychosis/Andra/Aggression
Psychosis/Andra/Aggression
Psychosis
Psychosis and aggression
Psychosis
Psychosis/Andra/Aggression
Psychosis
Psychosis/Andra/Aggression
Psychosis and aggression
Psychosis

## 2021-05-11 NOTE — BH INPATIENT PSYCHIATRY PROGRESS NOTE - NSBHATTESTSEENBY_PSY_A_CORE
NP without Attending Psychiatrist
attending Psychiatrist without NP/Trainee
NP without Attending Psychiatrist
attending Psychiatrist without NP/Trainee
NP without Attending Psychiatrist

## 2021-05-11 NOTE — BH INPATIENT PSYCHIATRY PROGRESS NOTE - NSTXPROBPSYCHO_PSY_ALL_CORE
PSYCHOTIC SYMPTOMS

## 2021-05-11 NOTE — BH INPATIENT PSYCHIATRY PROGRESS NOTE - NSTXPROBMEDIC_PSY_ALL_CORE
MEDICATION/TREATMENT NON-COMPLIANCE

## 2021-05-11 NOTE — BH INPATIENT PSYCHIATRY PROGRESS NOTE - NSBHMSESPEECH_PSY_A_CORE
Normal volume, rate, productivity, spontaneity and articulation

## 2021-05-11 NOTE — BH INPATIENT PSYCHIATRY PROGRESS NOTE - NSBHMSEIMPULSE_PSY_A_CORE
Impaired

## 2021-05-11 NOTE — BH PSYCHOLOGY - GROUP THERAPY NOTE - NSBHPSYCHOLGRPNAME_PSY_A_CORE
CBT (Cognitive Behavioral Therapy) Group

## 2021-05-11 NOTE — BH INPATIENT PSYCHIATRY PROGRESS NOTE - NSBHMSEMOVE_PSY_A_CORE
Other

## 2021-05-11 NOTE — BH INPATIENT PSYCHIATRY PROGRESS NOTE - NSTXPSYCHODATETRGT_PSY_ALL_CORE
05-May-2021
28-Apr-2021
05-May-2021
28-Apr-2021
28-Apr-2021
12-May-2021
05-May-2021
05-May-2021
12-May-2021
28-Apr-2021
05-May-2021
12-May-2021
28-Apr-2021
12-May-2021
05-May-2021
28-Apr-2021

## 2021-05-11 NOTE — BH INPATIENT PSYCHIATRY PROGRESS NOTE - NSTXVIOLNTGOALOTHER_PSY_ALL_CORE
will maintain good behavioral control

## 2021-05-11 NOTE — BH INPATIENT PSYCHIATRY PROGRESS NOTE - NSTXPSYCHODATEEST_PSY_ALL_CORE
14-Apr-2021
14-Apr-2021
28-Apr-2021
14-Apr-2021
28-Apr-2021
14-Apr-2021
28-Apr-2021
14-Apr-2021
28-Apr-2021
14-Apr-2021
28-Apr-2021
28-Apr-2021

## 2021-05-11 NOTE — BH INPATIENT PSYCHIATRY PROGRESS NOTE - NSCGISEVERILLNESS_PSY_ALL_CORE
6 = Severely ill - disruptive pathology, behavior and function are frequently influenced by symptoms, may require assistance from others
5 = Markedly ill - intrusive symptoms that distinctly impair social/occupational function or cause intrusive levels of distress
6 = Severely ill - disruptive pathology, behavior and function are frequently influenced by symptoms, may require assistance from others
5 = Markedly ill - intrusive symptoms that distinctly impair social/occupational function or cause intrusive levels of distress
6 = Severely ill - disruptive pathology, behavior and function are frequently influenced by symptoms, may require assistance from others
5 = Markedly ill - intrusive symptoms that distinctly impair social/occupational function or cause intrusive levels of distress
5 = Markedly ill - intrusive symptoms that distinctly impair social/occupational function or cause intrusive levels of distress
6 = Severely ill - disruptive pathology, behavior and function are frequently influenced by symptoms, may require assistance from others
5 = Markedly ill - intrusive symptoms that distinctly impair social/occupational function or cause intrusive levels of distress

## 2021-05-11 NOTE — BH PSYCHOLOGY - GROUP THERAPY NOTE - NSPSYCHOLGRPCOGPROB_PSY_A_CORE FT
Anxiety, Depression, Emotion dysregulation, Lack of coping skills

## 2021-05-11 NOTE — BH INPATIENT PSYCHIATRY PROGRESS NOTE - NSTXVIOLNTDATEEST_PSY_ALL_CORE
15-Apr-2021
14-Apr-2021
22-Apr-2021
15-Apr-2021
22-Apr-2021
22-Apr-2021
15-Apr-2021
15-Apr-2021
29-Apr-2021
28-Apr-2021
14-Apr-2021
15-Apr-2021
15-Apr-2021
29-Apr-2021
29-Apr-2021
22-Apr-2021
15-Apr-2021
22-Apr-2021

## 2021-05-11 NOTE — BH INPATIENT PSYCHIATRY PROGRESS NOTE - NSTXPROBVIOLNT_PSY_ALL_CORE
VIOLENT/AGGRESSIVE BEHAVIOR

## 2021-05-11 NOTE — BH INPATIENT PSYCHIATRY PROGRESS NOTE - NSBHMSEAFFRANGE_PSY_A_CORE
Constricted

## 2021-05-11 NOTE — BH INPATIENT PSYCHIATRY PROGRESS NOTE - NSBHMSEAFFCONG_PSY_A_CORE
Non-congruent
Congruent
Non-congruent
Congruent

## 2021-05-11 NOTE — BH INPATIENT PSYCHIATRY PROGRESS NOTE - NSBHCONSULTIPREASON_PSY_A_CORE
danger to others; mental illness expected to respond to inpatient care

## 2021-06-09 NOTE — BH INPATIENT PSYCHIATRY PROGRESS NOTE - LEVEL OF CONSCIOUSNESS
Alert
Depressed

## 2021-08-06 NOTE — BH INPATIENT PSYCHIATRY ASSESSMENT NOTE - RISK ASSESSMENT
[facility] Progress Note    HPI    No chief complaint on file. As per my initial H&P from 12/21/2020  \" Jia Brewster is a 48year old, who presents for evaluation of headache and vision issues.       Patient states she has hi surgical history.   Family History   Problem Relation Age of Onset   • Other (thyroid disorder) Sister      Social History    Socioeconomic History      Marital status:       Spouse name: Not on file      Number of children: Not on file      Years of tone normal  DTR: 2+ symmetric throughout, toes downgoing bilaterally, no clonus  Sensory: intact to light touch throughout  Coord: FNF intact with no tremor or dysmetria; rapid alternating movements intact bilaterally  Romberg: absent  Gait: normal casual Impression/ Plan:  Fabien Mcgill is a 48year old female with past medical history significant for hyperlipidemia, as well as migraines, who originally presented 12/2020, for evaluation of headaches, in the setting of recent hypertension.     Pa Assessment is limited due to patient sedation. Low suspicion for elevated acute suicide risk as patient with no documented history, reportedly denied SI in ED. Moderate violence risk in setting of aggressive and violent behavior in last week, though currently sedated, not threatening. Elevated baseline risk in setting of chronic psychotic disorder. Risk factors include male sex, suspected treatment nonadherence, history of substance use, unstable domicile, poor social supports. Unable to identify protective factors at this time. Immediate risk is minimized by inpatient admission to safe environment with appropriate supervision and limited access to lethal means. Future risk to be minimized by treatment of psychosis, maximizing outpatient supports, providing patient education, discussing emergency procedures, and ensuring close follow-up.

## 2021-08-10 NOTE — BH SAFETY PLAN - LOCAL URGENT CARE ADDRESS
[Prepares cereal] : prepares cereal [Brushes teeth, no help] : brushes teeth, no help [phone] [place] [address], [place] [address]  [Copies square and triangle] : copies square and triangle [Able to tie knot] : able to tie knot [Mature pencil grasp] : mature pencil grasp [Good articulation and language skills] : good articulation and language skills [Listens and attends] : listens and attends [Counts to 10] : counts to 10 [Names 4+ colors] : names 4+ colors [Balances on one foot 6 seconds] : balances on one foot 6 seconds [Hops and skips] : hops and skips [Plays board/card games] : plays board/card games [Prints some letters and numbers] : prints some letters and numbers [Draws person with 6+ parts] : draws person with 6+ parts [Defines 7 words] : defines 7 words

## 2021-09-08 NOTE — ED ADULT NURSE NOTE - CAS TRG GEN SKIN CONDITION
Warm Render Risk Assessment In Note?: no Additional Notes: Patient states BP was biopsied by Dr. Ena Tariq in [place]. \\nPatient reports he was Diagnosed 2-3 years ago. \\nPatient reports he is on Triamcinolone cream along with Cellcept. \\nPatient reports he had lab work done a few months ago, will request records. \\nPatient was prescribed Hydroxyzine 25mg patient states he would prefer not to take medication. \\nHydroxyzine 10mg discussed instead of 25mg however patient declines. \\nPatient states he was off of Cellcept for a couple of weeks & just restarted 4 days ago. Detail Level: Simple Additional Notes: Recommend that he have a CBC with diff every 3 mo while on cellcept. I would also recommend if he would need me to refill in the future Cellcept that he have CMP, CBC, Hep B sAg, Hep C Ab, HIV testing. Requesting records to verify that labs were tested.

## 2021-11-29 NOTE — BH SOCIAL WORK CONFIRMATION FOLLOW UP NOTE - NSLINKCONFIRM_PSY_ALL_CORE
Court Order Discharge Mirvaso Counseling: Mirvaso is a topical medication which can decrease superficial blood flow where applied. Side effects are uncommon and include stinging, redness and allergic reactions.

## 2021-12-10 ENCOUNTER — EMERGENCY (EMERGENCY)
Facility: HOSPITAL | Age: 46
LOS: 1 days | Discharge: HOME | End: 2021-12-10
Attending: STUDENT IN AN ORGANIZED HEALTH CARE EDUCATION/TRAINING PROGRAM
Payer: MEDICARE

## 2021-12-10 VITALS
RESPIRATION RATE: 16 BRPM | SYSTOLIC BLOOD PRESSURE: 148 MMHG | HEART RATE: 84 BPM | HEIGHT: 68 IN | WEIGHT: 154.98 LBS | OXYGEN SATURATION: 98 % | TEMPERATURE: 98 F | DIASTOLIC BLOOD PRESSURE: 90 MMHG

## 2021-12-10 DIAGNOSIS — F23 BRIEF PSYCHOTIC DISORDER: ICD-10-CM

## 2021-12-10 LAB
ALBUMIN SERPL ELPH-MCNC: 4 G/DL — SIGNIFICANT CHANGE UP (ref 3.3–5.2)
ALP SERPL-CCNC: 89 U/L — SIGNIFICANT CHANGE UP (ref 40–120)
ALT FLD-CCNC: 16 U/L — SIGNIFICANT CHANGE UP
ANION GAP SERPL CALC-SCNC: 8 MMOL/L — SIGNIFICANT CHANGE UP (ref 5–17)
APAP SERPL-MCNC: <3 UG/ML — LOW (ref 10–26)
AST SERPL-CCNC: 22 U/L — SIGNIFICANT CHANGE UP
BASOPHILS # BLD AUTO: 0.05 K/UL — SIGNIFICANT CHANGE UP (ref 0–0.2)
BASOPHILS NFR BLD AUTO: 0.7 % — SIGNIFICANT CHANGE UP (ref 0–2)
BILIRUB SERPL-MCNC: 0.4 MG/DL — SIGNIFICANT CHANGE UP (ref 0.4–2)
BUN SERPL-MCNC: 26.5 MG/DL — HIGH (ref 8–20)
CALCIUM SERPL-MCNC: 9.5 MG/DL — SIGNIFICANT CHANGE UP (ref 8.6–10.2)
CHLORIDE SERPL-SCNC: 102 MMOL/L — SIGNIFICANT CHANGE UP (ref 98–107)
CO2 SERPL-SCNC: 28 MMOL/L — SIGNIFICANT CHANGE UP (ref 22–29)
CREAT SERPL-MCNC: 1.62 MG/DL — HIGH (ref 0.5–1.3)
EOSINOPHIL # BLD AUTO: 0.13 K/UL — SIGNIFICANT CHANGE UP (ref 0–0.5)
EOSINOPHIL NFR BLD AUTO: 1.8 % — SIGNIFICANT CHANGE UP (ref 0–6)
ETHANOL SERPL-MCNC: <10 MG/DL — SIGNIFICANT CHANGE UP (ref 0–9)
GLUCOSE SERPL-MCNC: 82 MG/DL — SIGNIFICANT CHANGE UP (ref 70–99)
HCT VFR BLD CALC: 45 % — SIGNIFICANT CHANGE UP (ref 39–50)
HGB BLD-MCNC: 15 G/DL — SIGNIFICANT CHANGE UP (ref 13–17)
IMM GRANULOCYTES NFR BLD AUTO: 0.1 % — SIGNIFICANT CHANGE UP (ref 0–1.5)
LITHIUM SERPL-MCNC: 0.48 MMOL/L — LOW (ref 0.5–1.5)
LYMPHOCYTES # BLD AUTO: 2.46 K/UL — SIGNIFICANT CHANGE UP (ref 1–3.3)
LYMPHOCYTES # BLD AUTO: 34.2 % — SIGNIFICANT CHANGE UP (ref 13–44)
MCHC RBC-ENTMCNC: 30.5 PG — SIGNIFICANT CHANGE UP (ref 27–34)
MCHC RBC-ENTMCNC: 33.3 GM/DL — SIGNIFICANT CHANGE UP (ref 32–36)
MCV RBC AUTO: 91.5 FL — SIGNIFICANT CHANGE UP (ref 80–100)
MONOCYTES # BLD AUTO: 0.63 K/UL — SIGNIFICANT CHANGE UP (ref 0–0.9)
MONOCYTES NFR BLD AUTO: 8.8 % — SIGNIFICANT CHANGE UP (ref 2–14)
NEUTROPHILS # BLD AUTO: 3.92 K/UL — SIGNIFICANT CHANGE UP (ref 1.8–7.4)
NEUTROPHILS NFR BLD AUTO: 54.4 % — SIGNIFICANT CHANGE UP (ref 43–77)
PLATELET # BLD AUTO: 282 K/UL — SIGNIFICANT CHANGE UP (ref 150–400)
POTASSIUM SERPL-MCNC: 4.4 MMOL/L — SIGNIFICANT CHANGE UP (ref 3.5–5.3)
POTASSIUM SERPL-SCNC: 4.4 MMOL/L — SIGNIFICANT CHANGE UP (ref 3.5–5.3)
PROT SERPL-MCNC: 6.5 G/DL — LOW (ref 6.6–8.7)
RBC # BLD: 4.92 M/UL — SIGNIFICANT CHANGE UP (ref 4.2–5.8)
RBC # FLD: 13.2 % — SIGNIFICANT CHANGE UP (ref 10.3–14.5)
SALICYLATES SERPL-MCNC: <0.6 MG/DL — LOW (ref 10–20)
SODIUM SERPL-SCNC: 138 MMOL/L — SIGNIFICANT CHANGE UP (ref 135–145)
WBC # BLD: 7.2 K/UL — SIGNIFICANT CHANGE UP (ref 3.8–10.5)
WBC # FLD AUTO: 7.2 K/UL — SIGNIFICANT CHANGE UP (ref 3.8–10.5)

## 2021-12-10 PROCEDURE — 99220: CPT

## 2021-12-10 PROCEDURE — 90792 PSYCH DIAG EVAL W/MED SRVCS: CPT

## 2021-12-10 PROCEDURE — 93010 ELECTROCARDIOGRAM REPORT: CPT

## 2021-12-10 RX ORDER — LITHIUM CARBONATE 300 MG/1
450 TABLET, EXTENDED RELEASE ORAL DAILY
Refills: 0 | Status: DISCONTINUED | OUTPATIENT
Start: 2021-12-10 | End: 2021-12-15

## 2021-12-10 RX ORDER — LIDOCAINE 4 G/100G
1 CREAM TOPICAL ONCE
Refills: 0 | Status: DISCONTINUED | OUTPATIENT
Start: 2021-12-10 | End: 2021-12-15

## 2021-12-10 RX ORDER — DIPHENHYDRAMINE HCL 50 MG
50 CAPSULE ORAL EVERY 6 HOURS
Refills: 0 | Status: DISCONTINUED | OUTPATIENT
Start: 2021-12-10 | End: 2021-12-15

## 2021-12-10 RX ORDER — HALOPERIDOL DECANOATE 100 MG/ML
5 INJECTION INTRAMUSCULAR EVERY 6 HOURS
Refills: 0 | Status: DISCONTINUED | OUTPATIENT
Start: 2021-12-10 | End: 2021-12-15

## 2021-12-10 RX ORDER — LITHIUM CARBONATE 300 MG/1
900 TABLET, EXTENDED RELEASE ORAL AT BEDTIME
Refills: 0 | Status: DISCONTINUED | OUTPATIENT
Start: 2021-12-10 | End: 2021-12-15

## 2021-12-10 RX ADMIN — HALOPERIDOL DECANOATE 5 MILLIGRAM(S): 100 INJECTION INTRAMUSCULAR at 15:30

## 2021-12-10 RX ADMIN — Medication 2 MILLIGRAM(S): at 15:30

## 2021-12-10 RX ADMIN — Medication 50 MILLIGRAM(S): at 15:30

## 2021-12-10 NOTE — ED ADULT NURSE NOTE - HPI (INCLUDE ILLNESS QUALITY, SEVERITY, DURATION, TIMING, CONTEXT, MODIFYING FACTORS, ASSOCIATED SIGNS AND SYMPTOMS)
Patient comes to  after being medically cleared in main ED. Arrived agitated and rambling when assessment attempted. Pt had been threatening and aggressive toward staff in main ED. Pt agreeable to rec'v IM medication to help him relax and reduce agitation. Administered IM Haldol 5mg, Ativan 2mg, and Benadryl 50mg to R deltoid at 1530. Pt remained irritable, but cooperative. Unable to express self rationally, tangential and not making sense during attempts to interview. Refused blood draw for lab work, and has not provided urine specimen. Ate cookies and meal trays before eventually going to bed. Pt spoke primarily about "good" and "evil", but unclear what pt was trying to convey. Stated he felt like he was "probably going to have to go to another hospital", but didn't want to be angry about it if he does. Has known psychiatric hospitalizations previously this year at [facility], and hx of polysubstance abuse.
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
## 2021-12-10 NOTE — ED PROVIDER NOTE - CLINICAL SUMMARY MEDICAL DECISION MAKING FREE TEXT BOX
47yo male with schizophrenia presenting with bizarre behavior- unclear if substance-induced vs medication noncompliance. Will obtain labs- have  evaluate patient. Felipa Hood DO

## 2021-12-10 NOTE — ED BEHAVIORAL HEALTH ASSESSMENT NOTE - VIOLENCE RISK FACTORS:
Feeling of being under threat and being unable to control threat/Violent ideation/threat/speech/Substance abuse/Affective dysregulation/Impulsivity/Lack of insight into violence risk/need for treatment/Noncompliance with treatment/Irritability
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
## 2021-12-10 NOTE — ED BEHAVIORAL HEALTH ASSESSMENT NOTE - CURRENT MEDICATION
unknown current meds.  Awaiting return call from ACT team for med list Prolixin Dec 37.5 IM q 3 weeks, next dose due on 12/15/21. per ACT team  Aristada 1064 IM q 56 days next dose due 1/19/22 per ACT team  Lithium  am and 900 hs

## 2021-12-10 NOTE — ED CDU PROVIDER INITIAL DAY NOTE - MEDICAL DECISION MAKING DETAILS
45yo male with PMH schizophrenia presenting with bizarre behavior, found to have mildly elevated Cr, will give PO fluids and rpt in AM. If no improvement then PMD f/u. Felipa Hood DO

## 2021-12-10 NOTE — ED PROVIDER NOTE - PHYSICAL EXAMINATION
Gen: agitated appearing  Head: NCAT  Lung: CTAB, no respiratory distress, no wheezing, rales, rhonchi  CV: normal s1/s2, rrr, no murmurs, Normal perfusion  Abd: soft, NTND  MSK: No edema, no visible deformities, full range of motion in all 4 extremities  Neuro: No focal neurologic deficits  Skin: No rash   Psych: agitated, tangential, disorganized
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
## 2021-12-10 NOTE — ED BEHAVIORAL HEALTH ASSESSMENT NOTE - DESCRIPTION
COVID infection in 4/2020 unable to assess ICU Vital Signs Last 24 Hrs  T(C): 37.1 (10 Dec 2021 19:07), Max: 37.1 (10 Dec 2021 19:07)  T(F): 98.7 (10 Dec 2021 19:07), Max: 98.7 (10 Dec 2021 19:07)  HR: 80 (10 Dec 2021 19:07) (80 - 84)  BP: 107/71 (10 Dec 2021 19:07) (107/71 - 148/90)  BP(mean): --  ABP: --  ABP(mean): --  RR: 18 (10 Dec 2021 19:07) (16 - 18)  SpO2: 97% (10 Dec 2021 19:07) (97% - 98%)

## 2021-12-10 NOTE — ED PROVIDER NOTE - PROGRESS NOTE DETAILS
Seen by - recommending acute inpatient admission. Patient noted to have elevated creatinine, will give patient water, repeat BMP. Will place patient in obs. Felipa Hood DO

## 2021-12-10 NOTE — ED BEHAVIORAL HEALTH ASSESSMENT NOTE - OTHER
unable to assess unable to assess, pt in bed pending med clearance and bed availability med compliance, lives in shelter shelter
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
## 2021-12-11 VITALS
TEMPERATURE: 98 F | RESPIRATION RATE: 18 BRPM | HEART RATE: 98 BPM | DIASTOLIC BLOOD PRESSURE: 82 MMHG | OXYGEN SATURATION: 98 % | SYSTOLIC BLOOD PRESSURE: 123 MMHG

## 2021-12-11 LAB
AMPHET UR-MCNC: NEGATIVE — SIGNIFICANT CHANGE UP
ANION GAP SERPL CALC-SCNC: 10 MMOL/L — SIGNIFICANT CHANGE UP (ref 5–17)
APPEARANCE UR: CLEAR — SIGNIFICANT CHANGE UP
BARBITURATES UR SCN-MCNC: NEGATIVE — SIGNIFICANT CHANGE UP
BENZODIAZ UR-MCNC: NEGATIVE — SIGNIFICANT CHANGE UP
BILIRUB UR-MCNC: NEGATIVE — SIGNIFICANT CHANGE UP
BUN SERPL-MCNC: 22 MG/DL — HIGH (ref 8–20)
CALCIUM SERPL-MCNC: 9.3 MG/DL — SIGNIFICANT CHANGE UP (ref 8.6–10.2)
CHLORIDE SERPL-SCNC: 105 MMOL/L — SIGNIFICANT CHANGE UP (ref 98–107)
CO2 SERPL-SCNC: 24 MMOL/L — SIGNIFICANT CHANGE UP (ref 22–29)
COCAINE METAB.OTHER UR-MCNC: POSITIVE
COLOR SPEC: YELLOW — SIGNIFICANT CHANGE UP
CREAT SERPL-MCNC: 1.46 MG/DL — HIGH (ref 0.5–1.3)
DIFF PNL FLD: NEGATIVE — SIGNIFICANT CHANGE UP
GLUCOSE SERPL-MCNC: 95 MG/DL — SIGNIFICANT CHANGE UP (ref 70–99)
GLUCOSE UR QL: NEGATIVE MG/DL — SIGNIFICANT CHANGE UP
KETONES UR-MCNC: NEGATIVE — SIGNIFICANT CHANGE UP
LEUKOCYTE ESTERASE UR-ACNC: NEGATIVE — SIGNIFICANT CHANGE UP
LITHIUM SERPL-MCNC: 0.35 MMOL/L — LOW (ref 0.5–1.5)
METHADONE UR-MCNC: NEGATIVE — SIGNIFICANT CHANGE UP
NITRITE UR-MCNC: NEGATIVE — SIGNIFICANT CHANGE UP
OPIATES UR-MCNC: NEGATIVE — SIGNIFICANT CHANGE UP
PCP SPEC-MCNC: SIGNIFICANT CHANGE UP
PCP UR-MCNC: NEGATIVE — SIGNIFICANT CHANGE UP
PH UR: 6.5 — SIGNIFICANT CHANGE UP (ref 5–8)
POTASSIUM SERPL-MCNC: 4.7 MMOL/L — SIGNIFICANT CHANGE UP (ref 3.5–5.3)
POTASSIUM SERPL-SCNC: 4.7 MMOL/L — SIGNIFICANT CHANGE UP (ref 3.5–5.3)
PROT UR-MCNC: NEGATIVE — SIGNIFICANT CHANGE UP
SARS-COV-2 RNA SPEC QL NAA+PROBE: SIGNIFICANT CHANGE UP
SODIUM SERPL-SCNC: 139 MMOL/L — SIGNIFICANT CHANGE UP (ref 135–145)
SP GR SPEC: 1.01 — SIGNIFICANT CHANGE UP (ref 1.01–1.02)
THC UR QL: POSITIVE
UROBILINOGEN FLD QL: NEGATIVE MG/DL — SIGNIFICANT CHANGE UP

## 2021-12-11 PROCEDURE — U0003: CPT

## 2021-12-11 PROCEDURE — 93005 ELECTROCARDIOGRAM TRACING: CPT

## 2021-12-11 PROCEDURE — 80053 COMPREHEN METABOLIC PANEL: CPT

## 2021-12-11 PROCEDURE — 80178 ASSAY OF LITHIUM: CPT

## 2021-12-11 PROCEDURE — 99211 OFF/OP EST MAY X REQ PHY/QHP: CPT

## 2021-12-11 PROCEDURE — 80307 DRUG TEST PRSMV CHEM ANLYZR: CPT

## 2021-12-11 PROCEDURE — 96372 THER/PROPH/DIAG INJ SC/IM: CPT

## 2021-12-11 PROCEDURE — 81003 URINALYSIS AUTO W/O SCOPE: CPT

## 2021-12-11 PROCEDURE — 80048 BASIC METABOLIC PNL TOTAL CA: CPT

## 2021-12-11 PROCEDURE — G0378: CPT

## 2021-12-11 PROCEDURE — 99285 EMERGENCY DEPT VISIT HI MDM: CPT | Mod: 25

## 2021-12-11 PROCEDURE — U0005: CPT

## 2021-12-11 PROCEDURE — 85025 COMPLETE CBC W/AUTO DIFF WBC: CPT

## 2021-12-11 PROCEDURE — 99217: CPT

## 2021-12-11 PROCEDURE — 36415 COLL VENOUS BLD VENIPUNCTURE: CPT

## 2021-12-11 RX ORDER — NICOTINE POLACRILEX 2 MG
4 GUM BUCCAL
Refills: 0 | Status: DISCONTINUED | OUTPATIENT
Start: 2021-12-11 | End: 2021-12-15

## 2021-12-11 RX ORDER — ACETAMINOPHEN 500 MG
975 TABLET ORAL ONCE
Refills: 0 | Status: COMPLETED | OUTPATIENT
Start: 2021-12-11 | End: 2021-12-11

## 2021-12-11 RX ADMIN — Medication 975 MILLIGRAM(S): at 13:48

## 2021-12-11 RX ADMIN — LITHIUM CARBONATE 450 MILLIGRAM(S): 300 TABLET, EXTENDED RELEASE ORAL at 08:20

## 2021-12-11 RX ADMIN — Medication 975 MILLIGRAM(S): at 13:05

## 2021-12-11 NOTE — ED CDU PROVIDER DISPOSITION NOTE - CARE PLAN
"[facility] to Housing and Shelter the automated system stated that for a shelter they should call [facility]. [facility]. Aun was able to find the "[employer] cab voucher to be able to send the patient to [facility].     Next Outreach: 11/28/18   1

## 2021-12-11 NOTE — ED CDU PROVIDER DISPOSITION NOTE - PATIENT PORTAL LINK FT
You can access the FollowMyHealth Patient Portal offered by [facility] by registering at the following website: [email or web address]. By joining [facility]’s FollowMyHealth portal, you will also be able to view your health information using other applications (apps) compatible with our system.

## 2021-12-11 NOTE — ED ADULT NURSE REASSESSMENT NOTE - NS ED NURSE REASSESS COMMENT FT1
Assumed care of the at 1930. Pt sleeping comfortably in bed no agitation at this time. no complaints for RN will continue to monitor.
Patient denies suicidal or homicidal ideations.  Patient verbally agrees to return to ED post discharge.  Safety maintained.
Pt refusing vital signs at this time no complaints for RN will continue to monitor.
Pt sleeping comfortably throughout the night no complaints for RN will continue to monitor.
Reviewed discharge instruction, verbalized understanding and provided a copy.
Patient mood is labile.  Patient hyperverbal and agitated during interactions and then will quickly calm down an rest in bed.  Patient angry he is pending a transfer to inpatient unit.  No attempts to harm self or others PRN medications offered but declined.  Safety of patient maintained.
Assumed care of patient at 0705.  Patient resting in bed awoken for vital sign assessment.  Patient hyperverbal reporting he come here just to talk to someone.  Patient oriented to staff and educated about plan of care which he verbalized understanding of.  Safety of patient maintained.

## 2021-12-11 NOTE — ED CDU PROVIDER DISPOSITION NOTE - CLINICAL COURSE
47yo male with PMH schizophrenia, cocaine abuse presenting with bizarre behavior. Per EMS- patient not at baseline, cannot collect thoughts. Resides at group home,  called EMS because she believes he is not taking his medications. Pt was medically cleared and evaluated by psychiatry at first potential admission but today after further observation and re-evaluation pt cleared by psych, pt to follow up as outpt has ACT. Stable for dc

## 2021-12-11 NOTE — ED CDU PROVIDER DISPOSITION NOTE - NSFOLLOWUPINSTRUCTIONS_ED_ALL_ED_FT
Schizophrenia    Schizophrenia is a mental illness. It may cause disturbed or disorganized thinking, speech, or behavior. People with schizophrenia have problems functioning in one or more areas of life. People with schizophrenia are at increased risk for suicide, certain long-term (chronic) physical illnesses, and unhealthy behaviors, such as smoking and drug use.    People who have family members with schizophrenia are at higher risk of developing the illness. Schizophrenia affects men and women equally, but it usually appears at an earlier age (teenage or early adult years) in men.    What are the causes?  The cause of this condition is not known. Episodes are often triggered by major life changes or events, such as:    Family stress.  A stressful life event, such as going to college, serving in the , becoming pregnant, or having a child.  Divorce.  Loss of a loved one.    What increases the risk?  The following factors may make you more likely to develop this condition:    Having a family member who has schizophrenia. Some gene combinations may increase the risk, but there is no single gene that causes schizophrenia.  Problems during your mother's pregnancy, which may have affected your brain development.  Use of drugs (including cannabis, cocaine, and amphetamines) that change how the mind works.  Problems in brain chemistry or neurotransmitter. Neurotransmitters are chemicals in the brain.    What are the signs or symptoms?  The earliest symptoms are often subtle and may go unnoticed until the illness becomes more severe and the person has a severe loss of contact with reality (acute episode of psychosis). Symptoms of schizophrenia may be ongoing or may come and go in severity.    Symptoms may include:    Seeing, hearing, tasting, smelling, or feeling things that are not real (hallucinating).  Having false beliefs (delusions). You may believe that you are being attacked, persecuted, cheated, or conspired against.  Speaking in a way that does not make sense to others or is hard to understand.  Behaving in an odd, confused, unfocused, withdrawn, or disorganized way.  Having extremely overactive or underactive motor activity (catatonia). Motor activity is any action that involves the muscles.  Having difficulty expressing emotions.  Losing willpower.    Symptoms usually affect the level of functioning in major areas of life, such as work, school, relationships, or self-care.    How is this diagnosed?  Schizophrenia is diagnosed through an assessment by a mental health care provider.    Your mental health care provider may ask questions about:    Your thoughts, behavior, and mood.  Your ability to function in daily life.  Your medical history.  Any use of alcohol or drugs, including prescription medicines.  You may have blood tests and imaging exams.    How is this treated?     Schizophrenia is a chronic illness that is best controlled with continuous treatment rather than treatment only when symptoms occur. The following treatments are used to manage schizophrenia:    Medicine. This is the most effective and important form of treatment for schizophrenia. Antipsychotic medicines are usually prescribed to help manage schizophrenia. Other types of medicine may be added to relieve any symptoms that may occur despite the use of antipsychotic medicines.  Counseling or talk therapy. Individual, group, or family counseling may be helpful in providing education, support, and guidance. Many people also benefit from social skills and job skills (vocational) training.    A combination of medicine and counseling is best for managing the disorder over time. A procedure in which electricity is applied to the brain through the scalp (electroconvulsive therapy) may be used to treat catatonic schizophrenia or schizophrenia in people who cannot take medicine or do not respond to medicine and counseling.    Follow these instructions at home:      Lifestyle    Keep stress under control. Stress may trigger psychosis and make symptoms worse.  Try to get as much sleep as you can.  Avoid alcohol and drugs. They can affect how medicine works and make symptoms worse.  Avoid using products that contain nicotine. If you would like to quit, talk with your health care provider about programs that might help you quit.        General instructions     Surround yourself with people who care about you and can help you manage your condition.  Take over-the-counter medicines, prescription medicines, and herbal remedies only as told by your health care provider.  Keep all follow-up visits as told by your health care provider and counselor. This is important.    Contact a health care provider if:  You have a bad response to changes in medicines or to your treatment plan.  You have trouble falling sleep.  You have a low mood that will not go away.  You are using drugs or alcohol, tobacco products, or too much caffeine.    Get help right away if:  You feel out of control.  You or others notice warning signs of suicide, such as:    Using drugs or alcohol more often.  Expressing feelings of not having a purpose in life or feeling trapped, guilty, anxious, agitated, or hopeless.  Withdrawing from friends and family.  Showing uncontrolled anger, recklessness, and dramatic mood changes.  Talking about suicide or searching for methods.    If you ever feel like you may hurt yourself or others, or have thoughts about taking your own life, get help right away. Go to your nearest emergency department or:     Call your local emergency services (241 in the [place]   Call a suicide crisis helpline, such as the National Suicide Prevention Lifeline at 1-159.203.1031. This is open 24 hours a day in the [place]   Text the Crisis Text Line at 200444 (in the [place]    Summary  Schizophrenia is a mental illness that causes disturbed or disorganized thinking, speech, or behavior.  Symptoms of schizophrenia may be ongoing or may come and go. They are often triggered by major life events.  Keep stress under control. Stress may trigger psychosis and make symptoms worse.  Avoid alcohol and drugs. They can affect how medicine works and make symptoms worse.  Get help right away if you feel out of control or feel like you may hurt yourself.    ADDITIONAL NOTES AND INSTRUCTIONS    -Please follow-up with your primary care doctor in the next 2 days.  Please call tomorrow for an appointment.  If you cannot follow-up with your primary care doctor please return to the ED for any urgent issues.  - You were given a copy of the tests performed today.  Please bring the results with you and review them with your primary care doctor.  - If you have any worsening of symptoms or any other concerns please return to the ED immediately.  - Please continue taking your home medications as directed.

## 2021-12-11 NOTE — CHART NOTE - NSCHARTNOTEFT_GEN_A_CORE
As per MD, patient is medically stable for discharge. [facility] There are no beds currently available at [facility], [facility], or [place]. JULIO placed call to [place] ([phone]) and spoke with employee, Millie. Millie reported there is a male bed available and they will run the patient's Medicare number to see if patient has Medicare days for inpatient stay. Millie to follow up with JULIO regarding patient's referral.

## 2021-12-11 NOTE — ED BEHAVIORAL HEALTH NOTE - BEHAVIORAL HEALTH NOTE
PROGRESS NOTE: 21 @ 12:32  	  • Reason for Ongoing Consultation: 	    ID: [id number] Male with HEALTH ISSUES - PROBLEM Dx:  Schizophrenia, acute            INTERVAL DATA:   • Interval Chief Complaint: "I don't want to go to the hospital.  I'm not going to hurt anyone. ..I just wanted to talk to someone".  • Interval History:   Seen on follow up.  Calm and cooperative, showered, ate a meal.  Pt asking why he is being admitted to in pt.  Pt reminded of homicidal threat yesterday and agitation.  Pt states he is not an aggressive person and not going to hurt anyone.  He claims he has no one to talk to.  Remains disorganized and tangential but much improved and no longer guarded,  paranoid or agitated.  Denies SI/HI.  Denies AH and no h/o same.  Pt tox positive for cocaine and THC and is appears agitation what secondary to acute substance induced psychosis which has resolved.  Spoke with ACT team who reports Pt at baseline and is not aggressive  at baseline and has never been suicidal.  Spoke to mother who is has no safety  concern but asked he get home by taxi rather than on his own due to getting distracted and possibly not back at shelter.  Pt has a follow up appt with ACT team on Monday per Raad.  Pt can be discharged to out Pt provider.  REVIEW OF SYSTEMS:   • Constitutional Symptoms	No complaints  • Eyes	No complaints  • Ears / Nose / Throat / Mouth	No complaints  • Cardiovascular	No complaints  • Respiratory	No complaints  • Gastrointestinal	No complaints  • Genitourinary	No complaints  • Musculoskeletal	No complaints  • Skin	No complaints  • Neurological	No complaints  • Psychiatric (see HPI)	See HPI  • Endocrine	No complaints  • Hematologic / Lymphatic	No complaints  • Allergic / Immunologic	No complaints    REVIEW OF VITALS/LABS/IMAGING/INVESTIGATIONS:   • Vital signs reviewed: Yes  • Vital Signs:	    T(C): 36.7 (21 @ 07:34), Max: 37.1 (12-10-21 @ 19:07)  HR: 82 (21 @ 07:34) (80 - 84)  BP: 127/77 (21 @ 07:34) (107/71 - 148/90)  RR: 18 (21 @ 07:34) (16 - 18)  SpO2: 96% (21 @ 07:34) (96% - 98%)    • Available labs reviewed: Yes  • Available Lab Results:                           15.0   7.20  )-----------( 282      ( 10 Dec 2021 21:33 )             45.0     12-11    139  |  105  |  22.0<H>  ----------------------------<  95  4.7   |  24.0  |  1.46<H>    Ca    9.3      11 Dec 2021 06:07    TPro  6.5<L>  /  Alb  4.0  /  TBili  0.4  /  DBili  x   /  AST  22  /  ALT  16  /  AlkPhos  89  12-10    LIVER FUNCTIONS - ( 10 Dec 2021 21:33 )  Alb: 4.0 g/dL / Pro: 6.5 g/dL / ALK PHOS: 89 U/L / ALT: 16 U/L / AST: 22 U/L / GGT: x             Urinalysis Basic - ( 11 Dec 2021 08:08 )    Color: Yellow / Appearance: Clear / S.015 / pH: x  Gluc: x / Ketone: Negative  / Bili: Negative / Urobili: Negative mg/dL   Blood: x / Protein: Negative / Nitrite: Negative   Leuk Esterase: Negative / RBC: x / WBC x   Sq Epi: x / Non Sq Epi: x / Bacteria: x          MEDICATIONS:      PRN Medications:agitation kit last night on arrived to   • PRN Medications since last evaluation	  • PRN Details	    Current Medications:   diphenhydrAMINE Injectable 50 milliGRAM(s) IntraMuscular every 6 hours PRN  haloperidol    Injectable 5 milliGRAM(s) IntraMuscular every 6 hours PRN  lidocaine   4% Patch 1 Patch Transdermal Once  lithium CR (ESKALITH-CR) 450 milliGRAM(s) Oral daily  lithium CR (ESKALITH-CR) 900 milliGRAM(s) Oral at bedtime  LORazepam     Tablet 2 milliGRAM(s) Oral every 6 hours PRN  LORazepam   Injectable 2 milliGRAM(s) IntraMuscular every 6 hours PRN  nicotine  Polacrilex Lozenge 4 milliGRAM(s) Oral every 2 hours PRN     Medication Side Effects:  • Medication Side Effects or Adverse Reactions (new or ongoing)	None known    MENTAL STATUS EXAM:   • Level of Consciousness	Alert  • General Appearance	Well developed  • Body Habitus	Well nourished  • Hygiene	Good  • Grooming	fair  • Behavior	Cooperative  • Eye Contact	Good  • Relatedness	fair  • Impulse Control	Normal  • Muscle Tone / Strength	Normal muscle tone/strength  • Abnormal Movements	No abnormal movements  • Gait / Station	Normal gait / station  • Speech Volume	Normal  • Speech Rate	Normal  • Speech Spontaneity	Normal  • Speech Articulation	Normal  • Mood	Normal  • Affect Quality	Euthymic  • Affect Range	constricted  • Affect Congruence	Congruent  • Thought Process	less tangential but linear  • Thought Associations	Normal  • Thought Content	Unremarkable  • Perceptions	No abnormalities  • Oriented to Time	Yes  • Oriented to Place	Yes  • Oriented to Situation	Yes  • Oriented to Person	Yes  • Attention / Concentration	Normal  • Estimated Intelligence	Average  • Recent Memory	Normal  • Remote Memory	Normal  • Fund of Knowledge	Normal  • Language	No abnormalities noted  • Judgment (regarding everyday events)	Fair  • Insight (regarding psychiatric illness)	Fair    SUICIDALITY:   • Suicidality (Interval)	none known    HOMICIDALITY/AGGRESSION:   • Homicidality/Aggression	none known    DIAGNOSIS DSM-V:    Psychiatric Diagnosis (Corresponds to DSM-IV Axis I, II):   HEALTH ISSUES - PROBLEM Dx:  Schizophrenia, acute             Medical Diagnosis (Corresponds to DSM-IV Axis III):  • Axis III	      ASSESSMENT OF CURRENT CONDITION:   Summary (include case differential, formulation and patient response to therapy):    Calm and cooperative, showered, ate a meal.  Pt asking why he is being admitted to in pt.  Pt reminded of homicidal threat yesterday and agitation.  Pt states he is not an aggressive person and not going to hurt anyone.  He claims he has no one to talk to.  Remains disorganized and tangential but much improved and no longer guarded,  paranoid or agitated.  Denies SI/HI.  Denies AH and no h/o same.  Pt tox positive for cocaine and THC and is appears agitation what secondary to acute substance induced psychosis which has resolved.  Spoke with ACT team who reports Pt at baseline and is not aggressive  at baseline and has never been suicidal.  Spoke to mother who is has no safety  concern but asked he get home by taxi rather than on his own due to getting distracted and possibly not back at shelter.  Pt has a follow up appt with ACT team on Monday len Shankar.  Pt can be discharged to out Pt provider.  Risk Assessment (consider static vs modifiable risk factors and protective factors; comment on level of risk for dangerous behavior):   RF drug use, h/o aggression  PF no aggression or violence when at baseline  PLAN    T&R

## 2022-02-11 NOTE — [facility] INPATIENT PSYCHIATRY PROGRESS NOTE - CURRENT MEDICATION
12 - spoke w/Dr. Ifrah Parra r/t admission orders, verifying the orders needed and d/c the orders that were not via phone.   Updated floor
MEDICATIONS  (STANDING):  ARIPiprazole 15 milliGRAM(s) Oral at bedtime  ARIPiprazole lauroxil Injectable, Long Acting (ARISTADA) 1064 milliGRAM(s) IntraMuscular once  benztropine 2 milliGRAM(s) Oral at bedtime  fluPHENAZine 30 milliGRAM(s) Oral at bedtime  fluPHENAZine decanoate Injectable, Long Acting 37.5 milliGRAM(s) IntraMuscular once  lithium SR (LITHOBID) 600 milliGRAM(s) Oral at bedtime  melatonin. 5 milliGRAM(s) Oral at bedtime  melatonin. 5 milliGRAM(s) Oral at bedtime  nicotine - 21 mG/24Hr(s) Patch 1 patch Transdermal daily    MEDICATIONS  (PRN):  chlorproMAZINE    Injectable 100 milliGRAM(s) IntraMuscular once PRN severe agitation or aggression  chlorproMAZINE    Tablet 100 milliGRAM(s) Oral every 4 hours PRN severe agitation or aggression  diphenhydrAMINE 50 milliGRAM(s) Oral every 6 hours PRN EPS ppx  LORazepam     Tablet 2 milliGRAM(s) Oral every 6 hours PRN agitation  LORazepam   Injectable 3 milliGRAM(s) IntraMuscular once PRN agitation  
MEDICATIONS  (STANDING):  ARIPiprazole 2 milliGRAM(s) Oral at bedtime  benztropine 2 milliGRAM(s) Oral at bedtime  fluPHENAZine 10 milliGRAM(s) Oral at bedtime  lithium SR (LITHOBID) 300 milliGRAM(s) Oral at bedtime  melatonin. 5 milliGRAM(s) Oral at bedtime    MEDICATIONS  (PRN):  chlorproMAZINE    Injectable 100 milliGRAM(s) IntraMuscular once PRN severe agitation or aggression  chlorproMAZINE    Tablet 100 milliGRAM(s) Oral every 4 hours PRN severe agitation or aggression  diphenhydrAMINE 50 milliGRAM(s) Oral every 6 hours PRN EPS ppx  LORazepam     Tablet 2 milliGRAM(s) Oral every 6 hours PRN agitation  LORazepam   Injectable 3 milliGRAM(s) IntraMuscular once PRN agitation  
MEDICATIONS  (STANDING):  ARIPiprazole 2 milliGRAM(s) Oral at bedtime  benztropine 2 milliGRAM(s) Oral at bedtime  fluPHENAZine 20 milliGRAM(s) Oral at bedtime  lithium SR (LITHOBID) 1200 milliGRAM(s) Oral at bedtime  melatonin. 5 milliGRAM(s) Oral at bedtime  nicotine - 21 mG/24Hr(s) Patch 1 patch Transdermal daily    MEDICATIONS  (PRN):  chlorproMAZINE    Injectable 100 milliGRAM(s) IntraMuscular once PRN severe agitation or aggression  chlorproMAZINE    Tablet 100 milliGRAM(s) Oral every 4 hours PRN severe agitation or aggression  diphenhydrAMINE 50 milliGRAM(s) Oral every 6 hours PRN EPS ppx  LORazepam     Tablet 2 milliGRAM(s) Oral every 6 hours PRN agitation  LORazepam   Injectable 3 milliGRAM(s) IntraMuscular once PRN agitation  
MEDICATIONS  (STANDING):  benztropine 2 milliGRAM(s) Oral at bedtime  diVALproex ER 1000 milliGRAM(s) Oral at bedtime  melatonin. 5 milliGRAM(s) Oral at bedtime  paliperidone ER 6 milliGRAM(s) Oral at bedtime  QUEtiapine 300 milliGRAM(s) Oral at bedtime    MEDICATIONS  (PRN):  chlorproMAZINE    Injectable 100 milliGRAM(s) IntraMuscular once PRN severe agitation or aggression  chlorproMAZINE    Tablet 100 milliGRAM(s) Oral every 4 hours PRN severe agitation or aggression  diphenhydrAMINE 50 milliGRAM(s) Oral every 6 hours PRN EPS ppx  LORazepam     Tablet 3 milliGRAM(s) Oral every 6 hours PRN agitation  LORazepam     Tablet 2 milliGRAM(s) Oral every 2 hours PRN CIWA score increase by 2 points and current CIWA score GREATER THAN 9  LORazepam   Injectable 3 milliGRAM(s) IntraMuscular once PRN agitation  
MEDICATIONS  (STANDING):  benztropine 2 milliGRAM(s) Oral at bedtime  diVALproex ER 1000 milliGRAM(s) Oral at bedtime  melatonin. 5 milliGRAM(s) Oral at bedtime  paliperidone ER 6 milliGRAM(s) Oral at bedtime  QUEtiapine 300 milliGRAM(s) Oral at bedtime    MEDICATIONS  (PRN):  chlorproMAZINE    Injectable 100 milliGRAM(s) IntraMuscular once PRN severe agitation or aggression  chlorproMAZINE    Tablet 100 milliGRAM(s) Oral every 4 hours PRN severe agitation or aggression  diphenhydrAMINE 50 milliGRAM(s) Oral every 6 hours PRN EPS ppx  LORazepam     Tablet 3 milliGRAM(s) Oral every 6 hours PRN agitation  LORazepam     Tablet 2 milliGRAM(s) Oral every 2 hours PRN CIWA score increase by 2 points and current CIWA score GREATER THAN 9  LORazepam   Injectable 3 milliGRAM(s) IntraMuscular once PRN agitation  
MEDICATIONS  (STANDING):  benztropine 2 milliGRAM(s) Oral at bedtime  melatonin. 5 milliGRAM(s) Oral at bedtime  paliperidone ER 6 milliGRAM(s) Oral at bedtime  QUEtiapine 300 milliGRAM(s) Oral at bedtime    MEDICATIONS  (PRN):  chlorproMAZINE    Injectable 100 milliGRAM(s) IntraMuscular once PRN severe agitation or aggression  chlorproMAZINE    Tablet 100 milliGRAM(s) Oral every 4 hours PRN severe agitation or aggression  diphenhydrAMINE 50 milliGRAM(s) Oral every 6 hours PRN agitation/eps ppx  diphenhydrAMINE   Injectable 50 milliGRAM(s) IntraMuscular once PRN severe agitation/eps ppx  fluPHENAZine 5 milliGRAM(s) Oral every 6 hours PRN agitation  fluPHENAZine  Injectable 5 milliGRAM(s) IntraMuscular once PRN severe agitation  LORazepam     Tablet 2 milliGRAM(s) Oral every 6 hours PRN agitation  LORazepam     Tablet 2 milliGRAM(s) Oral every 2 hours PRN CIWA score increase by 2 points and current CIWA score GREATER THAN 9  LORazepam   Injectable 2 milliGRAM(s) IntraMuscular once PRN severe agitation  
MEDICATIONS  (STANDING):  ARIPiprazole 15 milliGRAM(s) Oral at bedtime  benztropine 2 milliGRAM(s) Oral at bedtime  fluPHENAZine 30 milliGRAM(s) Oral at bedtime  lithium SR (LITHOBID) 900 milliGRAM(s) Oral at bedtime  melatonin. 5 milliGRAM(s) Oral at bedtime  nicotine - 21 mG/24Hr(s) Patch 1 patch Transdermal daily    MEDICATIONS  (PRN):  chlorproMAZINE    Injectable 100 milliGRAM(s) IntraMuscular once PRN severe agitation or aggression  chlorproMAZINE    Tablet 100 milliGRAM(s) Oral every 4 hours PRN severe agitation or aggression  diphenhydrAMINE 50 milliGRAM(s) Oral every 6 hours PRN EPS ppx  LORazepam   Injectable 3 milliGRAM(s) IntraMuscular once PRN agitation  
MEDICATIONS  (STANDING):  ARIPiprazole 10 milliGRAM(s) Oral at bedtime  benztropine 2 milliGRAM(s) Oral at bedtime  fluPHENAZine 30 milliGRAM(s) Oral at bedtime  lithium SR (LITHOBID) 900 milliGRAM(s) Oral at bedtime  melatonin. 5 milliGRAM(s) Oral at bedtime  nicotine - 21 mG/24Hr(s) Patch 1 patch Transdermal daily    MEDICATIONS  (PRN):  chlorproMAZINE    Injectable 100 milliGRAM(s) IntraMuscular once PRN severe agitation or aggression  chlorproMAZINE    Tablet 100 milliGRAM(s) Oral every 4 hours PRN severe agitation or aggression  diphenhydrAMINE 50 milliGRAM(s) Oral every 6 hours PRN EPS ppx  LORazepam   Injectable 3 milliGRAM(s) IntraMuscular once PRN agitation  
MEDICATIONS  (STANDING):  ARIPiprazole 5 milliGRAM(s) Oral at bedtime  benztropine 2 milliGRAM(s) Oral at bedtime  fluPHENAZine 25 milliGRAM(s) Oral at bedtime  lithium SR (LITHOBID) 1200 milliGRAM(s) Oral at bedtime  melatonin. 5 milliGRAM(s) Oral at bedtime  nicotine - 21 mG/24Hr(s) Patch 1 patch Transdermal daily    MEDICATIONS  (PRN):  chlorproMAZINE    Injectable 100 milliGRAM(s) IntraMuscular once PRN severe agitation or aggression  chlorproMAZINE    Tablet 100 milliGRAM(s) Oral every 4 hours PRN severe agitation or aggression  diphenhydrAMINE 50 milliGRAM(s) Oral every 6 hours PRN EPS ppx  LORazepam     Tablet 2 milliGRAM(s) Oral every 6 hours PRN agitation  LORazepam   Injectable 3 milliGRAM(s) IntraMuscular once PRN agitation  
MEDICATIONS  (STANDING):  ARIPiprazole 2 milliGRAM(s) Oral at bedtime  benztropine 2 milliGRAM(s) Oral at bedtime  fluPHENAZine 20 milliGRAM(s) Oral at bedtime  lithium SR (LITHOBID) 1200 milliGRAM(s) Oral at bedtime  melatonin. 5 milliGRAM(s) Oral at bedtime  nicotine - 21 mG/24Hr(s) Patch 1 patch Transdermal daily    MEDICATIONS  (PRN):  chlorproMAZINE    Injectable 100 milliGRAM(s) IntraMuscular once PRN severe agitation or aggression  chlorproMAZINE    Tablet 100 milliGRAM(s) Oral every 4 hours PRN severe agitation or aggression  diphenhydrAMINE 50 milliGRAM(s) Oral every 6 hours PRN EPS ppx  LORazepam     Tablet 2 milliGRAM(s) Oral every 6 hours PRN agitation  LORazepam   Injectable 3 milliGRAM(s) IntraMuscular once PRN agitation  
MEDICATIONS  (STANDING):  ARIPiprazole 15 milliGRAM(s) Oral at bedtime  benztropine 2 milliGRAM(s) Oral at bedtime  fluPHENAZine 30 milliGRAM(s) Oral at bedtime  lithium SR (LITHOBID) 600 milliGRAM(s) Oral at bedtime  melatonin. 5 milliGRAM(s) Oral at bedtime  nicotine - 21 mG/24Hr(s) Patch 1 patch Transdermal daily    MEDICATIONS  (PRN):  chlorproMAZINE    Injectable 100 milliGRAM(s) IntraMuscular once PRN severe agitation or aggression  chlorproMAZINE    Tablet 100 milliGRAM(s) Oral every 4 hours PRN severe agitation or aggression  diphenhydrAMINE 50 milliGRAM(s) Oral every 6 hours PRN EPS ppx  LORazepam   Injectable 3 milliGRAM(s) IntraMuscular once PRN agitation  
MEDICATIONS  (STANDING):  ARIPiprazole 15 milliGRAM(s) Oral at bedtime  benztropine 2 milliGRAM(s) Oral at bedtime  fluPHENAZine 15 milliGRAM(s) Oral at bedtime  lithium CR (ESKALITH-CR) 1350 milliGRAM(s) Oral at bedtime  melatonin. 5 milliGRAM(s) Oral at bedtime  nicotine - 21 mG/24Hr(s) Patch 1 patch Transdermal daily    MEDICATIONS  (PRN):  chlorproMAZINE    Injectable 100 milliGRAM(s) IntraMuscular once PRN severe agitation or aggression  chlorproMAZINE    Tablet 100 milliGRAM(s) Oral every 4 hours PRN severe agitation or aggression  diphenhydrAMINE 50 milliGRAM(s) Oral every 6 hours PRN EPS ppx  LORazepam   Injectable 3 milliGRAM(s) IntraMuscular once PRN agitation  LORazepam   Injectable 1 milliGRAM(s) IntraMuscular once PRN severe agitation  LORazepam   Injectable 1 milliGRAM(s) IntraMuscular once PRN severe agitation  
MEDICATIONS  (STANDING):  ARIPiprazole 15 milliGRAM(s) Oral at bedtime  benztropine 2 milliGRAM(s) Oral at bedtime  fluPHENAZine 30 milliGRAM(s) Oral at bedtime  lithium SR (LITHOBID) 900 milliGRAM(s) Oral at bedtime  melatonin. 5 milliGRAM(s) Oral at bedtime  nicotine - 21 mG/24Hr(s) Patch 1 patch Transdermal daily    MEDICATIONS  (PRN):  chlorproMAZINE    Injectable 100 milliGRAM(s) IntraMuscular once PRN severe agitation or aggression  chlorproMAZINE    Tablet 100 milliGRAM(s) Oral every 4 hours PRN severe agitation or aggression  diphenhydrAMINE 50 milliGRAM(s) Oral every 6 hours PRN EPS ppx  LORazepam   Injectable 3 milliGRAM(s) IntraMuscular once PRN agitation  
MEDICATIONS  (STANDING):  ARIPiprazole 15 milliGRAM(s) Oral at bedtime  benztropine 2 milliGRAM(s) Oral at bedtime  fluPHENAZine 15 milliGRAM(s) Oral at bedtime  lithium CR (ESKALITH-CR) 900 milliGRAM(s) Oral at bedtime  melatonin. 5 milliGRAM(s) Oral at bedtime  nicotine - 21 mG/24Hr(s) Patch 1 patch Transdermal daily    MEDICATIONS  (PRN):  chlorproMAZINE    Injectable 100 milliGRAM(s) IntraMuscular once PRN severe agitation or aggression  chlorproMAZINE    Injectable 100 milliGRAM(s) IntraMuscular once PRN severe agitation or aggression  chlorproMAZINE    Tablet 100 milliGRAM(s) Oral every 4 hours PRN severe agitation or aggression  diphenhydrAMINE 50 milliGRAM(s) Oral every 6 hours PRN EPS ppx  LORazepam     Tablet 2 milliGRAM(s) Oral every 6 hours PRN agitation  LORazepam   Injectable 3 milliGRAM(s) IntraMuscular once PRN agitation  
MEDICATIONS  (STANDING):  ARIPiprazole 2 milliGRAM(s) Oral at bedtime  benztropine 2 milliGRAM(s) Oral at bedtime  fluPHENAZine 20 milliGRAM(s) Oral at bedtime  lithium SR (LITHOBID) 900 milliGRAM(s) Oral at bedtime  melatonin. 5 milliGRAM(s) Oral at bedtime  nicotine - 21 mG/24Hr(s) Patch 1 patch Transdermal daily    MEDICATIONS  (PRN):  chlorproMAZINE    Injectable 100 milliGRAM(s) IntraMuscular once PRN severe agitation or aggression  chlorproMAZINE    Tablet 100 milliGRAM(s) Oral every 4 hours PRN severe agitation or aggression  diphenhydrAMINE 50 milliGRAM(s) Oral every 6 hours PRN EPS ppx  LORazepam     Tablet 2 milliGRAM(s) Oral every 6 hours PRN agitation  LORazepam   Injectable 3 milliGRAM(s) IntraMuscular once PRN agitation  
MEDICATIONS  (STANDING):  ARIPiprazole 5 milliGRAM(s) Oral at bedtime  benztropine 2 milliGRAM(s) Oral at bedtime  fluPHENAZine 30 milliGRAM(s) Oral at bedtime  lithium SR (LITHOBID) 1200 milliGRAM(s) Oral at bedtime  melatonin. 5 milliGRAM(s) Oral at bedtime  nicotine - 21 mG/24Hr(s) Patch 1 patch Transdermal daily    MEDICATIONS  (PRN):  chlorproMAZINE    Injectable 100 milliGRAM(s) IntraMuscular once PRN severe agitation or aggression  chlorproMAZINE    Tablet 100 milliGRAM(s) Oral every 4 hours PRN severe agitation or aggression  diphenhydrAMINE 50 milliGRAM(s) Oral every 6 hours PRN EPS ppx  LORazepam     Tablet 2 milliGRAM(s) Oral every 6 hours PRN agitation  LORazepam   Injectable 3 milliGRAM(s) IntraMuscular once PRN agitation  
MEDICATIONS  (STANDING):  ARIPiprazole 2 milliGRAM(s) Oral at bedtime  benztropine 2 milliGRAM(s) Oral at bedtime  fluPHENAZine 15 milliGRAM(s) Oral at bedtime  lithium SR (LITHOBID) 900 milliGRAM(s) Oral at bedtime  melatonin. 5 milliGRAM(s) Oral at bedtime    MEDICATIONS  (PRN):  chlorproMAZINE    Injectable 100 milliGRAM(s) IntraMuscular once PRN severe agitation or aggression  chlorproMAZINE    Tablet 100 milliGRAM(s) Oral every 4 hours PRN severe agitation or aggression  diphenhydrAMINE 50 milliGRAM(s) Oral every 6 hours PRN EPS ppx  LORazepam     Tablet 2 milliGRAM(s) Oral every 6 hours PRN agitation  LORazepam   Injectable 3 milliGRAM(s) IntraMuscular once PRN agitation  
MEDICATIONS  (STANDING):  benztropine 2 milliGRAM(s) Oral at bedtime  diVALproex ER 1000 milliGRAM(s) Oral at bedtime  melatonin. 5 milliGRAM(s) Oral at bedtime  paliperidone ER 9 milliGRAM(s) Oral at bedtime  QUEtiapine 300 milliGRAM(s) Oral at bedtime    MEDICATIONS  (PRN):  chlorproMAZINE    Injectable 100 milliGRAM(s) IntraMuscular once PRN severe agitation or aggression  chlorproMAZINE    Tablet 100 milliGRAM(s) Oral every 4 hours PRN severe agitation or aggression  diphenhydrAMINE 50 milliGRAM(s) Oral every 6 hours PRN EPS ppx  LORazepam     Tablet 3 milliGRAM(s) Oral every 6 hours PRN agitation  LORazepam     Tablet 2 milliGRAM(s) Oral every 2 hours PRN CIWA score increase by 2 points and current CIWA score GREATER THAN 9  LORazepam   Injectable 3 milliGRAM(s) IntraMuscular once PRN agitation  
MEDICATIONS  (STANDING):  benztropine 2 milliGRAM(s) Oral at bedtime  melatonin. 5 milliGRAM(s) Oral at bedtime  paliperidone ER 6 milliGRAM(s) Oral at bedtime  QUEtiapine 300 milliGRAM(s) Oral at bedtime    MEDICATIONS  (PRN):  chlorproMAZINE    Injectable 100 milliGRAM(s) IntraMuscular once PRN severe agitation or aggression  chlorproMAZINE    Tablet 100 milliGRAM(s) Oral every 4 hours PRN severe agitation or aggression  diphenhydrAMINE 50 milliGRAM(s) Oral every 6 hours PRN agitation/eps ppx  diphenhydrAMINE   Injectable 50 milliGRAM(s) IntraMuscular once PRN severe agitation/eps ppx  fluPHENAZine 5 milliGRAM(s) Oral every 6 hours PRN agitation  fluPHENAZine  Injectable 5 milliGRAM(s) IntraMuscular once PRN severe agitation  LORazepam     Tablet 2 milliGRAM(s) Oral every 6 hours PRN agitation  LORazepam     Tablet 2 milliGRAM(s) Oral every 2 hours PRN CIWA score increase by 2 points and current CIWA score GREATER THAN 9  LORazepam   Injectable 2 milliGRAM(s) IntraMuscular once PRN severe agitation  
MEDICATIONS  (STANDING):  ARIPiprazole 15 milliGRAM(s) Oral at bedtime  benztropine 2 milliGRAM(s) Oral at bedtime  fluPHENAZine 15 milliGRAM(s) Oral at bedtime  lithium SR (LITHOBID) 900 milliGRAM(s) Oral at bedtime  melatonin. 5 milliGRAM(s) Oral at bedtime  nicotine - 21 mG/24Hr(s) Patch 1 patch Transdermal daily    MEDICATIONS  (PRN):  chlorproMAZINE    Injectable 100 milliGRAM(s) IntraMuscular once PRN severe agitation or aggression  chlorproMAZINE    Injectable 100 milliGRAM(s) IntraMuscular once PRN severe agitation or aggression  chlorproMAZINE    Tablet 100 milliGRAM(s) Oral every 4 hours PRN severe agitation or aggression  diphenhydrAMINE 50 milliGRAM(s) Oral every 6 hours PRN EPS ppx  LORazepam     Tablet 2 milliGRAM(s) Oral every 6 hours PRN agitation  LORazepam   Injectable 3 milliGRAM(s) IntraMuscular once PRN agitation  
MEDICATIONS  (STANDING):  ARIPiprazole 10 milliGRAM(s) Oral at bedtime  benztropine 2 milliGRAM(s) Oral at bedtime  fluPHENAZine 30 milliGRAM(s) Oral at bedtime  lithium SR (LITHOBID) 900 milliGRAM(s) Oral at bedtime  melatonin. 5 milliGRAM(s) Oral at bedtime  nicotine - 21 mG/24Hr(s) Patch 1 patch Transdermal daily    MEDICATIONS  (PRN):  chlorproMAZINE    Injectable 100 milliGRAM(s) IntraMuscular once PRN severe agitation or aggression  chlorproMAZINE    Tablet 100 milliGRAM(s) Oral every 4 hours PRN severe agitation or aggression  diphenhydrAMINE 50 milliGRAM(s) Oral every 6 hours PRN EPS ppx  LORazepam   Injectable 3 milliGRAM(s) IntraMuscular once PRN agitation  
MEDICATIONS  (STANDING):  ARIPiprazole 15 milliGRAM(s) Oral at bedtime  benztropine 2 milliGRAM(s) Oral at bedtime  fluPHENAZine 30 milliGRAM(s) Oral at bedtime  lithium SR (LITHOBID) 600 milliGRAM(s) Oral at bedtime  melatonin. 5 milliGRAM(s) Oral at bedtime  nicotine - 21 mG/24Hr(s) Patch 1 patch Transdermal daily    MEDICATIONS  (PRN):  chlorproMAZINE    Injectable 100 milliGRAM(s) IntraMuscular once PRN severe agitation or aggression  chlorproMAZINE    Tablet 100 milliGRAM(s) Oral every 4 hours PRN severe agitation or aggression  diphenhydrAMINE 50 milliGRAM(s) Oral every 6 hours PRN EPS ppx  LORazepam     Tablet 2 milliGRAM(s) Oral every 6 hours PRN agitation  LORazepam   Injectable 3 milliGRAM(s) IntraMuscular once PRN agitation  
MEDICATIONS  (STANDING):  ARIPiprazole 2 milliGRAM(s) Oral at bedtime  benztropine 2 milliGRAM(s) Oral at bedtime  fluPHENAZine 15 milliGRAM(s) Oral at bedtime  lithium SR (LITHOBID) 900 milliGRAM(s) Oral at bedtime  melatonin. 5 milliGRAM(s) Oral at bedtime    MEDICATIONS  (PRN):  chlorproMAZINE    Injectable 100 milliGRAM(s) IntraMuscular once PRN severe agitation or aggression  chlorproMAZINE    Tablet 100 milliGRAM(s) Oral every 4 hours PRN severe agitation or aggression  diphenhydrAMINE 50 milliGRAM(s) Oral every 6 hours PRN EPS ppx  LORazepam     Tablet 2 milliGRAM(s) Oral every 6 hours PRN agitation  LORazepam   Injectable 3 milliGRAM(s) IntraMuscular once PRN agitation  
MEDICATIONS  (STANDING):  benztropine 2 milliGRAM(s) Oral at bedtime  fluPHENAZine 10 milliGRAM(s) Oral at bedtime  lithium SR (LITHOBID) 300 milliGRAM(s) Oral at bedtime  melatonin. 5 milliGRAM(s) Oral at bedtime    MEDICATIONS  (PRN):  chlorproMAZINE    Injectable 100 milliGRAM(s) IntraMuscular once PRN severe agitation or aggression  chlorproMAZINE    Tablet 100 milliGRAM(s) Oral every 4 hours PRN severe agitation or aggression  diphenhydrAMINE 50 milliGRAM(s) Oral every 6 hours PRN EPS ppx  LORazepam     Tablet 2 milliGRAM(s) Oral every 6 hours PRN agitation  LORazepam     Tablet 2 milliGRAM(s) Oral every 2 hours PRN CIWA score increase by 2 points and current CIWA score GREATER THAN 9  LORazepam   Injectable 3 milliGRAM(s) IntraMuscular once PRN agitation  
MEDICATIONS  (STANDING):  ARIPiprazole 15 milliGRAM(s) Oral at bedtime  benztropine 2 milliGRAM(s) Oral at bedtime  fluPHENAZine 15 milliGRAM(s) Oral at bedtime  lithium SR (LITHOBID) 600 milliGRAM(s) Oral at bedtime  melatonin. 5 milliGRAM(s) Oral at bedtime  nicotine - 21 mG/24Hr(s) Patch 1 patch Transdermal daily    MEDICATIONS  (PRN):  chlorproMAZINE    Injectable 100 milliGRAM(s) IntraMuscular once PRN severe agitation or aggression  chlorproMAZINE    Tablet 100 milliGRAM(s) Oral every 4 hours PRN severe agitation or aggression  diphenhydrAMINE 50 milliGRAM(s) Oral every 6 hours PRN EPS ppx  LORazepam     Tablet 2 milliGRAM(s) Oral every 6 hours PRN agitation  LORazepam   Injectable 3 milliGRAM(s) IntraMuscular once PRN agitation  
MEDICATIONS  (STANDING):  ARIPiprazole 2 milliGRAM(s) Oral at bedtime  benztropine 2 milliGRAM(s) Oral at bedtime  fluPHENAZine 20 milliGRAM(s) Oral at bedtime  lithium SR (LITHOBID) 900 milliGRAM(s) Oral at bedtime  melatonin. 5 milliGRAM(s) Oral at bedtime  nicotine - 21 mG/24Hr(s) Patch 1 patch Transdermal daily    MEDICATIONS  (PRN):  chlorproMAZINE    Injectable 100 milliGRAM(s) IntraMuscular once PRN severe agitation or aggression  chlorproMAZINE    Tablet 100 milliGRAM(s) Oral every 4 hours PRN severe agitation or aggression  diphenhydrAMINE 50 milliGRAM(s) Oral every 6 hours PRN EPS ppx  LORazepam     Tablet 2 milliGRAM(s) Oral every 6 hours PRN agitation  LORazepam   Injectable 3 milliGRAM(s) IntraMuscular once PRN agitation  
MEDICATIONS  (STANDING):  ARIPiprazole 15 milliGRAM(s) Oral at bedtime  benztropine 2 milliGRAM(s) Oral at bedtime  fluPHENAZine 15 milliGRAM(s) Oral at bedtime  lithium CR (ESKALITH-CR) 1350 milliGRAM(s) Oral at bedtime  melatonin. 5 milliGRAM(s) Oral at bedtime  nicotine - 21 mG/24Hr(s) Patch 1 patch Transdermal daily    MEDICATIONS  (PRN):  chlorproMAZINE    Injectable 100 milliGRAM(s) IntraMuscular once PRN severe agitation or aggression  chlorproMAZINE    Tablet 100 milliGRAM(s) Oral every 4 hours PRN severe agitation or aggression  diphenhydrAMINE 50 milliGRAM(s) Oral every 6 hours PRN EPS ppx  LORazepam     Tablet 2 milliGRAM(s) Oral every 6 hours PRN agitation  LORazepam   Injectable 3 milliGRAM(s) IntraMuscular once PRN agitation  LORazepam   Injectable 1 milliGRAM(s) IntraMuscular once PRN severe agitation  LORazepam   Injectable 1 milliGRAM(s) IntraMuscular once PRN severe agitation  
MEDICATIONS  (STANDING):  ARIPiprazole 15 milliGRAM(s) Oral at bedtime  benztropine 2 milliGRAM(s) Oral at bedtime  fluPHENAZine 30 milliGRAM(s) Oral at bedtime  lithium SR (LITHOBID) 600 milliGRAM(s) Oral at bedtime  melatonin. 5 milliGRAM(s) Oral at bedtime  nicotine - 21 mG/24Hr(s) Patch 1 patch Transdermal daily    MEDICATIONS  (PRN):  chlorproMAZINE    Injectable 100 milliGRAM(s) IntraMuscular once PRN severe agitation or aggression  chlorproMAZINE    Tablet 100 milliGRAM(s) Oral every 4 hours PRN severe agitation or aggression  diphenhydrAMINE 50 milliGRAM(s) Oral every 6 hours PRN EPS ppx  LORazepam   Injectable 3 milliGRAM(s) IntraMuscular once PRN agitation  
MEDICATIONS  (STANDING):  ARIPiprazole 15 milliGRAM(s) Oral at bedtime  benztropine 2 milliGRAM(s) Oral at bedtime  fluPHENAZine 15 milliGRAM(s) Oral at bedtime  lithium CR (ESKALITH-CR) 1350 milliGRAM(s) Oral at bedtime  melatonin. 5 milliGRAM(s) Oral at bedtime  nicotine - 21 mG/24Hr(s) Patch 1 patch Transdermal daily    MEDICATIONS  (PRN):  chlorproMAZINE    Injectable 100 milliGRAM(s) IntraMuscular once PRN severe agitation or aggression  chlorproMAZINE    Tablet 100 milliGRAM(s) Oral every 4 hours PRN severe agitation or aggression  diphenhydrAMINE 50 milliGRAM(s) Oral every 6 hours PRN EPS ppx  LORazepam   Injectable 3 milliGRAM(s) IntraMuscular once PRN agitation  
MEDICATIONS  (STANDING):  ARIPiprazole 20 milliGRAM(s) Oral at bedtime  benztropine 2 milliGRAM(s) Oral at bedtime  fluPHENAZine 15 milliGRAM(s) Oral at bedtime  lithium CR (ESKALITH-CR) 1350 milliGRAM(s) Oral at bedtime  melatonin. 5 milliGRAM(s) Oral at bedtime  nicotine - 21 mG/24Hr(s) Patch 1 patch Transdermal daily    MEDICATIONS  (PRN):  chlorproMAZINE    Injectable 100 milliGRAM(s) IntraMuscular once PRN severe agitation or aggression  chlorproMAZINE    Tablet 100 milliGRAM(s) Oral every 4 hours PRN severe agitation or aggression  diphenhydrAMINE 50 milliGRAM(s) Oral every 6 hours PRN EPS ppx  
MEDICATIONS  (STANDING):  ARIPiprazole 20 milliGRAM(s) Oral at bedtime  benztropine 2 milliGRAM(s) Oral at bedtime  fluPHENAZine 15 milliGRAM(s) Oral at bedtime  lithium CR (ESKALITH-CR) 1350 milliGRAM(s) Oral at bedtime  melatonin. 5 milliGRAM(s) Oral at bedtime  nicotine - 21 mG/24Hr(s) Patch 1 patch Transdermal daily    MEDICATIONS  (PRN):  chlorproMAZINE    Injectable 100 milliGRAM(s) IntraMuscular once PRN severe agitation or aggression  chlorproMAZINE    Tablet 100 milliGRAM(s) Oral every 4 hours PRN severe agitation or aggression  diphenhydrAMINE 50 milliGRAM(s) Oral every 6 hours PRN EPS ppx

## 2022-02-24 NOTE — ED PROVIDER NOTE - CLINICAL SUMMARY MEDICAL DECISION MAKING FREE TEXT BOX
Khloe comes in for continued follow-up of her left mastectomy and axillary lymph node dissection.  She comes in to check for seroma.  Her drain was removed last week by Fabiola.  She has been occasionally having drainage from the drain site.    Physical exam:  Looks well.  Chest wall looks healthy.  No signs of infection.  She does still have some serous drainage from the drain site but no evidence of seroma in the chest wall.    Impression: Healing left mastectomy site.  Reassured her that the drainage is not that uncommon.  She should cleanse this daily with hydrogen peroxide and then keep a dressing with bacitracin on it until it stops draining.  I do not think it is going to give her a long-term problem.  From my standpoint it would be okay for her even to start radiation.  Follow-up with me will be in 6 months or sooner if this does not stop draining.  
pt with recent psych admission in ED with AMS, cough and abd pain.    will check CTs, EKG, labs, UA, meds and psych eval.  pt denies any SI or HI

## 2022-03-04 NOTE — ED BEHAVIORAL HEALTH ASSESSMENT NOTE - DEPENDENTS
[de-identified] : cough [FreeTextEntry6] : 8 year old girl BIB mother with c/o cough and congestion for the past 2 days. Brother with URI sx at home. No fever. No SOB, difficulty breathing, chest pain, or wheeze. No n/v/d. No headache, abdominal pain, sore throat or rash. No body aches or fatigue. No loss of smell or taste. Good po/uop/bm. Normal sleep and activity.\par  None known

## 2022-06-11 NOTE — BH INPATIENT PSYCHIATRY PROGRESS NOTE - NSTXDCOPNODATETRGT_PSY_ALL_CORE
05-Feb-2021
05-Mar-2021
05-Feb-2021
05-Feb-2021
12-Feb-2021
29-Jan-2021
05-Mar-2021
19-Feb-2021
29-Jan-2021
05-Feb-2021
18-Feb-2021
19-Feb-2021
05-Mar-2021
05-Mar-2021
18-Feb-2021
18-Feb-2021
15
29-Jan-2021
05-Feb-2021
05-Mar-2021
12-Feb-2021
18-Feb-2021
19-Feb-2021
19-Feb-2021
12-Feb-2021
18-Feb-2021
29-Jan-2021
29-Jan-2021
19-Feb-2021

## 2022-09-11 NOTE — BH PATIENT PROFILE - TOBACCO USE
History   Chief Complaint:  Arm Pain       HPI   Jennifer Stinson is a 70 year old female who presented to the ED with left arm pain. Patient with left upper arm pain and shoulder pain. Started last night night. Did have a slight fall the day before where she was trying to get out of bed and fell back catching herself with right arm. Some neck pain. No numbness, tingling, weakness, fever, chest pain, swelling.     Review of Systems  +arm pain, shoulder pain  Denies chest pain, sob  10 point review of systems was obtained and negative other than mentioned above.    Allergies:  Eggs Or Egg-Derived Products [Chicken-Derived Products (Egg)]  No Clinical Screening - See Comments  Nuts  Peanut-Derived    Medications:  amLODIPine (NORVASC) 10 MG tablet  aspirin  MG EC tablet  atorvastatin (LIPITOR) 40 MG tablet  levothyroxine (SYNTHROID/LEVOTHROID) 88 MCG tablet  lisinopril (ZESTRIL) 40 MG tablet  metoprolol succinate ER (TOPROL-XL) 50 MG 24 hr tablet  multiple vitamin  tablet TABS  omeprazole (PRILOSEC) 20 MG DR capsule      Past Medical History:     Patient Active Problem List   Diagnosis     Hyperlipidemia with target LDL less than 130     Essential hypertension     Gastroesophageal reflux disease without esophagitis     Other specified hypothyroidism     Abdominal pain     Stroke (H)     Cerebrovascular accident (CVA), unspecified mechanism (H)     Rheumatoid arthritis (H)        Past Surgical History:    Past Surgical History:   Procedure Laterality Date     CHOLECYSTECTOMY          Family History:    Family History   Problem Relation Age of Onset     Family History Negative Mother      Family History Negative Father      Family History Negative Maternal Grandmother      Family History Negative Maternal Grandfather      Family History Negative Paternal Grandmother      Family History Negative Paternal Grandfather      Diabetes No family hx of      Thyroid Disease No family hx of      Cancer No family hx of         Social History:  In ED with family     Physical Exam     Patient Vitals for the past 24 hrs:   BP Temp Temp src Pulse Resp SpO2   09/11/22 0600 (!) 146/89 -- -- 65 -- 97 %   09/11/22 0500 (!) 145/86 -- -- 63 -- 97 %   09/11/22 0455 -- -- -- -- -- 98 %   09/11/22 0450 (!) 154/89 -- -- 62 -- 99 %   09/11/22 0445 -- -- -- -- -- 96 %   09/11/22 0440 -- -- -- -- -- 99 %   09/11/22 0425 -- -- -- 62 13 99 %   09/11/22 0420 -- -- -- 65 13 97 %   09/11/22 0415 (!) 144/74 -- -- 61 12 97 %   09/11/22 0410 -- -- -- 63 15 99 %   09/11/22 0405 -- -- -- 66 13 99 %   09/11/22 0400 (!) 141/77 -- -- 63 15 98 %   09/11/22 0355 -- -- -- 66 13 98 %   09/11/22 0350 -- -- -- 63 14 97 %   09/11/22 0345 -- -- -- 68 15 99 %   09/11/22 0340 -- -- -- 69 13 99 %   09/11/22 0335 -- -- -- 71 18 99 %   09/11/22 0330 (!) 159/87 -- -- 66 16 99 %   09/11/22 0325 -- -- -- 68 17 100 %   09/11/22 0320 -- -- -- 71 12 100 %   09/11/22 0315 (!) 167/86 -- -- 69 12 100 %   09/11/22 0310 (!) 168/98 97.9  F (36.6  C) Oral 73 23 100 %   09/11/22 0305 (!) 165/103 -- -- 74 -- 99 %       Physical Exam  General: Sitting up in bed  Eyes:  The pupils are equal and round    Conjunctivae and sclerae are normal  ENT:    Wearing a mask  Neck:  Normal range of motion, no midline neck tenderness  CV:  Regular rate , regular rhythm    Radial pulse 2+ on left     Skin warm and well perfused   Resp:  Non labored breathing on room air    No tachypnea    No cough heard    Lungs clear bilaterally  GI:  Abdomen is soft, there is no rigidity    No distension    No rebound tenderness     No abdominal tenderness  MS:  Tender on left arm arm, left shoulder, left lateral neck. Pain on movement of left arm  Skin:  No rash or acute skin lesions noted  Neuro:   Awake, alert.      Speech is normal and fluent.    Face is symmetric.     SILT on left arm    STrength normal on left arm  Psych: Normal affect.  Appropriate interactions.    Emergency Department Course     ECG  ECG results  from 09/11/22   EKG 12 lead     Value    Systolic Blood Pressure     Diastolic Blood Pressure     Ventricular Rate 67    Atrial Rate 67    ND Interval 168    QRS Duration 82        QTc 441    P Axis 46    R AXIS 4    T Axis 28    Interpretation ECG      Sinus rhythm  Nonspecific T wave abnormality  Abnormal ECG  When compared with ECG of 28-NOV-2016 11:41,  T wave inversion no longer evident in Inferior leads  T wave inversion no longer evident in Anterior leads  Confirmed by GENERATED REPORT, COMPUTER ([id number]),  Hailey Lee ([id number]) on 9/11/2022 4:02:17 AM           Imaging:  XR Shoulder Left G/E 3 Views   Final Result   IMPRESSION: Mild degenerative changes left shoulder. Negative for fracture or dislocation.         Humerus XR,  G/E 2 views, left   Final Result   IMPRESSION: Mild degenerative changes left shoulder. Left humerus is negative for evidence of fracture or dislocation. Remainder unremarkable.         XR Chest 2 Views   Final Result   IMPRESSION: Negative chest.        Report per radiology    Laboratory:  Labs Ordered and Resulted from Time of ED Arrival to Time of ED Departure   BASIC METABOLIC PANEL - Abnormal       Result Value    Sodium 138      Potassium 3.5      Chloride 103      Carbon Dioxide (CO2) 31      Anion Gap 4      Urea Nitrogen 13      Creatinine 0.51 (*)     Calcium 8.5      Glucose 131 (*)     GFR Estimate >90     TROPONIN I - Normal    Troponin I High Sensitivity <3     CBC WITH PLATELETS AND DIFFERENTIAL    WBC Count 6.4      RBC Count 5.02      Hemoglobin 14.5      Hematocrit 44.5      MCV 89      MCH 28.9      MCHC 32.6      RDW 14.2      Platelet Count 251      % Neutrophils 53      % Lymphocytes 29      % Monocytes 14      % Eosinophils 3      % Basophils 0      % Immature Granulocytes 1      NRBCs per 100 WBC 0      Absolute Neutrophils 3.4      Absolute Lymphocytes 1.9      Absolute Monocytes 0.9      Absolute Eosinophils 0.2      Absolute Basophils 0.0       Absolute Immature Granulocytes 0.0      Absolute NRBCs 0.0        Emergency Department Course:       Reviewed:  I reviewed nursing notes, vitals and past medical history    Assessments:   I obtained history and examined the patient as noted above.    I rechecked the patien    Disposition:  The patient was discharged to home.     Impression & Plan     Medical Decision Making:  Jennifer Stinson is a 70 year old female who presented to the ED with arm pain. Patient with left upper arm pain. Wonder if the slight fall the day before may be contributing to her pain even though she did not fall onto her left side. Has tenderness on left upper arm, left shoulder on palpation and with movement. Musculoskeletal cause of pain suspected. Could be having radiculopathy. Doubt ACS and EKG and troponin unremarkable. Xray with degenerative changes. No skin changes to suggest infection. No fever to suggest infection. No swelling to suggest DVT. Discussed symptomatic at home. Follow-up with PCP.     Diagnosis:    ICD-10-CM    1. Acute pain of left shoulder  M25.512    2. Pain of left upper arm  M79.622        Discharge Medications:  Discharge Medication List as of 9/11/2022  7:06 AM      START taking these medications    Details   cyclobenzaprine (FLEXERIL) 10 MG tablet Take 0.5 tablets (5 mg) by mouth 3 times daily as needed for muscle spasms, Disp-15 tablet, R-0, E-Prescribe      Lidocaine (LIDOCARE) 4 % Patch Place 1 patch onto the skin every 12 hours as needed for moderate pain To prevent lidocaine toxicity, patient should be patch free for 12 hrs daily.Disp-15 patch, [id number]             Scribe Disclosure:  SHUBHAM, Vimal Chris, am serving as a scribe at 4:21 AM on 9/11/2022 to document services personally performed by Viktoriya Tejeda MD based on my observations and the provider's statements to me.       Viktoriya Tejeda MD  09/11/22 1327     Current every day smoker

## 2022-11-16 NOTE — ED BEHAVIORAL HEALTH ASSESSMENT NOTE - ADDITIONAL DETAILS ALL
-- DO NOT REPLY / DO NOT REPLY ALL --  -- Message is from Engagement Center Operations ([facility]) --    Referral Request  Name of Specialist: Fabiana  Provider's specialty: Urology    Medical condition for referral:  Yearly visit    Is this a NEW request?: yes      Referral ordered by: Bruce Bernheim      Insurance type: see below      Payor:  [employer] / Plan:  BE BA [id number] / Product Type:  BLUE ADVANTAGE      Preferred Delivery Method   Fax - number to send to: [phone]    Caller Information       Type Contact Phone/Fax    11/15/2022 02:58 PM CST Phone (Incoming) Lelia [phone]          Alternative phone number: none    Can a detailed message be left? Yes    Please give this turnaround time to the caller:   \"This message will be sent to [state Provider's full name]. The clinical team will return your call as soon as they review your message. Typically, it takes 3 business days to process referral requests.\"  
Referral authorized in epic and faxed to specialist office   
NA

## 2022-12-22 NOTE — BH INPATIENT PSYCHIATRY DISCHARGE NOTE - NSDCRECOMMEND_PSY_ALL_CORE
TRANSFER - OUT REPORT:    Verbal report given to Krissy Díaz RN(name) on Rudy Delacruz  being transferred to 578(unit) for routine progression of care       Report consisted of patients Situation, Background, Assessment and   Recommendations(SBAR). Information from the following report(s) SBAR, Kardex, ED Summary, MAR, and Recent Results was reviewed with the receiving nurse. Lines:   Peripheral IV 12/21/22 Left Antecubital (Active)        Opportunity for questions and clarification was provided.       Patient transported with:   O2 @ 2 liters  Tech Unrestricted diet/activity

## 2022-12-28 NOTE — BH INPATIENT PSYCHIATRY PROGRESS NOTE - NSBHMETABOLIC_PSY_ALL_CORE_FT
BMI: BMI (kg/m2): 22.4 (04-24-21 @ 15:52)  HbA1c: A1C with Estimated Average Glucose Result: 6.0 % (02-09-21 @ 10:10)    Glucose:   BP: 121/76 (05-04-21 @ 08:19) (109/64 - 121/76)  Lipid Panel: Date/Time: 02-09-21 @ 10:10  Cholesterol, Serum: 264  Direct LDL: --  HDL Cholesterol, Serum: 84  Total Cholesterol/HDL Ration Measurement: --  Triglycerides, Serum: 130  
BMI: BMI (kg/m2): 23.5 (04-14-21 @ 14:50)  HbA1c: A1C with Estimated Average Glucose Result: 6.0 % (02-09-21 @ 10:10)    Glucose:   BP: 101/58 (04-15-21 @ 08:16) (101/58 - 101/58)  Lipid Panel: Date/Time: 02-09-21 @ 10:10  Cholesterol, Serum: 264  Direct LDL: --  HDL Cholesterol, Serum: 84  Total Cholesterol/HDL Ration Measurement: --  Triglycerides, Serum: 130  
BMI: BMI (kg/m2): 23.5 (04-14-21 @ 14:50)  HbA1c: A1C with Estimated Average Glucose Result: 6.0 % (02-09-21 @ 10:10)    Glucose:   BP: 127/93 (04-21-21 @ 08:16) (127/93 - 127/93)  Lipid Panel: Date/Time: 02-09-21 @ 10:10  Cholesterol, Serum: 264  Direct LDL: --  HDL Cholesterol, Serum: 84  Total Cholesterol/HDL Ration Measurement: --  Triglycerides, Serum: 130  
BMI: BMI (kg/m2): 22.4 (04-24-21 @ 15:52)  HbA1c: A1C with Estimated Average Glucose Result: 6.0 % (02-09-21 @ 10:10)    Glucose:   BP: 109/64 (05-02-21 @ 07:49) (109/64 - 109/64)  Lipid Panel: Date/Time: 02-09-21 @ 10:10  Cholesterol, Serum: 264  Direct LDL: --  HDL Cholesterol, Serum: 84  Total Cholesterol/HDL Ration Measurement: --  Triglycerides, Serum: 130  
BMI: BMI (kg/m2): 22.4 (04-24-21 @ 15:52)  HbA1c: A1C with Estimated Average Glucose Result: 6.0 % (02-09-21 @ 10:10)    Glucose:   BP: 114/83 (05-10-21 @ 08:27) (84/52 - 114/83)  Lipid Panel: Date/Time: 02-09-21 @ 10:10  Cholesterol, Serum: 264  Direct LDL: --  HDL Cholesterol, Serum: 84  Total Cholesterol/HDL Ration Measurement: --  Triglycerides, Serum: 130  
BMI: BMI (kg/m2): 22.4 (04-24-21 @ 15:52)  HbA1c: A1C with Estimated Average Glucose Result: 6.0 % (02-09-21 @ 10:10)    Glucose:   BP: 114/83 (05-10-21 @ 08:27) (84/52 - 114/83)  Lipid Panel: Date/Time: 02-09-21 @ 10:10  Cholesterol, Serum: 264  Direct LDL: --  HDL Cholesterol, Serum: 84  Total Cholesterol/HDL Ration Measurement: --  Triglycerides, Serum: 130  
BMI: BMI (kg/m2): 23.5 (04-14-21 @ 14:50)  HbA1c: A1C with Estimated Average Glucose Result: 6.0 % (02-09-21 @ 10:10)    Glucose:   BP: --  Lipid Panel: Date/Time: 02-09-21 @ 10:10  Cholesterol, Serum: 264  Direct LDL: --  HDL Cholesterol, Serum: 84  Total Cholesterol/HDL Ration Measurement: --  Triglycerides, Serum: 130  
BMI: BMI (kg/m2): 22.4 (04-24-21 @ 15:52)  HbA1c: A1C with Estimated Average Glucose Result: 6.0 % (02-09-21 @ 10:10)    Glucose:   BP: --  Lipid Panel: Date/Time: 02-09-21 @ 10:10  Cholesterol, Serum: 264  Direct LDL: --  HDL Cholesterol, Serum: 84  Total Cholesterol/HDL Ration Measurement: --  Triglycerides, Serum: 130  
BMI: BMI (kg/m2): 22.4 (04-24-21 @ 15:52)  HbA1c: A1C with Estimated Average Glucose Result: 6.0 % (02-09-21 @ 10:10)    Glucose:   BP: --  Lipid Panel: Date/Time: 02-09-21 @ 10:10  Cholesterol, Serum: 264  Direct LDL: --  HDL Cholesterol, Serum: 84  Total Cholesterol/HDL Ration Measurement: --  Triglycerides, Serum: 130  
BMI: BMI (kg/m2): 23.5 (04-14-21 @ 14:50)  HbA1c: A1C with Estimated Average Glucose Result: 6.0 % (02-09-21 @ 10:10)    Glucose:   BP: 101/58 (04-15-21 @ 08:16) (101/58 - 101/58)  Lipid Panel: Date/Time: 02-09-21 @ 10:10  Cholesterol, Serum: 264  Direct LDL: --  HDL Cholesterol, Serum: 84  Total Cholesterol/HDL Ration Measurement: --  Triglycerides, Serum: 130  
BMI: BMI (kg/m2): 23.5 (04-14-21 @ 14:50)  HbA1c: A1C with Estimated Average Glucose Result: 6.0 % (02-09-21 @ 10:10)    Glucose:   BP: 97/63 (04-17-21 @ 08:10) (97/63 - 97/63)  Lipid Panel: Date/Time: 02-09-21 @ 10:10  Cholesterol, Serum: 264  Direct LDL: --  HDL Cholesterol, Serum: 84  Total Cholesterol/HDL Ration Measurement: --  Triglycerides, Serum: 130  
BMI: BMI (kg/m2): 23.5 (04-14-21 @ 14:50)  HbA1c: A1C with Estimated Average Glucose Result: 6.0 % (02-09-21 @ 10:10)    Glucose:   BP: --  Lipid Panel: Date/Time: 02-09-21 @ 10:10  Cholesterol, Serum: 264  Direct LDL: --  HDL Cholesterol, Serum: 84  Total Cholesterol/HDL Ration Measurement: --  Triglycerides, Serum: 130  
BMI: BMI (kg/m2): 22.4 (04-24-21 @ 15:52)  HbA1c: A1C with Estimated Average Glucose Result: 6.0 % (02-09-21 @ 10:10)    Glucose:   BP: --  Lipid Panel: Date/Time: 02-09-21 @ 10:10  Cholesterol, Serum: 264  Direct LDL: --  HDL Cholesterol, Serum: 84  Total Cholesterol/HDL Ration Measurement: --  Triglycerides, Serum: 130  
BMI: BMI (kg/m2): 22.4 (04-24-21 @ 15:52)  HbA1c: A1C with Estimated Average Glucose Result: 6.0 % (02-09-21 @ 10:10)    Glucose:   BP: 121/76 (05-04-21 @ 08:19) (121/76 - 121/76)  Lipid Panel: Date/Time: 02-09-21 @ 10:10  Cholesterol, Serum: 264  Direct LDL: --  HDL Cholesterol, Serum: 84  Total Cholesterol/HDL Ration Measurement: --  Triglycerides, Serum: 130  
BMI: BMI (kg/m2): 22.4 (04-24-21 @ 15:52)  HbA1c: A1C with Estimated Average Glucose Result: 6.0 % (02-09-21 @ 10:10)    Glucose:   BP: --  Lipid Panel: Date/Time: 02-09-21 @ 10:10  Cholesterol, Serum: 264  Direct LDL: --  HDL Cholesterol, Serum: 84  Total Cholesterol/HDL Ration Measurement: --  Triglycerides, Serum: 130  
BMI: BMI (kg/m2): 22.4 (04-24-21 @ 15:52)  HbA1c: A1C with Estimated Average Glucose Result: 6.0 % (02-09-21 @ 10:10)    Glucose:   BP: 121/76 (05-04-21 @ 08:19) (121/76 - 121/76)  Lipid Panel: Date/Time: 02-09-21 @ 10:10  Cholesterol, Serum: 264  Direct LDL: --  HDL Cholesterol, Serum: 84  Total Cholesterol/HDL Ration Measurement: --  Triglycerides, Serum: 130  
BMI: BMI (kg/m2): 22.4 (04-24-21 @ 15:52)  HbA1c: A1C with Estimated Average Glucose Result: 6.0 % (02-09-21 @ 10:10)    Glucose:   BP: --  Lipid Panel: Date/Time: 02-09-21 @ 10:10  Cholesterol, Serum: 264  Direct LDL: --  HDL Cholesterol, Serum: 84  Total Cholesterol/HDL Ration Measurement: --  Triglycerides, Serum: 130  
BMI: BMI (kg/m2): 22.4 (04-24-21 @ 15:52)  HbA1c: A1C with Estimated Average Glucose Result: 6.0 % (02-09-21 @ 10:10)    Glucose:   BP: --  Lipid Panel: Date/Time: 02-09-21 @ 10:10  Cholesterol, Serum: 264  Direct LDL: --  HDL Cholesterol, Serum: 84  Total Cholesterol/HDL Ration Measurement: --  Triglycerides, Serum: 130  
BMI: BMI (kg/m2): 23.5 (04-14-21 @ 14:50)  HbA1c: A1C with Estimated Average Glucose Result: 6.0 % (02-09-21 @ 10:10)    Glucose:   BP: 127/93 (04-21-21 @ 08:16) (127/93 - 127/93)  Lipid Panel: Date/Time: 02-09-21 @ 10:10  Cholesterol, Serum: 264  Direct LDL: --  HDL Cholesterol, Serum: 84  Total Cholesterol/HDL Ration Measurement: --  Triglycerides, Serum: 130  
BMI: BMI (kg/m2): 23.5 (04-14-21 @ 14:50)  HbA1c: A1C with Estimated Average Glucose Result: 6.0 % (02-09-21 @ 10:10)    Glucose:   BP: 97/63 (04-17-21 @ 08:10) (97/63 - 101/58)  Lipid Panel: Date/Time: 02-09-21 @ 10:10  Cholesterol, Serum: 264  Direct LDL: --  HDL Cholesterol, Serum: 84  Total Cholesterol/HDL Ration Measurement: --  Triglycerides, Serum: 130  
BMI: BMI (kg/m2): 22.4 (04-24-21 @ 15:52)  HbA1c: A1C with Estimated Average Glucose Result: 6.0 % (02-09-21 @ 10:10)    Glucose:   BP: 121/76 (05-04-21 @ 08:19) (121/76 - 121/76)  Lipid Panel: Date/Time: 02-09-21 @ 10:10  Cholesterol, Serum: 264  Direct LDL: --  HDL Cholesterol, Serum: 84  Total Cholesterol/HDL Ration Measurement: --  Triglycerides, Serum: 130  
BMI: BMI (kg/m2): 23.5 (04-14-21 @ 14:50)  HbA1c: A1C with Estimated Average Glucose Result: 6.0 % (02-09-21 @ 10:10)    Glucose:   BP: 97/63 (04-17-21 @ 08:10) (97/63 - 97/63)  Lipid Panel: Date/Time: 02-09-21 @ 10:10  Cholesterol, Serum: 264  Direct LDL: --  HDL Cholesterol, Serum: 84  Total Cholesterol/HDL Ration Measurement: --  Triglycerides, Serum: 130  
BMI: BMI (kg/m2): 23.5 (04-14-21 @ 14:50)  HbA1c: A1C with Estimated Average Glucose Result: 6.0 % (02-09-21 @ 10:10)    Glucose:   BP: 127/93 (04-21-21 @ 08:16) (127/93 - 127/93)  Lipid Panel: Date/Time: 02-09-21 @ 10:10  Cholesterol, Serum: 264  Direct LDL: --  HDL Cholesterol, Serum: 84  Total Cholesterol/HDL Ration Measurement: --  Triglycerides, Serum: 130  
Initial (On Arrival)

## 2023-01-01 NOTE — BH INPATIENT PSYCHIATRY PROGRESS NOTE - NSBHMSEAFFRANGE_PSY_A_CORE
Received patient from ER transport.  Patient was transferred from [place] to bed, tolerated without problems. Patient oriented to floor and use of call light, bed alarm in place.  Patient refused PIV to be restarted as she removed in ER, informed hospitalist on call--Katelin Mcguire. Patient also refused to remove her shoes and therefore unable to perform skin check /assessment to feet. Bed in low position, wheels locked, side rails up x2, personal belongings and call light within reach.   Constricted

## 2023-01-05 NOTE — BH DISCHARGE NOTE NURSING/SOCIAL WORK/PSYCH REHAB - NSDPACMPNYOTHER_GEN_ALL_CORE
Pt's mother will be picking up pt, as he has been court discharged.  warm and dry/color normal/normal/no rashes/no ulcers

## 2023-04-25 NOTE — BH INPATIENT PSYCHIATRY ASSESSMENT NOTE - PROFESSIONAL COLLATERAL NAME
Two page fax sent to [facility] for medical records [place].   [facility] Admit note and DC paperwork

## 2023-05-08 NOTE — BH PATIENT PROFILE - NSASFALLNEEDSASSIST_GEN_A_NUR
Problem: Pressure Injury Actual  Goal: # No deterioration in pressure injury (PI)  Outcome: Outcome Met, Continue evaluating goal progress toward completion  Goal: # Verbalizes pressure injury management  Description: Document education using the patient education activity.  Outcome: Outcome Met, Continue evaluating goal progress toward completion     Wound Clinic RN Visit (45 mins total)      Patient seen in wound clinic by wound care RN and Sarah ORTIZ.  No changes to treatment plan at this time.  No signs of infection noted. Wounds are more moist and macerated today, mextra used for absorptive cover dressing today for drainage management. Patient denies new questions or concerns.  Wound care complete as ordered and patient tolerated.     no

## 2023-06-19 NOTE — ED ADULT TRIAGE NOTE - BP NONINVASIVE SYSTOLIC (MM HG)
Physical Therapy Visit    Visit Type: Daily Treatment Note  Visit: 6  Referring Provider: Dion Soni MD  Medical Diagnosis (from order): Diagnosis Information    Diagnosis  726.31 (ICD-9-CM) - M77.00 (ICD-10-CM) - Epicondylitis elbow, medial         SUBJECTIVE                                                                                                               Better, but lifting any thing heavy is painful.      OBJECTIVE                                                                                                                                       Treatment     Therapeutic Exercise  Nu-step 6 minutes arms and legs both seat # 8, arms at 9.  C-ret.(cervical retraction) Added: self over pressure.  Supine C-ret with towel roll under head  *Supine total body stretch  Wrist Prayer Stretch  - 10 reps  Seated Eccentric Wrist Flexion with Dumbbell 1 #  - 10 reps  Seated Eccentric Wrist Extension  - 10 reps  Standing: shoulder horizontal abduction, mid and lower trapez strengthening.  Standing shoulders ext with 3 # dumbbell. 15 x 2  *Supine: HBH scap retaction, hold 4 sec. 15 x  * for HEP reviewed.    Manual Therapy   STM locally, and to the ms belly brachioradialis and pronator teres. With pt educ for self massage.  Supine and seated C-(cervical)distraction with subocc release, and ret mobs.--pt liked it.  ADDED: extension with segmental mobs      Skilled input: verbal instruction/cues, tactile instruction/cues, demonstration, posture correction, as detailed above and facilitation        ASSESSMENT                                                                                                            Decreased symptoms post manual therapy.  Pain/symptoms after session (out of 10): 2 and 1  Education:   - Results of above outlined education: Verbalizes understanding, Demonstrates understanding and Needs reinforcement    PLAN                                                                                                                            Suggestions for next session as indicated: Progress per plan of care       Therapy procedure time and total treatment time can be found documented on the Time Entry flowsheet     155

## 2023-07-15 NOTE — BH INPATIENT PSYCHIATRY PROGRESS NOTE - NSTXDISORGPROGRES_PSY_ALL_CORE
Met - goal discontinued
Met - goal discontinued
Improving
Met - goal discontinued
No Change
Met - goal discontinued
Improving
No Change
Improving
Met - goal discontinued
Met - goal discontinued
Improving
Improving
Met - goal discontinued
Met - goal discontinued
Improving
Met - goal discontinued
Improving
Improving
Patient/Caregiver provided printed discharge information.
Met - goal discontinued
Improving
Met - goal discontinued
Met - goal discontinued
No Change
Met - goal discontinued

## 2023-08-30 NOTE — BH INPATIENT PSYCHIATRY DISCHARGE NOTE - VIOLENCE RISK FACTORS:
Feeling of being under threat and being unable to control threat/Violent ideation/threat/speech/Substance abuse/Affective dysregulation/Impulsivity/Noncompliance with treatment/Irritability Doxepin Pregnancy And Lactation Text: This medication is Pregnancy Category C and it isn't known if it is safe during pregnancy. It is also excreted in breast milk and breast feeding isn't recommended.

## 2023-10-01 NOTE — BH INPATIENT PSYCHIATRY PROGRESS NOTE - NSBHMSEIMPULSE_PSY_A_CORE
Angioplasty of the ostium right coronary artery lesion. Inflation 1: Pressure = 24 megan; Duration = 30 sec. Inflation 2: Pressure = 24 megan; Duration = 10 sec. Normal

## 2023-10-11 NOTE — BH INPATIENT PSYCHIATRY PROGRESS NOTE - NSBHFUPINTERVALCCFT_PSY_A_CORE
Patient called in asking for Mell Cobian ATC to discuss his cast change, he is having c/o that the cast is digging into part of his leg/knee.  Mell spoke with patient and provided advice on how to self-modify the cast or pad it, but if unsuccessful, he should come back in to have it adjusted.      Julius Sewell ATC     Pt seen f/u for mood and psychosis

## 2023-10-14 NOTE — BH INPATIENT PSYCHIATRY ASSESSMENT NOTE - DESCRIPTION
This was a shared visit with the AWAIS. I reviewed and verified the documentation and independently performed the documented: unable to assess

## 2023-10-24 NOTE — BH INPATIENT PSYCHIATRY PROGRESS NOTE - NSTXSUICIDPROGRES_PSY_ALL_CORE
Xolair Pregnancy And Lactation Text: This medication is Pregnancy Category B and is considered safe during pregnancy. This medication is excreted in breast milk.
Met - goal discontinued
Improving

## 2023-10-27 NOTE — BH SAFETY PLAN - STEP 1 WARNING SIGNS
Patient seen in consultation for sigmoid diverticulitis by Jean Dash    HPI:  Patient is a 62 year old female with recent hospitalization for acute sigmoid diverticulitis with intramural abscess.  Subsequent CT scans due to ongoing pain have shown improvement of abscess but lingering inflammatory processes.  She recently finished her third course of antibiotics due to the ongoing process.  Her primary concern is difficulties going to the bathroom.  She just started taking a softener 2 days ago and this has helped somewhat.  X-ray done a couple days ago showed significant fecal burden in the colon.  She denies fevers, chills or palpitations.  She is tolerating a diet.  She has had a colonoscopy in the past but its been several years.  She is quite uncomfortable appearing and seems to not be able to catch her breath.  I asked her if this is her normal and she said yes.    Review Of Systems    Skin: negative  Ears/Nose/Throat: negative  Respiratory: Short of breath  Cardiovascular: negative  Gastrointestinal: as above  Genitourinary: negative  Musculoskeletal: negative  Neurologic: negative  Hematologic/Lymphatic/Immunologic: negative  Endocrine: negative      Past Medical History:   Diagnosis Date    Basal cell carcinoma     Breast cancer (H)     Obesity        Past Surgical History:   Procedure Laterality Date    BREAST SURGERY      left tissue expander, LEFT MASTECTOMY    CHOLECYSTECTOMY      GYN SURGERY      oophorectomy    REMOVE TISSUE EXPANDER BREAST  4/16/2014    Procedure: REMOVAL OF LEFT BREAST TISSUE EXPANDER, IRRIGATION AND DEBRIDEMENT OF LEFT BREAST;  Surgeon: Marlon Luz MD;  Location: [facility]       No family history on file.    Social History     Socioeconomic History    Marital status:      Spouse name: Not on file    Number of children: Not on file    Years of education: Not on file    Highest education level: Not on file   Occupational History    Not on file   Tobacco  Use    Smoking status: Former     Types: Cigarettes     Quit date: 2000     Years since quittin.8    Smokeless tobacco: Never   Vaping Use    Vaping Use: Never used   Substance and Sexual Activity    Alcohol use: Yes     Comment: social drinking    Drug use: Yes     Types: Marijuana     Comment: medical marijuana syrup     Sexual activity: Not Currently     Partners: Female, Male   Other Topics Concern    Not on file   Social History Narrative    Not on file     Social Determinants of Health     Financial Resource Strain: Low Risk  (10/25/2023)    Financial Resource Strain     Within the past 12 months, have you or your family members you live with been unable to get utilities (heat, electricity) when it was really needed?: No   Food Insecurity: Low Risk  (10/25/2023)    Food Insecurity     Within the past 12 months, did you worry that your food would run out before you got money to buy more?: No     Within the past 12 months, did the food you bought just not last and you didn t have money to get more?: No   Transportation Needs: Low Risk  (10/25/2023)    Transportation Needs     Within the past 12 months, has lack of transportation kept you from medical appointments, getting your medicines, non-medical meetings or appointments, work, or from getting things that you need?: No   Physical Activity: Not on file   Stress: Not on file   Social Connections: Not on file   Interpersonal Safety: Low Risk  (10/25/2023)    Interpersonal Safety     Do you feel physically and emotionally safe where you currently live?: Yes     Within the past 12 months, have you been hit, slapped, kicked or otherwise physically hurt by someone?: No     Within the past 12 months, have you been humiliated or emotionally abused in other ways by your partner or ex-partner?: No   Housing Stability: Low Risk  (10/25/2023)    Housing Stability     Do you have housing? : Yes     Are you worried about losing your housing?: No       Current  "Outpatient Medications   Medication Sig Dispense Refill    acetaminophen (TYLENOL) 500 MG tablet Take 1,000 mg by mouth every 6 hours as needed for mild pain      albuterol (PROAIR HFA/PROVENTIL HFA/VENTOLIN HFA) 108 (90 Base) MCG/ACT inhaler Inhale 2 puffs into the lungs every 4 hours as needed for shortness of breath or wheezing      anastrozole (ARIMIDEX) 1 MG tablet Take 1 tablet (1 mg) by mouth daily 90 tablet 1    calcium carbonate 600 mg-vitamin D 400 units (CALTRATE) 600-400 MG-UNIT per tablet Take 1 tablet by mouth      carvedilol (COREG) 3.125 MG tablet Take 1 tablet (3.125 mg) by mouth 2 times daily (with meals) 60 tablet 0    Ergocalciferol 50 MCG (2000 UT) TABS Take 2,000 Units by mouth daily      lisinopril (ZESTRIL) 10 MG tablet Take 1 tablet (10 mg) by mouth daily 30 tablet 0    medical cannabis (Patient's own supply) See Admin Instructions (The purpose of this order is to document that the patient reports taking medical cannabis.  This is not a prescription, and is not used to certify that the patient has a qualifying medical condition.)      melatonin 1 MG TABS tablet Take 3 tablets (3 mg) by mouth nightly as needed for sleep      PARoxetine (PAXIL) 20 MG tablet TAKE 1 TABLET BY MOUTH ONCE DAILY .  APPOINTMENT  NEEDED  FOR  ADDITIONAL  REFILLS (Patient taking differently: Take 20 mg by mouth daily) 90 tablet 1    traMADol (ULTRAM) 50 MG tablet Take 1 tablet (50 mg) by mouth every 6 hours as needed for severe pain 30 tablet 0    Vitamin D3 (VITAMIN D-1000 MAX ST) 25 mcg (1000 units) tablet Take 25 mcg by mouth daily      oxyCODONE (ROXICODONE) 5 MG tablet Take 1 tablet (5 mg) by mouth every 6 hours as needed for pain (Patient not taking: Reported on 10/25/2023) 10 tablet 0       Medications and history reviewed    Physical exam:  Vitals: /68   Temp 97.5  F (36.4  C) (Temporal)   Ht 1.648 m (5' 4.9\")   Wt 107.5 kg (237 lb)   BMI 39.56 kg/m    BMI= Body mass index is 39.56 " kg/m .    Constitutional: alert, mildly distressed  Head: normo-cephalic, atraumatic   Cardiovascular: RRR  Respiratory: equal chest rise, good respiratory effort, no audible wheeze  Gastrointestinal: Soft, mild diffuse tenderness with moderate tenderness in the left lower quadrant.  Nonacute abdominal exam.  : deferred  Musculoskeletal: moves all extremities   Skin: no suspicious lesions or rashes   Psychiatric: mentation appears normal, affect appropriate   Patient able to get up on table without difficulty.    Labs show:  No results found for this or any previous visit (from the past 24 hour(s)).    Imaging shows:  Recent Results (from the past 744 hour(s))   CT ABDOMEN PELVIS W CONTRAST    Narrative    CT ABDOMEN/PELVIS WITH CONTRAST September 29, 2023 12:22 PM    CLINICAL HISTORY: Left lower quadrant abdominal pain.    TECHNIQUE: CT scan of the abdomen and pelvis was performed following  injection of IV contrast. Multiplanar reformats were obtained. Dose  reduction techniques were used.  CONTRAST: ISOVUE-370, 100 mL.    COMPARISON: None.    FINDINGS:   LOWER CHEST: 0.3 cm indeterminate nodule in the right middle lobe  (series 3 image 3) was not included on the previous exam.    HEPATOBILIARY: Cholecystectomy. No hepatic masses.    PANCREAS: Normal.    SPLEEN: Normal.    ADRENAL GLANDS: Normal.    KIDNEYS/BLADDER: Nonobstructing 0.2 cm stone in the lower pole of the  right kidney. Small right renal cyst would require no specific  follow-up. Few tiny hypodensities in both kidneys are too small to  characterize, but may also represent cysts. No hydronephrosis.    BOWEL: Colonic diverticulosis. Focal bowel wall thickening with  moderate surrounding inflammatory stranding in the proximal sigmoid  colon, consistent with acute diverticulitis. Small area of low density  within the thickened sigmoid colon measures 1.7 cm, and could  represent a developing intramural abscess (series 3 image 174; series  4 image 43). No  . other associated fluid collections. No bowel  obstruction. Unremarkable appendix.    PELVIC ORGANS: Unremarkable.    LYMPH NODES: No enlarged lymph nodes are identified in the abdomen or  pelvis.    VASCULATURE: Unremarkable.    ADDITIONAL FINDINGS: None.    MUSCULOSKELETAL: Unremarkable.      Impression    IMPRESSION:   1.  Acute sigmoid diverticulitis.  2.  A 1.7 cm region of low density within the thickened sigmoid colon  could represent a developing intramural abscess.  3.  Indeterminate 0.3 cm right middle lobe pulmonary nodule. Please  refer to follow-up guidelines below.    Recommendations for one or multiple incidental lung nodules < 6mm :    Low risk patients: No routine follow-up.    High risk patients: Optional follow-up CT at 12 months; if  unchanged, no further follow-up.    *Low Risk: Minimal or absent history of smoking or other known risk  factors.  *Nonsolid (ground glass) or partly solid nodules may require longer  follow-up to exclude indolent adenocarcinoma.  *Recommendations based on Guidelines for the Management of Incidental  Pulmonary Nodules Detected at CT: From the Fleischner Society 2017,  Radiology 2017.  *Guidelines apply to incidental nodules in patients who are 35 years  or older.  *Guidelines do not apply to lung cancer screening, patients with  immunosuppression, or patients with known primary cancer.    DAYSI COHEN MD         SYSTEM ID:  [id number]   Echocardiogram Complete   Result Value    LVEF  25-30%    Narrative    [id number]  [id number]  [id number]^KALINA^MEJIA^JAKY     [facility]  Echocardiography Laboratory  [address] [place] [address]     Name: YADY WEINER  MRN: [id number]  : 1960  Study Date: 10/02/2023 11:30 AM  Age: 62 yrs  Gender: Female  Patient Location: [facility]  Reason For Study: Bradycardia - Sinus, LBBB  History: HTN,Breast Cancer  Ordering Physician: MEJIA GILMAN  Performed By: Terrie CARLSON  Margarito     BSA: 2.1 m2  Height: 65 in  Weight: 242 lb  HR: 61  BP: 151/78 mmHg  ______________________________________________________________________________  Procedure  Complete Portable Echo Adult. Optison (NDC #[id number]) given intravenously.  ______________________________________________________________________________  Interpretation Summary     The left ventricle is normal in size.  Left ventricular systolic function is severely reduced.  The visual ejection fraction is 25-30%.  Septal motion is consistent with conduction abnormality.  There is severe global hypokinesia of the left ventricle.  The right ventricle is normal in size and function.  Trace mitral tricuspid valve regurgitation.  Normal inferior vena cava.     There is no comparison study available.  ______________________________________________________________________________  Left Ventricle  The left ventricle is normal in size. There is mild eccentric left ventricular  hypertrophy. Left ventricular systolic function is severely reduced. The  visual ejection fraction is 25-30%. Grade I or early diastolic dysfunction.  Septal motion is consistent with conduction abnormality. There is severe  global hypokinesia of the left ventricle.     Right Ventricle  The right ventricle is normal in size and function.     Atria  Normal left atrial size. Right atrial size is normal. There is no atrial shunt  seen.     Mitral Valve  The mitral valve leaflets appear normal. There is no evidence of stenosis,  fluttering, or prolapse. There is trace mitral regurgitation. There is no  mitral valve stenosis.     Tricuspid Valve  Normal tricuspid valve. There is trace tricuspid regurgitation. The right  ventricular systolic pressure is approximated at 19.0 mmHg plus the right  atrial pressure. Right ventricle systolic pressure estimate normal.     Aortic Valve  The aortic valve is trileaflet. The aortic valve is not well visualized. No  aortic regurgitation is  present. No aortic stenosis is present.     Pulmonic Valve  The pulmonic valve is not well visualized. There is trace pulmonic valvular  regurgitation.     Vessels  The aortic root is normal size. Normal size ascending aorta. The inferior vena  cava is normal.     Pericardium  There is no pericardial effusion.     Rhythm  Sinus rhythm was noted.  ______________________________________________________________________________  MMode/2D Measurements & Calculations     IVSd: 1.1 cm  LVIDd: 5.6 cm  LVIDs: 4.9 cm  LVPWd: 0.87 cm  FS: 12.1 %  LV mass(C)d: 213.1 grams  LV mass(C)dI: 99.4 grams/m2  Ao root diam: 2.8 cm  LA dimension: 3.8 cm  asc Aorta Diam: 3.3 cm  LA/Ao: 1.4  Ao root diam index Ht(cm/m): 1.7  Ao root diam index BSA (cm/m2): 1.3  Asc Ao diam index BSA (cm/m2): 1.5  Asc Ao diam index Ht(cm/m): 2.0  LA Volume (BP): 59.7 ml     LA Volume Index (BP): 27.8 ml/m2  RV Base: 3.5 cm  RWT: 0.31  TAPSE: 2.7 cm     Doppler Measurements & Calculations  MV E max allan: 72.0 cm/sec  MV A max allan: 114.0 cm/sec  MV E/A: 0.63  MV dec time: 0.22 sec  Ao V2 max: 147.4 cm/sec  Ao max P.0 mmHg  LV V1 max PG: 3.7 mmHg  LV V1 max: 96.4 cm/sec  PA acc time: 0.09 sec  TR max allan: 218.2 cm/sec  TR max P.0 mmHg  AV Allan Ratio (DI): 0.65  E/E' av.2     Lateral E/e': 12.3  Medial E/e': 14.1  RV S Allan: 12.3 cm/sec     ______________________________________________________________________________  Report approved by: Dr Deloris Cyr 10/02/2023 01:02 PM         CT Abdomen pelvis w contrast*    Narrative    CT ABDOMEN PELVIS W CONTRAST 10/5/2023 11:35 AM    CLINICAL HISTORY: Patient with diverticulitis and intramural abscess.   Worsening abdominal exam.    TECHNIQUE: CT scan of the abdomen and pelvis was performed following  injection of IV contrast. Multiplanar reformats were obtained. Dose  reduction techniques were used.  CONTRAST: Isovue 370, 100mL    COMPARISON: 2023    FINDINGS:   LOWER CHEST:  Normal.    HEPATOBILIARY: Hepatic steatosis. Cholecystectomy.    PANCREAS: Normal.    SPLEEN: Normal.    ADRENAL GLANDS: Normal.    KIDNEYS/BLADDER: Few subcentimeter cysts in the kidneys requiring no  specific follow. No calculi, hydronephrosis or perinephric stranding.    BOWEL: Again seen are changes of sigmoid diverticulitis with marked  circumferential wall thickening of the diverticular segment of sigmoid  colon with surrounding fat stranding. Previously seen small intramural  phlegmon in the inflamed sigmoid colon has resolved. No perisigmoid  abscess or free air. Stable mild edema/enlargement of the adjacent  left ovary. No small bowel or colonic obstruction. Normal appendix.  Scattered diverticuli in the remainder of the colon.    PELVIC ORGANS: No pelvic masses.    ADDITIONAL FINDINGS: Multiple small lymph nodes in the sigmoid  mesentery measuring up to 6 mm are stable, likely reactive. No  abdominal aortic aneurysm. No free fluid in the pelvis.    MUSCULOSKELETAL: Unremarkable.      Impression    IMPRESSION:   1.  Acute sigmoid diverticulitis with resolution of previously seen  small intramural phlegmon, and otherwise stable perisigmoid  inflammatory changes. No new abscess. No free air.  2.  Remainder unchanged.    SHAYNA SHI MD         SYSTEM ID:  [id number]   CT Abdomen Pelvis w Contrast    Narrative    CT ABDOMEN PELVIS WITH CONTRAST 10/16/2023 11:32 AM    CLINICAL HISTORY: Left lower quadrant pain.    TECHNIQUE: CT scan of the abdomen and pelvis was performed following  injection of IV contrast. Multiplanar reformats were obtained. Dose  reduction techniques were used.  CONTRAST: 100mL, Isovue-370  COMPARISON: CT of the abdomen and pelvis performed 10/5/2023.    FINDINGS:   LOWER CHEST: The visualized lung bases are clear. Postoperative  changes in the left breast are partially included on this exam.    HEPATOBILIARY: Cholecystectomy. No hepatic masses are seen.    PANCREAS: Normal.    SPLEEN:  Normal.    ADRENAL GLANDS: Normal.    KIDNEYS/BLADDER: Small bilateral renal cysts would require no specific  follow-up. No hydronephrosis.    BOWEL: Colonic diverticulosis. Focal bowel wall thickening with  moderate surrounding inflammatory stranding in the sigmoid colon has  increased slightly since the previous exam, and is consistent with  acute diverticulitis. No associated fluid collection to suggest  diverticular abscess. No bowel obstruction. Unremarkable appendix.    PELVIC ORGANS: Unremarkable.    LYMPH NODES: No enlarged lymph nodes are identified in the abdomen or  pelvis.    VASCULATURE: Unremarkable.    ADDITIONAL FINDINGS: None.    MUSCULOSKELETAL: Unremarkable.      Impression    IMPRESSION:   Changes of acute sigmoid diverticulitis have increased slightly  compared to 10/5/2023. No associated abscess.    DAYSI COHEN MD         SYSTEM ID:  [id number]   XR Abdomen 2 Views    Narrative    XR ABDOMEN 2 VIEWS 10/25/2023 10:21 AM    HISTORY: Acute diverticulitis    COMPARISON: None.    FINDINGS: Nonobstructive bowel gas pattern. No air-fluid levels. No  gross free air. Large amount of formed stool in the colon. Surgical  clips in right upper quadrant. Surgical clips in the left axilla.    LURDES MONTIEL MD         SYSTEM ID:  [id number]        Assessment:     ICD-10-CM    1. Acute diverticulitis  K57.92 Adult General Surg Referral     Adult GI  Referral - Procedure Only        Plan: Ideally we would stay the course with nonsurgical management of her acute sigmoid diverticulitis.  I think at least part or may be even all of her issue at this point is difficulties going to the bathroom.  I recommend continuing the stool softeners and adding MiraLAX 1 or 2 times a day until she starts having bowel movements that do not require straining.  She can then back off on either the MiraLAX or the softeners until she feels she is regular.  Ultimately, I recommend follow-up diagnostic colonoscopy in 6  to 8 weeks after resolution of symptoms.  We discussed reasons to return to the emergency department including worsening pain, fevers, chills or inability to have a bowel movement.  She understands and agrees to the plan.    45 minutes spent by me on the date of the encounter doing chart review, history and exam, documentation and further activities per the note    Andre Trejo, DO

## 2023-11-20 ENCOUNTER — EMERGENCY (EMERGENCY)
Facility: HOSPITAL | Age: 48
LOS: 1 days | Discharge: HOME | End: 2023-11-20
Attending: EMERGENCY MEDICINE
Payer: MEDICARE

## 2023-11-20 VITALS
SYSTOLIC BLOOD PRESSURE: 128 MMHG | DIASTOLIC BLOOD PRESSURE: 76 MMHG | HEART RATE: 91 BPM | TEMPERATURE: 98 F | RESPIRATION RATE: 18 BRPM | OXYGEN SATURATION: 95 %

## 2023-11-20 PROCEDURE — 99283 EMERGENCY DEPT VISIT LOW MDM: CPT

## 2023-11-20 NOTE — ED PROVIDER NOTE - OBJECTIVE STATEMENT
47-year-old male patient with a history of schizophrenia presents to the ED from group home complaining of medical evaluation.  As per EMS, the patient left his group home yesterday to smoke fentanyl, also left today for an unknown reason.  group home called EMS for patient to be brought to the ED for evaluation for possible ingestion.  In the ED, patient denies any complaints, admits to smoking fentanyl yesterday but denies any drug use or alcohol use today.  Denies any suicidal homicidal ideation.  No falls or trauma.  No chest pain or shortness of breath.  No further complaints at this time

## 2023-11-20 NOTE — CHART NOTE - NSCHARTNOTEFT_GEN_A_CORE
SW note: Worker alerted by resident MD that pt is medically cleared and will need assist w/ d/c back to . Worker met w/ pt- pt A&Ox4. Pt reports that he cannot recall which shelter he was sent to- but confirmed that [facility] arranged it for him. Per DSS after hours (Ирина) pt was assigned to [address] [place] [address] in [facility] & that he needs to go to their offices before Friday to complete an application or he will be d/c. Pt made aware- in agreement w/ f/u in AM. Medicaid cab arranged via [employer] ([facility]ern[facility] RES [id number]). Pt provided w/ slip and information regarding DSS f/u. MD aware. Pts mother (ileana [phone]) alerted w/ pts consent.  No other SW needs.

## 2023-11-20 NOTE — ED PROVIDER NOTE - PHYSICAL EXAMINATION
Const: Awake, alert and oriented to person, place, & time. In no acute distress.   HEENT: NC/AT. Moist mucous membranes.  Eyes: PERRLA. No scleral icterus. EOMI.  Cardiac: Regular rate and regular rhythm. +S1/S2. Peripheral pulses 2+ and symmetric. No LE edema.  Resp: Speaking in full sentences, breath sounds equal and clear bilaterally. No wheezes, rales or rhonchi.  Abd: Soft, non-tender, non-distended.   MSK: Spine midline and non-tender. No CVAT.  Skin: No rashes, abrasions or lacerations.  Neuro: Moves all extremities symmetrically. No motor or sensory deficits   PSYCH: AAOx3, normal insight, no S/H TIP, no command hallucinations

## 2023-11-20 NOTE — ED PROVIDER NOTE - PATIENT PORTAL LINK FT
You can access the FollowMyHealth Patient Portal offered by [facility] by registering at the following website: [email or web address]. By joining [employer]’s FollowMyHealth portal, you will also be able to view your health information using other applications (apps) compatible with our system.

## 2023-11-20 NOTE — ED PROVIDER NOTE - SHIFT CHANGE DETAILS
Patient signed out pending   Social work, all further work up and management at the discretion of receiving physician.

## 2023-11-20 NOTE — ED PROVIDER NOTE - NS ED ROS FT
Review of Systems:  	•	CONSTITUTIONAL - no fever or chills. No weight loss  	•	RESPIRATORY - no shortness of breath, no cough  	•	CARDIAC - no chest pain, no palpitations  	•	GI - no abd pain, no nausea, no vomiting, no diarrhea, no constipation, no bleeding  	•	MUSCULOSKELETAL - no joint pain, no back pain, no neck pain  	•	NEUROLOGIC - no weakness, no headache, no gait abnormality, no LOC  	•	SKIN - No abrasions, no lacerations, no rashes  	•	PSYCH - no anxiety, non suicidal, non homicidal, no hallucination, no depression

## 2023-11-20 NOTE — ED ADULT NURSE NOTE - NSFALLHARMRISKINTERV_ED_ALL_ED

## 2023-11-20 NOTE — ED ADULT TRIAGE NOTE - CHIEF COMPLAINT QUOTE
Pt BIBA from group home, pt went out today and upon coming back, group home thinks he used fentanyl, admits to using yesterday, calm and cooperative, rambling

## 2023-11-20 NOTE — ED PROVIDER NOTE - CLINICAL SUMMARY MEDICAL DECISION MAKING FREE TEXT BOX
47-year-old male patient with a history of schizophrenia presents ED complaining of medical evaluation.  Patient  has no suicidal homicidal ideations, clear thought process, no hallucinations, admits to drug use yesterday but denies any drug use today.  No medical complaints, clinically sober.  Patient is unsure where his group home his, EMS came in with no paperwork.  Spoke with  neck, will attempt to contact patient's report and find out where he lives.  We will reconvene with social work once that information is  figured out.  Patient resting comfortably in bed at this time with no complaints.

## 2023-11-20 NOTE — ED PROVIDER NOTE - ATTENDING CONTRIBUTION TO CARE
I personally saw the patient with the resident, and completed the key components of the history and physical exam. I then discussed the management plan with the resident.    47-year-old male with past medical history of schizophrenia presents from a group home for medical evaluation.  Patient admitted to leaving yesterday and today, smoking fentanyl.  Patient currently not intoxicated.  Patient sent in for medical evaluation.  He has no complaints.  He is unsure of which group home he is coming from as he is only been there 2 days and it is either on the corner of [facility] or [facility] (no cross street provided) [facility]      NAD, dirty, disheveled, ANO x3, calm and cooperative.  No respiratory distress, moves all extremities symmetrically, no visible rashes or signs of outside trauma.    Social work involved to discern where patient came from, and what needs to be done in order to assist patient going back.

## 2023-11-28 NOTE — BH INPATIENT PSYCHIATRY ASSESSMENT NOTE - NSICDXBHPRIMARYDX_PSY_ALL_CORE
ICU PROCEDURE - CVC PLACEMENT    11/28/23        INDICATION: Hypotension. SITE: Right IJ    CONSENT: was obtained after explaining indication/risks to patient and/or next of kin    TIME OUT: taken    STERILE PREP: Complete handwashing care was performed by all involved personnel prior to initiation of the procedure. Full maximum sterile field/barrier technique was followed (with cap and mask, gloves and sterile gown, as well as broad field sterile drapes). Local disinfection was performed with broad field application of orange dyed chloraprep solution, which was allowed to dry fully prior to the initiation of the procedure. ESTIMATED BLOOD LOSS: <10 cc    ULTRASOUND GUIDANCE USED: Yes. PROCEDURE:  Using modified seldinger technique, the appropriate vessel was located with the introducer needle subsequent ultrasound and landmark assessment. The flexible J-tip wire was passed without difficulty or resistance, followed by minimal skin incisions over the introducer needle. The introducer needle was withdrawn and discarded, maintaining manual control of the guidewire at all times. After dilation of the tract, a preflushed 3 lumen CVC catheter was advanced over the guidewire without difficulty or resistance to its full extent. The guidewire was withdrawn and discarded and good return of nonpulsatile, dark venous blood assured through all lumes, with easy flushing of the lumens. The site was reprepped with chlorprep solution followed by a Biopatch device and secured in place utilizing sutures and occlusive dressing. Hemostasis was assured at the termination of procedure. COMPLICATIONS: None immediate. FOLLOW UP CXR: Order.      Electronically signed by Carlos Ramirez MD on 11/28/2023 at 3:36 PM Schizophrenia   F20.9

## 2023-12-15 NOTE — CHART NOTE - NSCHARTNOTESELECT_GEN_ALL_CORE
Edelmira Mcgraw (:  1948) is a 76 y.o. female,Established patient, here for evaluation of the following chief complaint(s):  Follow-up      Subjective   SUBJECTIVE/OBJECTIVE:  HPI  BPs have been /56-86  Tolerating medications well  Taking medications every day yes, compliant  Last Cr 0.9  Last K 3.9  Last LDL 56  Last HbA1C 5.1  Additional concerns worsening of RA especially in the feet, seeing rheumatologist NP in January, just doesn't feel like her medications are making any difference  Neuropathy has been progressing up her leg, never misses a dose of Lyrica  Face to face evaluation for wheeled walker with seat---having problems with balance especially in the morning, more weakness noted especially in the morning. Using cane at all times outside of the house but doesn't feel like it helps her stability especially in the morning and when going outside the house. Has stopped going to stores and doing her gardening due to instability. Hoping to restart some activity to help with weight loss but doesn't feel safe with cane use at this time. Patient has good upper arm strength. Plaquenil eye exam 2023    Review of Systems   Constitutional:  Positive for activity change (decrease due to foot pain even with orthotics) and fatigue. Negative for unexpected weight change. Respiratory:  Negative for cough, shortness of breath and wheezing. Cardiovascular:  Positive for leg swelling. Negative for chest pain and palpitations. Gastrointestinal:  Negative for abdominal pain, constipation, diarrhea, nausea and vomiting. Musculoskeletal:  Positive for arthralgias, back pain (chronic), gait problem, joint swelling, myalgias and neck stiffness (chronic). Neurological:  Positive for weakness. Negative for dizziness, light-headedness and headaches. Worsening neuropathy of feet          Objective   Physical Exam  Vitals reviewed. Constitutional:       Appearance: Normal appearance.
Event Note
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
## 2024-04-12 NOTE — BH PATIENT PROFILE - NSPROPOAURINARYCATHETER_GEN_A_NUR
[facility]   HISTORY AND PHYSICAL      Patient: Janae Wadsworth Date: 4/12/2024   female, 58 year old  Admit Date: 4/12/2024   Attending: Kleber Cortes MD;Jed*        PRIMARY CARE PROVIDER:  Hollis Eldridge MD     ATTENDING PHYSICIAN    Kleber Cortes MD;Jed*      CHIEF COMPLAINT    Came in for chest pain shortness for breath    HPI    Janae Wadsworth is a 58 year old female who presents to the hospital with complains of chest tightness with shortness of breath.  Symptoms started 2 days ago.  She also has a cough but only when she lays flat.  She also noticed a sore throat today.  Slight nausea but no vomiting or diarrhea denies any fever or chills at this time.    PAST MEDICAL HISTORY    Past Medical History:   Diagnosis Date    Abnormal CT scan of lung 10/23/2020    Acute bronchitis     Acute pancreatitis 6/1/2016    Anemia     Anxiety     Arthritis     Uses a Cane PRN    Arthritis     Bipolar disorder (CMD)     Blood transfusion 1985 & 1994    Bronchitis     Chronic anemia     Cigarette nicotine dependence with nicotine-induced disorder 10/23/2020    Coronary artery disease     Depression     Dyslipidemia     Esophageal reflux     GERD (gastroesophageal reflux disease)     Hypertension     Inflammatory bowel disease     Kidney Stone     Multiple Episodes, Last 2/2011    Osteoporosis     Other and unspecified hyperlipidemia     Other chronic pain     ankles, knees, elbows    Personal history of traumatic fracture     left ankle    Psoriasis     RAD (reactive airway disease)     as a child    Small bowel obstruction (CMD)     Tobacco abuse     Unspecified sinusitis (chronic)     Urinary tract infection        SURGICAL HISTORY    Past Surgical History:   Procedure Laterality Date    Abdomen surgery  2005    Stated part of bowel removed, SBO    Appendectomy  2005    Back surgery  1998    Lumbar Laminectomy    Colon surgery  6/11/2013    Laparotomy W/ Small Bowel Resection     Hemorrhoid surgery  6/15/2012    Anterior Hemorrhoidectomy & Left Lateral Sphincterotomy, Rx for Infection after .    Hernia repair  2005    Hysterectomy      Hysterectomy W / R Oopherectomy    Left heart cath,percutaneous  3/22/2012    Cardiac Cath    Left heart cath,percutaneous  2012    Cardiac Cath    Left heart cath,percutaneous  9/10/2013    Cardiac Cath    Left oophorectomy      Open access colonoscopy  2013    Colonoscopy, Screen    Removal gallbladder         FAMILY HISTORY    Family History   Problem Relation Age of Onset    Osteoarthritis Mother     Asthma Mother     Depression Mother     Diabetes Mother     Hypertension Mother     Kidney disease Mother     Vision Loss Mother     Psychiatric Mother     Heart disease Father          age 68    Hypertension Father     Hyperlipidemia Father     Stroke Father     Substance Abuse Father     Vision Loss Father     Clotting Disorder Father     Cancer, Breast Sister 49    Cancer Sister         breast    NEGATIVE FAMILY HX OF Sister     NEGATIVE FAMILY HX OF Sister     NEGATIVE FAMILY HX OF Sister     Asthma Brother         \"my brother had a lung problem, mystery to family. we never new.\"    Asthma Brother     Diabetes Brother     Hypertension Brother     Blood Disorder Brother         skin    Cancer Brother     NEGATIVE FAMILY HX OF Brother     NEGATIVE FAMILY HX OF Brother     NEGATIVE FAMILY HX OF Brother     Cancer Daughter         cervical    NEGATIVE FAMILY HX OF Son     Hearing Loss Maternal Grandmother     Cancer, Breast Maternal Aunt 20       SOCIAL HISTORY    Social History     Tobacco Use    Smoking status: Every Day     Current packs/day: 0.50     Average packs/day: 0.5 packs/day for 30.0 years (15.0 ttl pk-yrs)     Types: Cigarettes    Smokeless tobacco: Never   Substance Use Topics    Alcohol use: No    Drug use: Yes     Types: Cocaine     Comment: last use 2021       CURRENT MEDICATIONS    No outpatient medications have  been marked as taking for the 4/12/24 encounter (Hospital Encounter).        ALLERGIES    ALLERGIES:   Allergen Reactions    Levaquin SHORTNESS OF BREATH    Morphine ANXIETY and THROAT SWELLING     Tolerated IV hydromorphone     Penicillins SWELLING and THROAT SWELLING     Tolerated ceftriaxone on 8/12/16    Lactulose      Pt stated that she cannot have regular milk, but lactose-free and soy milk and other dairy and cheese products are okay.    Sulfa Drugs Cross Reactors VOMITING and NAUSEA    Vicodin [Hydrocodone-Acetaminophen] GI UPSET     Tolerates oxycodone/apap     Ambien VISUAL DISTURBANCE     Hallucinations    Lactose Intolerance [Milk Intolerance   (Food)] Other (See Comments)       REVIEW OF SYSTEMS    All 13 Review of Systems negative except for what's listed in the HPI.      PHYSICAL EXAM    Vital 24 Hour Range Most Recent Value   Temperature Temp  Min: 98.2 °F (36.8 °C)  Max: 98.2 °F (36.8 °C) 98.2 °F (36.8 °C)   Pulse Pulse  Min: 57  Max: 64 64   Respiratory Resp  Min: 16  Max: 20 20   Blood Pressure BP  Min: 154/86  Max: 187/88 (!) 159/87   Pulse Oximetry SpO2  Min: 98 %  Max: 100 % 98 %   Art. BP No data recorded     O2 No data recorded       Vital Most Recent Value First Value   Weight       Height       BMI   N/A       Examination:    Neurologic:  Alert and oriented x 3    Cranial nerves 2-12 grossly intact  HEENT: Pupils equal and Pupils reactive to light  Neck:  Supple and Non-tender  Chest:  Symmetric  Lungs:  Clear to auscultation bilaterally  Heart:  Regular rate and rhythm  Abdomen: Soft, Bowel sounds present, and Nontender  Extremities: cyanosis absent    Edema absent  Skin:  No evidence of rash      LABS      Recent Labs   Lab 04/12/24  1221 04/12/24  1128   SODIUM  --  141   POTASSIUM  --  3.5   CHLORIDE  --  111*   CO2  --  22   BUN  --  14   CREATININE 1.00* 0.94   GLUCOSE  --  89   WBC  --  8.1   HGB  --  12.2   HCT  --  35.8*   PLT  --  304     No results found  No results found for  this or any previous visit.   Results for orders placed or performed during the hospital encounter of 02/24/18   Urine Culture   Result Value Ref Range    Specimen Description URINE, CLEAN CATCH/MIDSTREAM     CULTURE       <10,000 CFU/mL MIXED BACTERIAL SOPHIA WITH NO PREDOMINATING TYPE    REPORT STATUS 02/26/2018 FINAL      Lab Results   Component Value Date    USPG 1.020 02/19/2023    UPROT Trace (A) 02/19/2023    UWBC Negative 02/19/2023    URBC Negative 02/19/2023    UPH 6.0 02/19/2023    UBACTR NONE SEEN 09/30/2019       IMAGING & OTHER STUDIES    XR CHEST AP OR PA    Result Date: 4/12/2024  Narrative: PROCEDURE:XR CHEST AP OR PA HISTORY: Shortness of breath. Chest tightness. COMPARISON: Chest x-ray June 12, 2023 FINDINGS:  Support Devices: None Cardiac Silhouette/Mediastinum/Radha: The cardiomediastinal silhouette is within normal limits. Atherosclerotic calcifications. Mild central pulmonary vasculature prominence. Lungs/Pleural Spaces: Mildly coarsened lung markings. No discernible pneumothorax. No sizable effusion. Minimal bibasilar atelectasis. Chest Wall/Diaphragm/Upper Abdomen: Degenerative changes. CONCLUSION(S): Mildly coarsened lung markings, nonspecific and may relate to chronic interstitial changes versus mild interstitial pulmonary edema. Clinical and laboratory correlation recommended. Electronically Signed by: Ramon Villareal MD Signed on: 4/12/2024 12:07 PM Created on Workstation ID: [id number] Signed on Workstation ID: [id number]       Assessment/Plan:     Shortness of breath -     troponin negative.  Shortness of breaths seems to be atypical and more related to an upper respiratory infection.  Patient's absolute eosinophils are significantly elevated.  This was also elevated in 2013.  What would lead to the eosinophilia?  Chest x-ray done today shows mildly coarsened lung markings there might be some chronic interstitial changes versus early pulmonary edema.  Will obtain a dedicated CT chest for  further evaluation.  COVID RSV and influenza panel negative.  Will obtain respiratory pathogen panel and keep patient on isolation  Essential hypertension - continue with home blood pressure medications.  Monitor blood pressures closely.  Gastroesophageal reflux disease - continue with PPI at this time  Psoriasis and psoriatic arthritis- has been on Enbrel in the past.  Anxiety and bipolar disorder-   also has previous depression continue with home medications.  Currently in remission.    DVT Prophylaxis - Lovenox          Tentative Discharge/Disposition: Home         Assign to Observation Status  Patient is being brought in for care that spans less than two consecutive midnights, starting from the time care was initiated at this facility under observational status.    Code Status:  Prior      Tee Adkins MD, MPH    4/12/2024    2:47 PM     no

## 2024-05-29 NOTE — ED PROVIDER NOTE - WET READ LAUNCH FT
Mobile Cardiac Telemetry (MCOT) Instructions     On 05/29/24 you had a Mobile Cardiac Telemetry Monitor (MCOT).  The monitor is to be worn for 4 weeks. Your monitor completion date is 06/28/24.  The monitor continuously records your heart rhythm.  Any recorded events are immediately sent to a cardiac technician for review.    Daily Use and Operation    DO  Keep the monitor within 30 feet of you at all times.  When you feel a symptom, press the \"Record Symptoms\" button and follow prompts on monitor.  Shower or exercise as normal while wearing the MCOT Patch.  Do not swim or take a bath.  Patch is water-resistant, not waterproof.  Follow your normal routine.  Don't avoid stress, work, or exercise.  It is important to record your heartbeat during your normal daily activities.  When the battery is low, use the supplied .  The monitor will show a warning message when the battery is low.  Charge the monitor daily with the  provided.  Address all alerts on your monitor promptly.    DO NOT  Bathe or swim with any monitor components.  The monitor CAN be worn in the shower.  Remove the patch from your skin after you begin monitoring.  With normal wear, each patch should last 4-5 days.    GETTING STARTED  Wash and dry the area where patch is to be applied. (SHAVE if needed prior to washing and drying)  Once skin is dry, scrub the area with the provided scrub pad for one minute.  Do not apply lotions or oils to the area.  Remove a patch from the MCOT Patch Pouch.  Place sensor into patch and press down firmly to snap it in place.  Locate the patch placement template and follow it's instructions for use.  Remove clear backing and apply patch to your chest using the patch placement template as a guide.  It may be helpful to use a mirror for guidance.  Remove template when finished.  Press patch firmly against your skin then remove top white paper.  Push on the power button on the monitor.  Follow the on screen  guidance to complete the set-up.    CHANGING PATCHES  Power off the monitor.  Remove the patch by pulling the clear adhesive away from your body.  Apply downward pressure on the tab to snap/break it off.  This will require some force.  Hold and slide the sensor forward to remove it from patch.  Discard the used patch, NOT THE SENSOR. The sensor will be re-used on a new patch after sensor is recharged.  Charge the sensor and charge the monitor.  Charging of both devices can take up to 2 hours.  Once sensor is fully charged, apply a new patch to your chest.    WHAT TO EXPECT  Your physician prescribes a [employer] device for you.  The monitor is applied in the office or shipped to your home. *Device will be received within 2 business days after Insurance Verification has been completed.  Start your monitoring service within 24 hours of receipt of the monitor if it is mailed to you. For assistance, follow the steps in the quick start instructions or the Patient Education Guide in the kit, or watch the video tutorial at [email or web address] customer Support team may call you. Please answer a call that may come from an [phone] (toll-free) number or ([phone]) area code.  Trained cardiac technicians from [facility] review data during and at the end of your monitoring period, and in some cases may contact your physician.  Reports and alerts posted to an online portal for your physician to review and follow-up with you.  Return the equipment via pre-paid shipping envelope.  Reports are provided to physician to interpret, diagnosis and follow up with you.    TROUBLESHOOTING  A \"No Communication\" message on your monitor means the sensor is out of range.  To resolve the issue, keep the monitor within a range of 30 feet.  If the issue persists more than 15 minutes, contact Customer Service at [phone].  If you are in an area with no or limited cellular coverage you may receive a message.  To resolve the  issue, move to an area with cellular coverage.  If you are unable to do so, the monitor will store the data and transmit when service becomes available.  If you experience discomfort with the patch anytime during monitoring, a lead wire adapter is available in the kit for alternate use.    How to Return monitor at End of Service  Returning your monitor promptly is important.  Simply pack up the device and components back into the kit.  Place the kit into the enclosed postage-paid bag and follow the instructions to return the device.    There is 1 Wet Read(s) to document. There are no Wet Read(s) to document.

## 2024-06-06 PROBLEM — F20.9 SCHIZOPHRENIA, UNSPECIFIED: Chronic | Status: INACTIVE | Noted: 2019-12-06 | Resolved: 2021-01-22

## 2024-06-07 NOTE — ED ADULT TRIAGE NOTE - CCCP TRG CHIEF CMPLNT
[Consideration of Curative Therapy] : consideration of curative therapy for [Prostate Cancer] : prostate cancer psychiatric evaluation

## 2024-08-31 NOTE — ED ADULT TRIAGE NOTE - NS ED TRIAGE AVPU SCALE
90 Alert-The patient is alert, awake and responds to voice. The patient is oriented to time, place, and person. The triage nurse is able to obtain subjective information.

## 2024-10-08 NOTE — ED ADULT NURSE NOTE - NSFALLRSKOUTCOME_ED_ALL_ED
Pt. Contacted. Procedure and follow up scheduled. Educated on pre-procedure instructions, also mailed. Verbalized understanding.    Universal Safety Interventions

## 2024-11-05 NOTE — BH TREATMENT PLAN - NSTXVIOLNTGOAL_PSY_ALL_CORE
Mother reports fever, URI symptoms, and vomiting.   
Will communicate an angry feeling in a controlled manner
Other...

## 2025-06-30 NOTE — BH INPATIENT PSYCHIATRY PROGRESS NOTE - NSTXPSYCHODATEEST_PSY_ALL_CORE
Please tell Liyah and her daughter Mathew  Kidney function, electrolytes, blood counts, within normal and acceptable range without any significant concerns     
10-Feb-2021
10-Feb-2021
25-Jan-2021
10-Feb-2021
25-Jan-2021
10-Feb-2021
25-Jan-2021
10-Feb-2021
10-Feb-2021
25-Jan-2021
10-Feb-2021
25-Jan-2021
10-Feb-2021
25-Jan-2021
25-Jan-2021
10-Feb-2021
03-Mar-2021
25-Jan-2021
25-Jan-2021
10-Feb-2021